# Patient Record
Sex: MALE | Race: WHITE | NOT HISPANIC OR LATINO | Employment: OTHER | URBAN - METROPOLITAN AREA
[De-identification: names, ages, dates, MRNs, and addresses within clinical notes are randomized per-mention and may not be internally consistent; named-entity substitution may affect disease eponyms.]

---

## 2019-01-17 ENCOUNTER — HOSPITAL ENCOUNTER (OUTPATIENT)
Dept: LAB | Facility: HOSPITAL | Age: 69
Discharge: HOME | End: 2019-01-17
Attending: INTERNAL MEDICINE

## 2019-01-17 LAB
ALBUMIN SERPL-MCNC: 4.2 G/DL (ref 3.5–5)
ALBUMIN/GLOB SERPL: 1.4 {RATIO} (ref 1.4–2.6)
ALP SERPL-CCNC: 64 U/L (ref 56–155)
ALT SERPL-CCNC: 24 U/L (ref 10–40)
ANION GAP SERPL CALC-SCNC: 20 MMOL/L (ref 8–19)
AST SERPL-CCNC: 20 U/L (ref 15–50)
BASOPHILS # BLD AUTO: 0.17 10*3/UL (ref 0–0.2)
BASOPHILS NFR BLD AUTO: 1.13 % (ref 0–3)
BILIRUB SERPL-MCNC: 0.36 MG/DL (ref 0.2–1.3)
BUN SERPL-MCNC: 18 MG/DL (ref 5–25)
BUN/CREAT SERPL: 16 {RATIO} (ref 6–20)
CALCIUM SERPL-MCNC: 10.1 MG/DL (ref 8.7–10.4)
CHLORIDE SERPL-SCNC: 101 MMOL/L (ref 99–111)
CHOLEST SERPL-MCNC: 124 MG/DL (ref 107–200)
CHOLEST/HDLC SERPL: 4 {RATIO} (ref 3–6)
CONV CO2: 25 MMOL/L (ref 22–32)
CONV TOTAL PROTEIN: 7.2 G/DL (ref 6.3–8.2)
CREAT UR-MCNC: 1.13 MG/DL (ref 0.7–1.2)
EOSINOPHIL # BLD AUTO: 0.47 10*3/UL (ref 0–0.7)
EOSINOPHIL # BLD AUTO: 3.08 % (ref 0–7)
ERYTHROCYTE [DISTWIDTH] IN BLOOD BY AUTOMATED COUNT: 12.3 % (ref 11.5–14.5)
EST. AVERAGE GLUCOSE BLD GHB EST-MCNC: 194 MG/DL
GFR SERPLBLD BASED ON 1.73 SQ M-ARVRAT: >60 ML/MIN/{1.73_M2}
GLOBULIN UR ELPH-MCNC: 3 G/DL (ref 2–3.5)
GLUCOSE SERPL-MCNC: 176 MG/DL (ref 70–99)
HBA1C MFR BLD: 15.3 G/DL (ref 14–18)
HBA1C MFR BLD: 8.4 % (ref 3.5–5.7)
HCT VFR BLD AUTO: 44.4 % (ref 42–52)
HDLC SERPL-MCNC: 31 MG/DL (ref 40–60)
LDLC SERPL CALC-MCNC: 14 MG/DL (ref 70–100)
LYMPHOCYTES # BLD AUTO: 3.48 10*3/UL (ref 1–5)
MCH RBC QN AUTO: 31.9 PG (ref 27–31)
MCHC RBC AUTO-ENTMCNC: 34.4 G/DL (ref 33–37)
MCV RBC AUTO: 92.8 FL (ref 80–96)
MONOCYTES # BLD AUTO: 1.34 10*3/UL (ref 0.2–1.2)
MONOCYTES NFR BLD AUTO: 8.87 % (ref 3–10)
NEUTROPHILS # BLD AUTO: 9.69 10*3/UL (ref 2–8)
NEUTROPHILS NFR BLD AUTO: 64 % (ref 30–85)
NRBC BLD AUTO-RTO: 0 % (ref 0–0.01)
OSMOLALITY SERPL CALC.SUM OF ELEC: 298 MOSM/KG (ref 273–304)
PLATELET # BLD AUTO: 242 10*3/UL (ref 130–400)
PMV BLD AUTO: 9.7 FL (ref 7.4–10.4)
POTASSIUM SERPL-SCNC: 5.3 MMOL/L (ref 3.5–5.3)
RBC # BLD AUTO: 4.79 10*6/UL (ref 4.7–6.1)
SODIUM SERPL-SCNC: 141 MMOL/L (ref 135–147)
TRIGL SERPL-MCNC: 393 MG/DL (ref 40–150)
VARIANT LYMPHS NFR BLD MANUAL: 23 % (ref 20–45)
VLDLC SERPL-MCNC: 79 MG/DL (ref 5–37)
WBC # BLD AUTO: 15.2 10*3/UL (ref 4.8–10.8)

## 2019-05-16 ENCOUNTER — HOSPITAL ENCOUNTER (OUTPATIENT)
Dept: LAB | Facility: HOSPITAL | Age: 69
Discharge: HOME | End: 2019-05-16
Attending: INTERNAL MEDICINE

## 2019-05-16 LAB
ALBUMIN SERPL-MCNC: 4.2 G/DL (ref 3.5–5)
ALBUMIN/GLOB SERPL: 1.6 {RATIO} (ref 1.4–2.6)
ALP SERPL-CCNC: 64 U/L (ref 56–155)
ALT SERPL-CCNC: 25 U/L (ref 10–40)
ANION GAP SERPL CALC-SCNC: 17 MMOL/L (ref 8–19)
AST SERPL-CCNC: 20 U/L (ref 15–50)
BASOPHILS # BLD AUTO: 0.02 10*3/UL (ref 0–0.2)
BASOPHILS NFR BLD AUTO: 0.2 % (ref 0–3)
BILIRUB SERPL-MCNC: 0.56 MG/DL (ref 0.2–1.3)
BUN SERPL-MCNC: 14 MG/DL (ref 5–25)
BUN/CREAT SERPL: 13 {RATIO} (ref 6–20)
CALCIUM SERPL-MCNC: 9.1 MG/DL (ref 8.7–10.4)
CHLORIDE SERPL-SCNC: 103 MMOL/L (ref 99–111)
CHOLEST SERPL-MCNC: 107 MG/DL (ref 107–200)
CHOLEST/HDLC SERPL: 3.6 {RATIO} (ref 3–6)
CONV ABS IMM GRAN: 0.03 10*3/UL (ref 0–0.2)
CONV CO2: 26 MMOL/L (ref 22–32)
CONV IMMATURE GRAN: 0.4 % (ref 0–1.8)
CONV TOTAL PROTEIN: 6.9 G/DL (ref 6.3–8.2)
CREAT UR-MCNC: 1.05 MG/DL (ref 0.7–1.2)
DEPRECATED RDW RBC AUTO: 45.2 FL (ref 35.1–43.9)
EOSINOPHIL # BLD AUTO: 0.28 10*3/UL (ref 0–0.7)
EOSINOPHIL # BLD AUTO: 3.5 % (ref 0–7)
ERYTHROCYTE [DISTWIDTH] IN BLOOD BY AUTOMATED COUNT: 13.1 % (ref 11.6–14.4)
EST. AVERAGE GLUCOSE BLD GHB EST-MCNC: 192 MG/DL
GFR SERPLBLD BASED ON 1.73 SQ M-ARVRAT: >60 ML/MIN/{1.73_M2}
GLOBULIN UR ELPH-MCNC: 2.7 G/DL (ref 2–3.5)
GLUCOSE SERPL-MCNC: 179 MG/DL (ref 70–99)
HBA1C MFR BLD: 15.1 G/DL (ref 14–18)
HBA1C MFR BLD: 8.3 % (ref 3.5–5.7)
HCT VFR BLD AUTO: 46.6 % (ref 42–52)
HDLC SERPL-MCNC: 30 MG/DL (ref 40–60)
LDLC SERPL CALC-MCNC: 43 MG/DL (ref 70–100)
LYMPHOCYTES # BLD AUTO: 2.63 10*3/UL (ref 1–5)
MCH RBC QN AUTO: 30.5 PG (ref 27–31)
MCHC RBC AUTO-ENTMCNC: 32.4 G/DL (ref 33–37)
MCV RBC AUTO: 94.1 FL (ref 80–96)
MONOCYTES # BLD AUTO: 0.68 10*3/UL (ref 0.2–1.2)
MONOCYTES NFR BLD AUTO: 8.5 % (ref 3–10)
NEUTROPHILS # BLD AUTO: 4.4 10*3/UL (ref 2–8)
NEUTROPHILS NFR BLD AUTO: 54.7 % (ref 30–85)
NRBC CBCN: 0 % (ref 0–0.7)
OSMOLALITY SERPL CALC.SUM OF ELEC: 297 MOSM/KG (ref 273–304)
PLATELET # BLD AUTO: 240 10*3/UL (ref 130–400)
PMV BLD AUTO: 12.1 FL (ref 9.4–12.4)
POTASSIUM SERPL-SCNC: 4.7 MMOL/L (ref 3.5–5.3)
RBC # BLD AUTO: 4.95 10*6/UL (ref 4.7–6.1)
SODIUM SERPL-SCNC: 141 MMOL/L (ref 135–147)
TRIGL SERPL-MCNC: 169 MG/DL (ref 40–150)
VARIANT LYMPHS NFR BLD MANUAL: 32.7 % (ref 20–45)
VLDLC SERPL-MCNC: 34 MG/DL (ref 5–37)
WBC # BLD AUTO: 8.04 10*3/UL (ref 4.8–10.8)

## 2019-08-22 ENCOUNTER — HOSPITAL ENCOUNTER (OUTPATIENT)
Dept: SURGERY | Facility: HOSPITAL | Age: 69
Setting detail: HOSPITAL OUTPATIENT SURGERY
Discharge: HOME | End: 2019-08-22
Attending: OPHTHALMOLOGY

## 2019-08-22 LAB — GLUCOSE BLD-MCNC: 150 MG/DL (ref 70–99)

## 2019-08-29 ENCOUNTER — HOSPITAL ENCOUNTER (OUTPATIENT)
Dept: SURGERY | Facility: HOSPITAL | Age: 69
Setting detail: HOSPITAL OUTPATIENT SURGERY
Discharge: HOME | End: 2019-08-29
Attending: OPHTHALMOLOGY

## 2019-08-29 LAB — GLUCOSE BLD-MCNC: 167 MG/DL (ref 70–99)

## 2019-09-16 ENCOUNTER — HOSPITAL ENCOUNTER (OUTPATIENT)
Dept: LAB | Facility: HOSPITAL | Age: 69
Discharge: HOME | End: 2019-09-16
Attending: INTERNAL MEDICINE

## 2019-09-16 LAB
ANION GAP SERPL CALC-SCNC: 22 MMOL/L (ref 8–19)
BUN SERPL-MCNC: 28 MG/DL (ref 5–25)
BUN/CREAT SERPL: 16 {RATIO} (ref 6–20)
CALCIUM SERPL-MCNC: 10 MG/DL (ref 8.7–10.4)
CHLORIDE SERPL-SCNC: 100 MMOL/L (ref 99–111)
CONV CO2: 27 MMOL/L (ref 22–32)
CREAT UR-MCNC: 1.72 MG/DL (ref 0.7–1.2)
EST. AVERAGE GLUCOSE BLD GHB EST-MCNC: 180 MG/DL
GFR SERPLBLD BASED ON 1.73 SQ M-ARVRAT: 40 ML/MIN/{1.73_M2}
GLUCOSE SERPL-MCNC: 173 MG/DL (ref 70–99)
HBA1C MFR BLD: 7.9 % (ref 3.5–5.7)
OSMOLALITY SERPL CALC.SUM OF ELEC: 308 MOSM/KG (ref 273–304)
POTASSIUM SERPL-SCNC: 4.9 MMOL/L (ref 3.5–5.3)
SODIUM SERPL-SCNC: 144 MMOL/L (ref 135–147)

## 2019-10-15 ENCOUNTER — HOSPITAL ENCOUNTER (OUTPATIENT)
Dept: LAB | Facility: HOSPITAL | Age: 69
Discharge: HOME | End: 2019-10-15
Attending: INTERNAL MEDICINE

## 2019-10-15 LAB
ANION GAP SERPL CALC-SCNC: 22 MMOL/L (ref 8–19)
APPEARANCE UR: CLEAR
BILIRUB UR QL: NEGATIVE
BUN SERPL-MCNC: 22 MG/DL (ref 5–25)
BUN/CREAT SERPL: 18 {RATIO} (ref 6–20)
CALCIUM SERPL-MCNC: 10 MG/DL (ref 8.7–10.4)
CHLORIDE SERPL-SCNC: 95 MMOL/L (ref 99–111)
COLOR UR: YELLOW
CONV CO2: 24 MMOL/L (ref 22–32)
CONV COLLECTION SOURCE (UA): ABNORMAL
CONV UROBILINOGEN IN URINE BY AUTOMATED TEST STRIP: 0.2 {EHRLICHU}/DL (ref 0.1–1)
CREAT UR-MCNC: 1.21 MG/DL (ref 0.7–1.2)
GFR SERPLBLD BASED ON 1.73 SQ M-ARVRAT: >60 ML/MIN/{1.73_M2}
GLUCOSE SERPL-MCNC: 187 MG/DL (ref 70–99)
GLUCOSE UR QL: >=1000 MG/DL
HGB UR QL STRIP: NEGATIVE
KETONES UR QL STRIP: ABNORMAL MG/DL
LEUKOCYTE ESTERASE UR QL STRIP: NEGATIVE
NITRITE UR QL STRIP: NEGATIVE
OSMOLALITY SERPL CALC.SUM OF ELEC: 290 MOSM/KG (ref 273–304)
PH UR STRIP.AUTO: 5 [PH] (ref 5–8)
POTASSIUM SERPL-SCNC: 4.7 MMOL/L (ref 3.5–5.3)
PROT UR QL: NEGATIVE MG/DL
SODIUM SERPL-SCNC: 136 MMOL/L (ref 135–147)
SP GR UR: 1.03 (ref 1–1.03)

## 2019-10-18 LAB
AMOXICILLIN+CLAV SUSC ISLT: <=2
AMPICILLIN SUSC ISLT: <=2
AMPICILLIN+SULBAC SUSC ISLT: <=2
BACTERIA UR CULT: ABNORMAL
CEFAZOLIN SUSC ISLT: <=4
CEFEPIME SUSC ISLT: <=1
CEFTAZIDIME SUSC ISLT: <=1
CEFTRIAXONE SUSC ISLT: <=1
CEFUROXIME ORAL SUSC ISLT: 4
CEFUROXIME PARENTER SUSC ISLT: 4
CIPROFLOXACIN SUSC ISLT: <=0.25
ERTAPENEM SUSC ISLT: <=0.5
GENTAMICIN SUSC ISLT: <=1
LEVOFLOXACIN SUSC ISLT: <=0.12
NITROFURANTOIN SUSC ISLT: <=16
TETRACYCLINE SUSC ISLT: <=1
TMP SMX SUSC ISLT: <=20
TOBRAMYCIN SUSC ISLT: <=1

## 2019-12-04 ENCOUNTER — HOSPITAL ENCOUNTER (OUTPATIENT)
Dept: LAB | Facility: HOSPITAL | Age: 69
Discharge: HOME | End: 2019-12-04
Attending: INTERNAL MEDICINE

## 2019-12-04 LAB
ANION GAP SERPL CALC-SCNC: 21 MMOL/L (ref 8–19)
BUN SERPL-MCNC: 14 MG/DL (ref 5–25)
BUN/CREAT SERPL: 14 {RATIO} (ref 6–20)
CALCIUM SERPL-MCNC: 9.7 MG/DL (ref 8.7–10.4)
CHLORIDE SERPL-SCNC: 97 MMOL/L (ref 99–111)
CHOLEST SERPL-MCNC: 130 MG/DL (ref 107–200)
CHOLEST/HDLC SERPL: 4.1 {RATIO} (ref 3–6)
CONV CO2: 24 MMOL/L (ref 22–32)
CREAT UR-MCNC: 0.99 MG/DL (ref 0.7–1.2)
EST. AVERAGE GLUCOSE BLD GHB EST-MCNC: 186 MG/DL
GFR SERPLBLD BASED ON 1.73 SQ M-ARVRAT: >60 ML/MIN/{1.73_M2}
GLUCOSE SERPL-MCNC: 201 MG/DL (ref 70–99)
HBA1C MFR BLD: 8.1 % (ref 3.5–5.7)
HDLC SERPL-MCNC: 32 MG/DL (ref 40–60)
LDLC SERPL CALC-MCNC: 37 MG/DL (ref 70–100)
OSMOLALITY SERPL CALC.SUM OF ELEC: 292 MOSM/KG (ref 273–304)
POTASSIUM SERPL-SCNC: 4.4 MMOL/L (ref 3.5–5.3)
SODIUM SERPL-SCNC: 138 MMOL/L (ref 135–147)
TRIGL SERPL-MCNC: 306 MG/DL (ref 40–150)
VLDLC SERPL-MCNC: 61 MG/DL (ref 5–37)

## 2020-05-07 ENCOUNTER — HOSPITAL ENCOUNTER (OUTPATIENT)
Dept: LAB | Facility: HOSPITAL | Age: 70
Discharge: HOME | End: 2020-05-07
Attending: INTERNAL MEDICINE

## 2020-05-07 LAB
ANION GAP SERPL CALC-SCNC: 25 MMOL/L (ref 8–19)
BUN SERPL-MCNC: 21 MG/DL (ref 5–25)
BUN/CREAT SERPL: 18 {RATIO} (ref 6–20)
CALCIUM SERPL-MCNC: 10.1 MG/DL (ref 8.7–10.4)
CHLORIDE SERPL-SCNC: 102 MMOL/L (ref 99–111)
CONV CO2: 20 MMOL/L (ref 22–32)
CONV CREATININE URINE, RANDOM: 70.4 MG/DL (ref 10–300)
CONV MICROALBUM.,U,RANDOM: <12 MG/L (ref 0–20)
CREAT UR-MCNC: 1.2 MG/DL (ref 0.7–1.2)
EST. AVERAGE GLUCOSE BLD GHB EST-MCNC: 192 MG/DL
GFR SERPLBLD BASED ON 1.73 SQ M-ARVRAT: >60 ML/MIN/{1.73_M2}
GLUCOSE SERPL-MCNC: 159 MG/DL (ref 70–99)
HBA1C MFR BLD: 8.3 % (ref 3.5–5.7)
MICROALBUMIN/CREAT UR: 17 MG/G{CRE} (ref 0–25)
OSMOLALITY SERPL CALC.SUM OF ELEC: 300 MOSM/KG (ref 273–304)
POTASSIUM SERPL-SCNC: 4.7 MMOL/L (ref 3.5–5.3)
SODIUM SERPL-SCNC: 142 MMOL/L (ref 135–147)

## 2020-08-24 ENCOUNTER — HOSPITAL ENCOUNTER (OUTPATIENT)
Dept: LAB | Facility: HOSPITAL | Age: 70
Discharge: HOME | End: 2020-08-24
Attending: INTERNAL MEDICINE

## 2020-08-24 LAB
ALBUMIN SERPL-MCNC: 4.3 G/DL (ref 3.5–5)
ALBUMIN/GLOB SERPL: 1.7 {RATIO} (ref 1.4–2.6)
ALP SERPL-CCNC: 64 U/L (ref 56–155)
ALT SERPL-CCNC: 23 U/L (ref 10–40)
ANION GAP SERPL CALC-SCNC: 18 MMOL/L (ref 8–19)
AST SERPL-CCNC: 18 U/L (ref 15–50)
BASOPHILS # BLD AUTO: 0.02 10*3/UL (ref 0–0.2)
BASOPHILS NFR BLD AUTO: 0.2 % (ref 0–3)
BILIRUB SERPL-MCNC: 0.36 MG/DL (ref 0.2–1.3)
BUN SERPL-MCNC: 21 MG/DL (ref 5–25)
BUN/CREAT SERPL: 17 {RATIO} (ref 6–20)
CALCIUM SERPL-MCNC: 10.4 MG/DL (ref 8.7–10.4)
CHLORIDE SERPL-SCNC: 100 MMOL/L (ref 99–111)
CHOLEST SERPL-MCNC: 125 MG/DL (ref 107–200)
CHOLEST/HDLC SERPL: 4 {RATIO} (ref 3–6)
CONV ABS IMM GRAN: 0.03 10*3/UL (ref 0–0.2)
CONV CO2: 25 MMOL/L (ref 22–32)
CONV IMMATURE GRAN: 0.3 % (ref 0–1.8)
CONV TOTAL PROTEIN: 6.8 G/DL (ref 6.3–8.2)
CREAT UR-MCNC: 1.25 MG/DL (ref 0.7–1.2)
DEPRECATED RDW RBC AUTO: 49 FL (ref 35.1–43.9)
EOSINOPHIL # BLD AUTO: 0.64 10*3/UL (ref 0–0.7)
EOSINOPHIL # BLD AUTO: 6.5 % (ref 0–7)
ERYTHROCYTE [DISTWIDTH] IN BLOOD BY AUTOMATED COUNT: 13.8 % (ref 11.6–14.4)
EST. AVERAGE GLUCOSE BLD GHB EST-MCNC: 166 MG/DL
GFR SERPLBLD BASED ON 1.73 SQ M-ARVRAT: 59 ML/MIN/{1.73_M2}
GLOBULIN UR ELPH-MCNC: 2.5 G/DL (ref 2–3.5)
GLUCOSE SERPL-MCNC: 176 MG/DL (ref 70–99)
HBA1C MFR BLD: 7.4 % (ref 3.5–5.7)
HCT VFR BLD AUTO: 47.8 % (ref 42–52)
HDLC SERPL-MCNC: 31 MG/DL (ref 40–60)
HGB BLD-MCNC: 15.5 G/DL (ref 14–18)
LDLC SERPL CALC-MCNC: 27 MG/DL (ref 70–100)
LYMPHOCYTES # BLD AUTO: 3.05 10*3/UL (ref 1–5)
LYMPHOCYTES NFR BLD AUTO: 31.2 % (ref 20–45)
MCH RBC QN AUTO: 31.3 PG (ref 27–31)
MCHC RBC AUTO-ENTMCNC: 32.4 G/DL (ref 33–37)
MCV RBC AUTO: 96.4 FL (ref 80–96)
MONOCYTES # BLD AUTO: 1.1 10*3/UL (ref 0.2–1.2)
MONOCYTES NFR BLD AUTO: 11.2 % (ref 3–10)
NEUTROPHILS # BLD AUTO: 4.95 10*3/UL (ref 2–8)
NEUTROPHILS NFR BLD AUTO: 50.6 % (ref 30–85)
NRBC CBCN: 0 % (ref 0–0.7)
OSMOLALITY SERPL CALC.SUM OF ELEC: 293 MOSM/KG (ref 273–304)
PLATELET # BLD AUTO: 222 10*3/UL (ref 130–400)
PMV BLD AUTO: 12.5 FL (ref 9.4–12.4)
POTASSIUM SERPL-SCNC: 5.3 MMOL/L (ref 3.5–5.3)
RBC # BLD AUTO: 4.96 10*6/UL (ref 4.7–6.1)
SODIUM SERPL-SCNC: 138 MMOL/L (ref 135–147)
T4 FREE SERPL-MCNC: 1.1 NG/DL (ref 0.9–1.8)
TRIGL SERPL-MCNC: 336 MG/DL (ref 40–150)
TSH SERPL-ACNC: 3.94 M[IU]/L (ref 0.27–4.2)
VLDLC SERPL-MCNC: 67 MG/DL (ref 5–37)
WBC # BLD AUTO: 9.79 10*3/UL (ref 4.8–10.8)

## 2021-01-14 ENCOUNTER — HOSPITAL ENCOUNTER (OUTPATIENT)
Dept: LAB | Facility: HOSPITAL | Age: 71
Discharge: HOME | End: 2021-01-14
Attending: INTERNAL MEDICINE

## 2021-01-14 LAB
ALBUMIN SERPL-MCNC: 4.6 G/DL (ref 3.5–5)
ALBUMIN/GLOB SERPL: 1.4 {RATIO} (ref 1.4–2.6)
ALP SERPL-CCNC: 71 U/L (ref 56–155)
ALT SERPL-CCNC: 35 U/L (ref 10–40)
ANION GAP SERPL CALC-SCNC: 20 MMOL/L (ref 8–19)
AST SERPL-CCNC: 38 U/L (ref 15–50)
BILIRUB SERPL-MCNC: 0.59 MG/DL (ref 0.2–1.3)
BUN SERPL-MCNC: 18 MG/DL (ref 5–25)
BUN/CREAT SERPL: 14 {RATIO} (ref 6–20)
CALCIUM SERPL-MCNC: 9.9 MG/DL (ref 8.7–10.4)
CHLORIDE SERPL-SCNC: 97 MMOL/L (ref 99–111)
CHOLEST SERPL-MCNC: 156 MG/DL (ref 107–200)
CHOLEST/HDLC SERPL: 4.1 {RATIO} (ref 3–6)
CONV CO2: 25 MMOL/L (ref 22–32)
CONV TOTAL PROTEIN: 7.8 G/DL (ref 6.3–8.2)
CREAT UR-MCNC: 1.25 MG/DL (ref 0.7–1.2)
EST. AVERAGE GLUCOSE BLD GHB EST-MCNC: 163 MG/DL
GFR SERPLBLD BASED ON 1.73 SQ M-ARVRAT: 59 ML/MIN/{1.73_M2}
GLOBULIN UR ELPH-MCNC: 3.2 G/DL (ref 2–3.5)
GLUCOSE SERPL-MCNC: 161 MG/DL (ref 70–99)
HBA1C MFR BLD: 7.3 % (ref 3.5–5.7)
HDLC SERPL-MCNC: 38 MG/DL (ref 40–60)
LDLC SERPL CALC-MCNC: 60 MG/DL (ref 70–100)
OSMOLALITY SERPL CALC.SUM OF ELEC: 289 MOSM/KG (ref 273–304)
POTASSIUM SERPL-SCNC: 4.6 MMOL/L (ref 3.5–5.3)
SODIUM SERPL-SCNC: 137 MMOL/L (ref 135–147)
TRIGL SERPL-MCNC: 289 MG/DL (ref 40–150)
VLDLC SERPL-MCNC: 58 MG/DL (ref 5–37)

## 2021-03-10 ENCOUNTER — HOSPITAL ENCOUNTER (OUTPATIENT)
Dept: VACCINE CLINIC | Facility: HOSPITAL | Age: 71
Discharge: HOME | End: 2021-03-10
Attending: INTERNAL MEDICINE

## 2021-03-31 ENCOUNTER — HOSPITAL ENCOUNTER (OUTPATIENT)
Dept: VACCINE CLINIC | Facility: HOSPITAL | Age: 71
Discharge: HOME | End: 2021-03-31
Attending: INTERNAL MEDICINE

## 2021-05-28 ENCOUNTER — HOSPITAL ENCOUNTER (OUTPATIENT)
Dept: LAB | Facility: HOSPITAL | Age: 71
Discharge: HOME | End: 2021-05-28
Attending: INTERNAL MEDICINE

## 2021-05-28 LAB
ANION GAP SERPL CALC-SCNC: 19 MMOL/L (ref 8–19)
BUN SERPL-MCNC: 28 MG/DL (ref 5–25)
BUN/CREAT SERPL: 21 {RATIO} (ref 6–20)
CALCIUM SERPL-MCNC: 9.3 MG/DL (ref 8.7–10.4)
CHLORIDE SERPL-SCNC: 98 MMOL/L (ref 99–111)
CONV CO2: 24 MMOL/L (ref 22–32)
CONV CREATININE URINE, RANDOM: 113.7 MG/DL (ref 10–300)
CONV MICROALBUM.,U,RANDOM: <12 MG/L (ref 0–20)
CREAT UR-MCNC: 1.32 MG/DL (ref 0.7–1.2)
EST. AVERAGE GLUCOSE BLD GHB EST-MCNC: 157 MG/DL
GFR SERPLBLD BASED ON 1.73 SQ M-ARVRAT: 55 ML/MIN/{1.73_M2}
GLUCOSE SERPL-MCNC: 133 MG/DL (ref 70–99)
HBA1C MFR BLD: 7.1 % (ref 3.5–5.7)
MICROALBUMIN/CREAT UR: 10.6 MG/G{CRE} (ref 0–25)
OSMOLALITY SERPL CALC.SUM OF ELEC: 289 MOSM/KG (ref 273–304)
POTASSIUM SERPL-SCNC: 5 MMOL/L (ref 3.5–5.3)
SODIUM SERPL-SCNC: 136 MMOL/L (ref 135–147)

## 2021-07-09 RX ORDER — LANCETS 30 GAUGE
1 EACH MISCELLANEOUS 2 TIMES DAILY
Qty: 180 EACH | Refills: 0 | Status: SHIPPED | OUTPATIENT
Start: 2021-07-09 | End: 2022-07-25 | Stop reason: SDUPTHER

## 2021-07-09 RX ORDER — PEN NEEDLE, DIABETIC 30 GX5/16"
1 NEEDLE, DISPOSABLE MISCELLANEOUS 2 TIMES DAILY
Qty: 180 EACH | Refills: 0 | Status: SHIPPED | OUTPATIENT
Start: 2021-07-09 | End: 2022-07-25 | Stop reason: SDUPTHER

## 2021-07-15 ENCOUNTER — TELEPHONE (OUTPATIENT)
Dept: INTERNAL MEDICINE | Facility: CLINIC | Age: 71
End: 2021-07-15

## 2021-07-15 NOTE — TELEPHONE ENCOUNTER
[facility] called and stated the  for Bydureon doesn't make it anymore. They do have a Bydureon BCise. Can the switch it? PharmacistLiya stated the directions would be the same. [phone], Option #3

## 2021-07-16 ENCOUNTER — PREP FOR SURGERY (OUTPATIENT)
Dept: OTHER | Facility: HOSPITAL | Age: 71
End: 2021-07-16

## 2021-07-16 ENCOUNTER — OFFICE VISIT (OUTPATIENT)
Dept: SURGERY | Facility: CLINIC | Age: 71
End: 2021-07-16

## 2021-07-16 VITALS — BODY MASS INDEX: 42.04 KG/M2 | HEIGHT: 66 IN | WEIGHT: 261.6 LBS

## 2021-07-16 DIAGNOSIS — K59.00 CONSTIPATION, UNSPECIFIED CONSTIPATION TYPE: Primary | ICD-10-CM

## 2021-07-16 DIAGNOSIS — Z86.010 HISTORY OF COLONIC POLYPS: ICD-10-CM

## 2021-07-16 DIAGNOSIS — K59.00 CONSTIPATION: Primary | ICD-10-CM

## 2021-07-16 PROBLEM — Z86.0100 HISTORY OF COLONIC POLYPS: Status: ACTIVE | Noted: 2021-07-16

## 2021-07-16 PROCEDURE — 99203 OFFICE O/P NEW LOW 30 MIN: CPT | Performed by: NURSE PRACTITIONER

## 2021-07-16 RX ORDER — EXENATIDE 2 MG/.65ML
INJECTION, SUSPENSION, EXTENDED RELEASE SUBCUTANEOUS
COMMUNITY
End: 2022-03-11 | Stop reason: SDUPTHER

## 2021-07-16 RX ORDER — PANTOPRAZOLE SODIUM 40 MG/10ML
INJECTION, POWDER, LYOPHILIZED, FOR SOLUTION INTRAVENOUS EVERY 24 HOURS
COMMUNITY
Start: 2021-06-02 | End: 2021-08-12 | Stop reason: ALTCHOICE

## 2021-07-16 RX ORDER — LORATADINE 10 MG/1
10 CAPSULE, LIQUID FILLED ORAL
COMMUNITY
End: 2021-10-18 | Stop reason: SDUPTHER

## 2021-07-16 RX ORDER — SODIUM CHLORIDE 0.9 % (FLUSH) 0.9 %
3 SYRINGE (ML) INJECTION EVERY 12 HOURS SCHEDULED
Status: CANCELLED | OUTPATIENT
Start: 2021-07-16

## 2021-07-16 RX ORDER — MULTIPLE VITAMINS W/ MINERALS TAB 9MG-400MCG
1 TAB ORAL DAILY
COMMUNITY
End: 2022-07-25 | Stop reason: SDUPTHER

## 2021-07-16 RX ORDER — SODIUM CHLORIDE 0.9 % (FLUSH) 0.9 %
10 SYRINGE (ML) INJECTION AS NEEDED
Status: CANCELLED | OUTPATIENT
Start: 2021-07-16

## 2021-07-16 RX ORDER — ASPIRIN 81 MG/1
81 TABLET ORAL DAILY
COMMUNITY
End: 2022-02-28 | Stop reason: SDUPTHER

## 2021-07-16 RX ORDER — DOCUSATE SODIUM 100 MG/1
100 CAPSULE, LIQUID FILLED ORAL 3 TIMES DAILY
COMMUNITY
End: 2022-02-28 | Stop reason: SDUPTHER

## 2021-07-16 RX ORDER — ATENOLOL 50 MG/1
50 TABLET ORAL DAILY
COMMUNITY
End: 2022-01-20 | Stop reason: SDUPTHER

## 2021-07-16 RX ORDER — ATORVASTATIN CALCIUM 40 MG/1
40 TABLET, FILM COATED ORAL DAILY
COMMUNITY
End: 2022-02-28 | Stop reason: SDUPTHER

## 2021-07-16 RX ORDER — POLYETHYLENE GLYCOL 3350 17 G/17G
POWDER, FOR SOLUTION ORAL EVERY 24 HOURS
Status: ON HOLD | COMMUNITY
Start: 2021-06-02 | End: 2022-01-12

## 2021-07-16 RX ORDER — INSULIN GLARGINE 100 [IU]/ML
10 INJECTION, SOLUTION SUBCUTANEOUS
COMMUNITY
End: 2022-04-25 | Stop reason: SDUPTHER

## 2021-07-16 RX ORDER — GABAPENTIN 600 MG/1
600 TABLET ORAL 3 TIMES DAILY
COMMUNITY
End: 2022-02-28 | Stop reason: SDUPTHER

## 2021-07-16 RX ORDER — LISINOPRIL AND HYDROCHLOROTHIAZIDE 20; 12.5 MG/1; MG/1
1 TABLET ORAL DAILY
COMMUNITY
End: 2022-01-20 | Stop reason: SDUPTHER

## 2021-07-16 NOTE — PROGRESS NOTES
"Chief Complaint  Colonoscopy    Subjective          Norberto Whiteside presents to [facility] GENERAL SURGERY  Patient is a 71-year-old white/ male that presents to general surgery office for colonoscopy consultation.    Patient admits to some constipation issues.  Reports that he only has a bowel movement every 3 days.    Admits to recently starting stool softeners 3 times a day and MiraLAX daily.  Reports it has improved his symptoms some.    Denies any change in bowel habit or rectal bleeding.    Admits to history of colorectal cancer with his paternal uncle.    Reports last colonoscopy was 10 years ago at [place] and had a few small polyps removed.    Patient has no known drug allergies.    Objective   Vital Signs:   Ht 167.6 cm (66\")   Wt 119 kg (261 lb 9.6 oz)   BMI 42.22 kg/m²     Physical Exam  Constitutional:       Appearance: Normal appearance.   Cardiovascular:      Rate and Rhythm: Normal rate.   Pulmonary:      Effort: Pulmonary effort is normal.   Abdominal:      General: Abdomen is flat.      Palpations: Abdomen is soft.   Skin:     General: Skin is warm and dry.   Neurological:      General: No focal deficit present.      Mental Status: He is alert and oriented to person, place, and time.   Psychiatric:         Mood and Affect: Mood normal.         Behavior: Behavior normal.        Result Review :                 Assessment and Plan    Diagnoses and all orders for this visit:    1. Constipation, unspecified constipation type (Primary)    2. History of colonic polyps        Follow Up   Return for Scheduled colonoscopy with Dr. Meade on 8/18/21 at [facility].     Hospital call with arrival time.      Patient was given instructions and counseling regarding his condition or for health maintenance advice. Please see specific information pulled into the AVS if appropriate.       "

## 2021-08-12 ENCOUNTER — OFFICE VISIT (OUTPATIENT)
Dept: PODIATRY | Facility: CLINIC | Age: 71
End: 2021-08-12

## 2021-08-12 VITALS
WEIGHT: 208 LBS | OXYGEN SATURATION: 98 % | SYSTOLIC BLOOD PRESSURE: 109 MMHG | BODY MASS INDEX: 33.43 KG/M2 | HEIGHT: 66 IN | DIASTOLIC BLOOD PRESSURE: 59 MMHG | HEART RATE: 82 BPM | TEMPERATURE: 97.1 F

## 2021-08-12 DIAGNOSIS — E11.8 DIABETIC FOOT (HCC): Primary | ICD-10-CM

## 2021-08-12 DIAGNOSIS — B35.1 ONYCHOMYCOSIS: ICD-10-CM

## 2021-08-12 DIAGNOSIS — M79.671 FOOT PAIN, BILATERAL: ICD-10-CM

## 2021-08-12 DIAGNOSIS — M79.672 FOOT PAIN, BILATERAL: ICD-10-CM

## 2021-08-12 DIAGNOSIS — E11.42 TYPE 2 DIABETES MELLITUS WITH DIABETIC POLYNEUROPATHY, WITH LONG-TERM CURRENT USE OF INSULIN (HCC): ICD-10-CM

## 2021-08-12 DIAGNOSIS — L60.0 ONYCHOCRYPTOSIS: ICD-10-CM

## 2021-08-12 DIAGNOSIS — Z79.4 TYPE 2 DIABETES MELLITUS WITH DIABETIC POLYNEUROPATHY, WITH LONG-TERM CURRENT USE OF INSULIN (HCC): ICD-10-CM

## 2021-08-12 DIAGNOSIS — G62.9 NEUROPATHY: ICD-10-CM

## 2021-08-12 PROCEDURE — 99203 OFFICE O/P NEW LOW 30 MIN: CPT | Performed by: PODIATRIST

## 2021-08-12 PROCEDURE — 11721 DEBRIDE NAIL 6 OR MORE: CPT | Performed by: PODIATRIST

## 2021-08-12 RX ORDER — PANTOPRAZOLE SODIUM 40 MG/1
40 TABLET, DELAYED RELEASE ORAL
COMMUNITY
Start: 2021-06-08 | End: 2022-03-11 | Stop reason: SDUPTHER

## 2021-08-12 NOTE — PROGRESS NOTES
[facility]    Today's Date: 21    Patient Name: Norberto Whiteside  MRN: [id number]  CSN: [id number]  PCP: Moreno Pereira MD  Referring Provider: No ref. provider found    SUBJECTIVE     Chief Complaint   Patient presents with   • Left Foot - Diabetes, Annual Exam   • Right Foot - Diabetes, Annual Exam     HPI: Norberto Whiteside, a 71 y.o.male, comes to clinic.    New, Established, New Problem:  New    Location:  Toenails    Duration:   Greater than five years    Onset:  Gradual    Nature:  sore with palpation.    Stable, worsening, improving:   Worsening    Aggravating factors:  Pain with shoe gear and ambulation.    Previous Treatment:  Unable to trim his own toenails  __________________________________    New, Established, New Problem:  New problem    Location:  bilateral feet    Duration:    Onset:  gradual    Nature:   IDDM     Stable, worsening, improving:  stable    Patient reported last blood glucose: 143  __________________________________    No other pedal complaints at this time.    Patient denies any fevers, chills, nausea, vomiting, shortness of breathe, nor any other constitutional signs nor symptoms.       Last PCP Visit:  Moreno Pereira MD, 15 July 2021    Past Medical History:   Diagnosis Date   • Allergies    • Callus    • Cancer (CMS/HCC)     skin cancer   • Diabetes mellitus (CMS/HCC)    • Hard of hearing    • Hypertension    • Sciatica      Past Surgical History:   Procedure Laterality Date   • ANGIOPLASTY       Family History   Problem Relation Age of Onset   • Stroke Mother    • Cancer Mother    • Cancer Father    • Cancer Sister      Social History     Socioeconomic History   • Marital status:      Spouse name: Not on file   • Number of children: Not on file   • Years of education: Not on file   • Highest education level: Not on file   Tobacco Use   • Smoking status: Former Smoker     Types: Cigarettes     Quit date: 3/1/1996     Years since quittin.4  "  • Smokeless tobacco: Never Used   Vaping Use   • Vaping Use: Never used   Substance and Sexual Activity   • Alcohol use: Yes     Comment: rare   • Drug use: Never   • Sexual activity: Defer     No Known Allergies  Current Outpatient Medications   Medication Sig Dispense Refill   • aspirin 81 MG EC tablet Take 81 mg by mouth Daily.     • atenolol (TENORMIN) 50 MG tablet Take 50 mg by mouth Daily.     • atorvastatin (LIPITOR) 40 MG tablet Take 40 mg by mouth Daily.     • docusate sodium (COLACE) 100 MG capsule Take 100 mg by mouth 3 (Three) Times a Day.     • empagliflozin (Jardiance) 25 MG tablet tablet Take 25 mg by mouth Daily.     • exenatide er (Bydureon) 2 MG pen-injector injection 1 injection     • gabapentin (NEURONTIN) 600 MG tablet Take 600 mg by mouth 3 (Three) Times a Day.     • insulin glargine (LANTUS, SEMGLEE) 100 UNIT/ML injection Inject 10 Units under the skin into the appropriate area as directed Daily.     • Insulin Pen Needle (Pen Needles 3/16\") 31G X 5 MM misc 1 kit 2 (two) times a day. 180 each 0   • Lancets misc 1 kit 2 (two) times a day. 180 each 0   • lisinopril-hydrochlorothiazide (PRINZIDE,ZESTORETIC) 20-12.5 MG per tablet qd     • Loratadine 10 MG capsule Take 10 mg by mouth.     • metFORMIN (GLUCOPHAGE) 1000 MG tablet Take 1,000 mg by mouth 2 (Two) Times a Day With Meals.     • multivitamin with minerals (MULTIVITAMIN ADULTS PO) Take 1 tablet by mouth Daily.     • pantoprazole (PROTONIX) 40 MG EC tablet Daily.     • polyethylene glycol (MiraLax) 17 GM/SCOOP powder Daily.       No current facility-administered medications for this visit.     Review of Systems   Constitutional: Negative.    Skin:        Painful toenails   Neurological: Positive for numbness.   All other systems reviewed and are negative.      OBJECTIVE     Vitals:    08/12/21 1327   BP: 109/59   Pulse: 82   Temp: 97.1 °F (36.2 °C)   SpO2: 98%       Lab Results   Component Value Date    HGBA1C 7.1 (H) 05/28/2021       Lab " Results   Component Value Date    CALCIUM 9.3 05/28/2021     05/28/2021    K 5.0 05/28/2021    CO2 24 05/28/2021    CL 98 (L) 05/28/2021    BUN 28 (H) 05/28/2021    CREATININE 1.32 (H) 05/28/2021    BCR 21 (H) 05/28/2021    ANIONGAP 19 05/28/2021       Patient seen in no apparent distress.      PHYSICAL EXAM:     Foot/Ankle Exam:       General:   Diabetic Foot Exam Performed    Appearance: appears stated age and healthy    Orientation: AAOx3    Affect: appropriate    Gait: unimpaired    Shoe Gear:  Casual shoes    VASCULAR      Right Foot Vascularity   Normal vascular exam    Dorsalis pedis:  1+  Posterior tibial:  1+  Skin Temperature: cool    Edema Grading:  None  CFT:  < 3 seconds  Pedal Hair Growth:  Present  Varicosities: none       Left Foot Vascularity   Normal vascular exam    Dorsalis pedis:  1+  Posterior tibial:  1+  Skin Temperature: cool    Edema Grading:  None  CFT:  < 3 seconds  Pedal Hair Growth:  Present  Varicosities: none        NEUROLOGIC     Right Foot Neurologic   Light touch sensation:  Diminished  Vibratory sensation:  Diminished  Hot/Cold sensation: diminished    Protective Sensation using [place]-Keiko Monofilament:  2     Left Foot Neurologic   Light touch sensation:  Diminished  Vibratory sensation:  Diminished  Hot/cold sensation: diminished    Protective Sensation using Wood[place]-Keiko Monofilament:  2     MUSCLE STRENGTH     Right Foot Muscle Strength   Normal strength    Foot dorsiflexion:  5  Foot plantar flexion:  5  Foot inversion:  5  Foot eversion:  5     Left Foot Muscle Strength   Normal strength    Foot dorsiflexion:  5  Foot plantar flexion:  5  Foot inversion:  5  Foot eversion:  5     RANGE OF MOTION      Right Foot Range of Motion   Foot and ankle ROM within normal limits       Left Foot Range of Motion   Foot and ankle ROM within normal limits       DERMATOLOGIC     Right Foot Dermatologic   Skin: skin intact    Nails: onychomycosis, abnormally thick, subungual  debris and dystrophic nails    Nails comment:  Toenails 1, 2, 3, 4, and 5     Left Foot Dermatologic   Skin: skin intact    Nails: onychomycosis, abnormally thick, subungual debris, dystrophic nails and ingrown toenail    Nails comment:  Toenails 1, 2, 3, 4, and 5      Diabetic Foot Exam Performed    ASSESSMENT/PLAN     Diagnoses and all orders for this visit:    1. Diabetic foot (CMS/[facility]) (Primary)    2. Type 2 diabetes mellitus with diabetic polyneuropathy, with long-term current use of insulin (CMS/[facility])    3. Foot pain, bilateral    4. Onychomycosis    5. Onychocryptosis    6. Neuropathy        Comprehensive lower extremity examination and evaluation was performed.    Discussed findings and treatment plan including risks, benefits, and treatment options with patient in detail. Patient agreed with treatment plan.    Toenails 1 through 5 bilaterally were debrided in thickness and length and then smoothed with a Dremel Tool.  Tolerated the procedure well without complications.    Diabetic foot exam performed and documented this date, compliant with CQM required standards. Detail of findings as noted in physical exam.  Lower extremity Neurologic exam for diabetic patient performed and documented this date, compliant with PQRS required standards. Detail of findings as noted in physical exam.  Advised patient importance of good routine lower extremity hygiene. Advised patient importance of evaluating for intact skin and pain free nail borders.  Advised patient to use mirror to evaluate plantar/ soles of feet for better visualization. Advised patient monitor and phone office to be seen if any cracking to skin, open lesions, painful nail borders or if nails become elongated prior to next visit. Advised patient importance of daily cleansing of lower extremities, followed by good skin cream to maintain normal hydration of skin. Also advised patient importance of close daily monitoring of blood sugar. Advised to regulate  diet and medications to maintain control of blood sugar in optimal range. Contact primary care provider if difficulties maintaining blood sugar levels.  Advised Patient of presence of Diabetes Mellitus condition.  Advised Patient risk of progression and worsening or improvement, then return of condition.  Will monitor condition for any change in future. Treat with most appropriate treatment pending status of condition.  Counseled and advised patient extensively on nature and ramifications of diabetes. Standard instructions given to patient for good diabetic foot care and maintenance. Advised importance of careful monitoring to avoid break down and complications secondary to diabetes. Advised patient importance of strict maintenance of blood sugar control. Advised patient of possible ominous results from neglect of condition, i.e.: amputation/ loss of digits, feet and legs, or even death.  Patient states understands counseling, will monitor closely, continue good hygiene and routine diabetic foot care. Patient will contact office is questions or problems.      An After Visit Summary was printed and given to the patient at discharge, including (if requested) any available informative/educational handouts regarding diagnosis, treatment, or medications. All questions were answered to patient/family satisfaction. Should symptoms fail to improve or worsen they agree to call or return to clinic or to go to the Emergency Department. Discussed the importance of following up with any needed screening tests/labs/specialist appointments and any requested follow-up recommended by me today. Importance of maintaining follow-up discussed and patient accepts that missed appointments can delay diagnosis and potentially lead to worsening of conditions.    Return in about 9 weeks (around 10/14/2021) for Toenail Care., or sooner if acute issues arise.    This document has been electronically signed by Wayne Foster DPM on August  12, 2021 13:54 EDT

## 2021-08-16 ENCOUNTER — TELEPHONE (OUTPATIENT)
Dept: SURGERY | Facility: CLINIC | Age: 71
End: 2021-08-16

## 2021-09-16 RX ORDER — TRAMADOL HYDROCHLORIDE 50 MG/1
TABLET ORAL
Qty: 120 TABLET | Refills: 2 | Status: SHIPPED | OUTPATIENT
Start: 2021-09-16 | End: 2022-07-25 | Stop reason: SDUPTHER

## 2021-10-03 PROBLEM — M15.0 PRIMARY OSTEOARTHRITIS INVOLVING MULTIPLE JOINTS: Status: ACTIVE | Noted: 2021-10-03

## 2021-10-03 PROBLEM — M19.90 IDIOPATHIC OSTEOARTHRITIS: Status: ACTIVE | Noted: 2021-10-03

## 2021-10-03 PROBLEM — E66.812 CLASS 2 SEVERE OBESITY DUE TO EXCESS CALORIES WITH SERIOUS COMORBIDITY AND BODY MASS INDEX (BMI) OF 38.0 TO 38.9 IN ADULT: Status: ACTIVE | Noted: 2021-10-03

## 2021-10-03 PROBLEM — E11.65 HYPERGLYCEMIA DUE TO TYPE 2 DIABETES MELLITUS: Status: ACTIVE | Noted: 2021-10-03

## 2021-10-03 PROBLEM — G93.32 CHRONIC FATIGUE SYNDROME: Status: ACTIVE | Noted: 2021-10-03

## 2021-10-03 PROBLEM — M15.9 PRIMARY OSTEOARTHRITIS INVOLVING MULTIPLE JOINTS: Status: ACTIVE | Noted: 2021-10-03

## 2021-10-03 PROBLEM — Z79.4 TYPE 2 DIABETES MELLITUS WITH HYPERGLYCEMIA, WITH LONG-TERM CURRENT USE OF INSULIN: Status: ACTIVE | Noted: 2021-10-03

## 2021-10-03 PROBLEM — E66.01 CLASS 2 SEVERE OBESITY DUE TO EXCESS CALORIES WITH SERIOUS COMORBIDITY AND BODY MASS INDEX (BMI) OF 38.0 TO 38.9 IN ADULT (HCC): Status: ACTIVE | Noted: 2021-10-03

## 2021-10-03 PROBLEM — E78.2 MIXED HYPERLIPIDEMIA: Status: ACTIVE | Noted: 2021-10-03

## 2021-10-03 PROBLEM — I25.110 UNSTABLE ANGINA PECTORIS DUE TO CORONARY ARTERIOSCLEROSIS: Status: ACTIVE | Noted: 2021-10-03

## 2021-10-03 PROBLEM — E11.65 TYPE 2 DIABETES MELLITUS WITH HYPERGLYCEMIA, WITH LONG-TERM CURRENT USE OF INSULIN: Status: ACTIVE | Noted: 2021-10-03

## 2021-10-03 PROBLEM — I10 PRIMARY HYPERTENSION: Status: ACTIVE | Noted: 2021-10-03

## 2021-10-13 ENCOUNTER — LAB (OUTPATIENT)
Dept: LAB | Facility: HOSPITAL | Age: 71
End: 2021-10-13

## 2021-10-13 ENCOUNTER — TRANSCRIBE ORDERS (OUTPATIENT)
Dept: LAB | Facility: HOSPITAL | Age: 71
End: 2021-10-13

## 2021-10-13 DIAGNOSIS — E78.5 HYPERLIPIDEMIA, UNSPECIFIED HYPERLIPIDEMIA TYPE: ICD-10-CM

## 2021-10-13 DIAGNOSIS — E11.00 TYPE II DIABETES MELLITUS WITH HYPEROSMOLARITY, UNCONTROLLED (HCC): ICD-10-CM

## 2021-10-13 DIAGNOSIS — E11.65 TYPE II DIABETES MELLITUS WITH HYPEROSMOLARITY, UNCONTROLLED (HCC): ICD-10-CM

## 2021-10-13 DIAGNOSIS — E11.65 TYPE II DIABETES MELLITUS WITH HYPEROSMOLARITY, UNCONTROLLED (HCC): Primary | ICD-10-CM

## 2021-10-13 DIAGNOSIS — E11.00 TYPE II DIABETES MELLITUS WITH HYPEROSMOLARITY, UNCONTROLLED (HCC): Primary | ICD-10-CM

## 2021-10-13 LAB
ALBUMIN SERPL-MCNC: 4.7 G/DL (ref 3.5–5.2)
ALBUMIN/GLOB SERPL: 1.9 G/DL
ALP SERPL-CCNC: 62 U/L (ref 39–117)
ALT SERPL W P-5'-P-CCNC: 34 U/L (ref 1–41)
ANION GAP SERPL CALCULATED.3IONS-SCNC: 10.6 MMOL/L (ref 5–15)
AST SERPL-CCNC: 28 U/L (ref 1–40)
BILIRUB SERPL-MCNC: 0.5 MG/DL (ref 0–1.2)
BUN SERPL-MCNC: 20 MG/DL (ref 8–23)
BUN/CREAT SERPL: 20.2 (ref 7–25)
CALCIUM SPEC-SCNC: 9.5 MG/DL (ref 8.6–10.5)
CHLORIDE SERPL-SCNC: 100 MMOL/L (ref 98–107)
CHOLEST SERPL-MCNC: 165 MG/DL (ref 0–200)
CO2 SERPL-SCNC: 26.4 MMOL/L (ref 22–29)
CREAT SERPL-MCNC: 0.99 MG/DL (ref 0.76–1.27)
GFR SERPL CREATININE-BSD FRML MDRD: 75 ML/MIN/1.73
GLOBULIN UR ELPH-MCNC: 2.5 GM/DL
GLUCOSE SERPL-MCNC: 111 MG/DL (ref 65–99)
HBA1C MFR BLD: 7.12 % (ref 4.8–5.6)
HDLC SERPL-MCNC: 32 MG/DL (ref 40–60)
LDLC SERPL CALC-MCNC: 82 MG/DL (ref 0–100)
LDLC/HDLC SERPL: 2.21 {RATIO}
POTASSIUM SERPL-SCNC: 4.7 MMOL/L (ref 3.5–5.2)
PROT SERPL-MCNC: 7.2 G/DL (ref 6–8.5)
SODIUM SERPL-SCNC: 137 MMOL/L (ref 136–145)
TRIGL SERPL-MCNC: 312 MG/DL (ref 0–150)
VLDLC SERPL-MCNC: 51 MG/DL (ref 5–40)

## 2021-10-13 PROCEDURE — 80053 COMPREHEN METABOLIC PANEL: CPT

## 2021-10-13 PROCEDURE — 83036 HEMOGLOBIN GLYCOSYLATED A1C: CPT

## 2021-10-13 PROCEDURE — 80061 LIPID PANEL: CPT

## 2021-10-13 PROCEDURE — 36415 COLL VENOUS BLD VENIPUNCTURE: CPT

## 2021-10-16 NOTE — PROGRESS NOTES
"Chief Complaint  Follow-up    Subjective          Norberto Whiteside presents to [facility] INTERNAL MEDICINE     History of Present Illness    Patient 71-year-old male with underlying diabetes mellitus, hypertension, hyperlipidemia, resultant coronary artery disease with prior stent, who is coming in now for routine 4-month follow-up.  We will review his labs and make further recommendations at that time.    Review of Systems   Constitutional: Negative for appetite change, fatigue and fever.   HENT: Negative for congestion and ear pain.    Eyes: Negative for blurred vision.   Respiratory: Negative for cough, chest tightness, shortness of breath and wheezing.    Cardiovascular: Negative for chest pain, palpitations and leg swelling.   Gastrointestinal: Negative for abdominal pain.   Genitourinary: Negative for difficulty urinating, dysuria and hematuria.   Musculoskeletal: Negative for arthralgias and gait problem.   Skin: Negative for skin lesions.   Neurological: Negative for syncope, memory problem and confusion.   Psychiatric/Behavioral: Negative for self-injury and depressed mood.       Objective   Vital Signs:   /76 (BP Location: Left arm, Patient Position: Sitting, Cuff Size: Adult)   Pulse 82   Temp 97.2 °F (36.2 °C) (Tympanic)   Resp 18   Ht 167.6 cm (66\")   Wt 97.3 kg (214 lb 9.6 oz)   SpO2 97% Comment: room air  BMI 34.64 kg/m²           Physical Exam  Vitals and nursing note reviewed.   Constitutional:       General: He is not in acute distress.     Appearance: Normal appearance. He is not toxic-appearing.   HENT:      Head: Atraumatic.      Right Ear: External ear normal.      Left Ear: External ear normal.      Nose: Nose normal.      Mouth/Throat:      Mouth: Mucous membranes are moist.   Eyes:      General:         Right eye: No discharge.         Left eye: No discharge.      Extraocular Movements: Extraocular movements intact.      Pupils: Pupils are equal, round, and " reactive to light.   Cardiovascular:      Rate and Rhythm: Normal rate and regular rhythm.      Pulses: Normal pulses.      Heart sounds: Normal heart sounds. No murmur heard.  No gallop.    Pulmonary:      Effort: Pulmonary effort is normal. No respiratory distress.      Breath sounds: No wheezing, rhonchi or rales.   Abdominal:      General: There is no distension.      Palpations: Abdomen is soft. There is no mass.      Tenderness: There is no abdominal tenderness. There is no guarding.   Musculoskeletal:         General: No swelling or tenderness.      Cervical back: No tenderness.      Right lower leg: No edema.      Left lower leg: No edema.   Skin:     General: Skin is warm and dry.      Findings: No rash.   Neurological:      General: No focal deficit present.      Mental Status: He is alert and oriented to person, place, and time. Mental status is at baseline.      Motor: No weakness.      Gait: Gait normal.   Psychiatric:         Mood and Affect: Mood normal.         Thought Content: Thought content normal.          Result Review :   The following data was reviewed by: Moreno Pereira MD on 10/05/2021:                  Assessment and Plan    Diagnoses and all orders for this visit:    1. Mixed hyperlipidemia (Primary)  Overview:  LDL is up from 60 to 82 as of 10/21 office visit.  He previously was very low, LDL less than 30 when he was on 80 mg of Lipitor.  He was stable on 40 mg, will continue that for now, and he will address it with lifestyle changes for now.    Orders:  -     Lipid Panel; Future    2. Unstable angina pectoris due to coronary arteriosclerosis (HCC)  Overview:  CAD s/p PTCA '96 with need for SPECT soonish=been too long it sound like; no sxs at least---> needs eval as of 6/21 for dizzy/weak/feels off; this got delayed due to his wife's illness, but is pending as of his 10/21 office visit.  No new symptoms this regards at least.      3. Type 2 diabetes mellitus with hyperglycemia, with  long-term current use of insulin (HCC)  Overview:  A1c remains stable at 7.1 as of 10/21 office visit.  The patient has been maxed out on Jardiance, Bydureon, and Metformin for quite some time.  Despite this his A1c was stuck at about 8.5.  We added low-dose Lantus in 5/20, and he has had excellent control since then.  Continue all same meds as written.    Orders:  -     Hemoglobin A1c; Future    4. Primary osteoarthritis involving multiple joints  Overview:  Had increase symptoms and is back and shoulders from happened care for his wife the last several months.  He treated this with Tylenol and some older saw relaxers he had.      5. Primary hypertension  Overview:  Blood pressure with good control as of 10/21 office visit.  Patient is stable to continue treatment with moderate dose Prinzide as well as moderate dose atenolol.  Of note, he did have low blood pressure when he was in the office last time, and we mentioned perhaps he only needs a half a dose of the lisinopril/HCTZ.  He will monitor his blood pressures again at home due to some dizziness, and perhaps will need to do this.    Orders:  -     Comprehensive Metabolic Panel; Future    6. History of colonic polyps  Overview:  Colonoscopy is pending as of his 10/21 office visit.  This is to be per Dr. Meade, but it has been delayed due to the coronavirus.      7. Other fatigue  -     TSH; Future  -     T4, free; Future    Other orders  -     Fluzone High-Dose 65+yrs ([id number])       --  --  OLDER NOTES:  VISIT 6/21:  ANNUAL MEDICARE WELLNESS PHYSICAL 9/17 = reviewed all forms with pt in office; no new concerns raised.  DM = A1C as below and OPTHO=20/20 and saw them in spring '18.  --  DM 2 ('15) and was started on Tanzem 3 mo ago and low dose amaryl was stopped; needs repeat labs, but FBS still in 280 ballpark; last A1C=9.9; will increase Tanzem before add another agent...down 10.8 to 8.5 past 4 months, so no invokana yet...7.6 is great trend, so no changes  1/18...8.5 again so needs jardiance/etc now since this is despite wt down some at 5/18 OV; also, may lose tanzem he tells me...8.2 is w/o any new agents, wt is still trending down, so will wait until after new year to add another if still in the 8's...8.4 and he will find out which one is covered...8.3 and got on Jardiance, so need to max it out as of 5/19 OV...7.9 is ok for now at least...8.1 and he blames it on his diet around holiday time; maxed out on 3 meds; will use lantus if same on RTO...8.3 and I d/w he needs Lantus now=10U qd and titrate...7.4 is nice drop already 9/20---> 7.1 is better 6/21.  (Micro-alb neg 5/20)  --  CAD s/p PTCA '96 with need for SPECT soonish=been too long it sound like; no sxs at least---> needs eval as of 6/21 for dizzy/weak/feels off;   LIPIDS with LDL 14 and TG's 400, ? lab error; will repeat prior to changes...LDL 43 with TG's < 200 is fine...LDL 37/TG's 300 baseline 12/19...LDL 27, so will lower dose now to 40 mg---> 60 is better 1/21  --  HTN remains well controlled and ok to lower to 1/2 tab ACEI/HCT if stays low at home.  ?CKD3 = 52% and will get on RTO in '19... >60%... 40% out of the blue 9/19---> 55% holding 6/21.  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  NEUROPATHY is worse 5/20 and I d/w anodyne is an option; worse when active; will use PRN ultram.  OBESITY with BMI 37 ballpark (TSH neg 5/18).  --  --  [facility].  [facility]per [place] prior he says, but no longer with them, so I will get copy of last as of 1/21.  Pneumovax x2 ; Prevnar rec 9/17.  Covid vaccine x2 with Pfizer, last was 3/31/21, so I discussed with patient he is fine to get the booster anytime he is aware.  Flu shot for 2021 season given 10/21 office visit.  ( 10/21 after 32 years of marriage, Sun had severe dementia at the end; retired  and then  and  retired '14, 1 girl in town)    Follow Up   No follow-ups on file.  Patient was given instructions and counseling regarding his condition or for health maintenance advice. Please see specific information pulled into the AVS if appropriate.

## 2021-10-18 ENCOUNTER — OFFICE VISIT (OUTPATIENT)
Dept: INTERNAL MEDICINE | Facility: CLINIC | Age: 71
End: 2021-10-18

## 2021-10-18 VITALS
OXYGEN SATURATION: 97 % | HEIGHT: 66 IN | BODY MASS INDEX: 34.49 KG/M2 | DIASTOLIC BLOOD PRESSURE: 76 MMHG | WEIGHT: 214.6 LBS | SYSTOLIC BLOOD PRESSURE: 126 MMHG | HEART RATE: 82 BPM | RESPIRATION RATE: 18 BRPM | TEMPERATURE: 97.2 F

## 2021-10-18 DIAGNOSIS — Z79.4 TYPE 2 DIABETES MELLITUS WITH HYPERGLYCEMIA, WITH LONG-TERM CURRENT USE OF INSULIN (HCC): ICD-10-CM

## 2021-10-18 DIAGNOSIS — I25.110 UNSTABLE ANGINA PECTORIS DUE TO CORONARY ARTERIOSCLEROSIS (HCC): ICD-10-CM

## 2021-10-18 DIAGNOSIS — Z86.010 HISTORY OF COLONIC POLYPS: ICD-10-CM

## 2021-10-18 DIAGNOSIS — M15.9 PRIMARY OSTEOARTHRITIS INVOLVING MULTIPLE JOINTS: ICD-10-CM

## 2021-10-18 DIAGNOSIS — R53.83 OTHER FATIGUE: ICD-10-CM

## 2021-10-18 DIAGNOSIS — E11.65 TYPE 2 DIABETES MELLITUS WITH HYPERGLYCEMIA, WITH LONG-TERM CURRENT USE OF INSULIN (HCC): ICD-10-CM

## 2021-10-18 DIAGNOSIS — E78.2 MIXED HYPERLIPIDEMIA: Primary | ICD-10-CM

## 2021-10-18 DIAGNOSIS — I10 PRIMARY HYPERTENSION: ICD-10-CM

## 2021-10-18 PROCEDURE — 99214 OFFICE O/P EST MOD 30 MIN: CPT | Performed by: INTERNAL MEDICINE

## 2021-10-18 PROCEDURE — 90662 IIV NO PRSV INCREASED AG IM: CPT | Performed by: INTERNAL MEDICINE

## 2021-10-18 PROCEDURE — G0008 ADMIN INFLUENZA VIRUS VAC: HCPCS | Performed by: INTERNAL MEDICINE

## 2021-10-18 RX ORDER — LORATADINE 10 MG/1
10 TABLET ORAL DAILY
COMMUNITY
Start: 2021-08-05 | End: 2022-03-11 | Stop reason: SDUPTHER

## 2021-11-02 ENCOUNTER — OFFICE VISIT (OUTPATIENT)
Dept: PODIATRY | Facility: CLINIC | Age: 71
End: 2021-11-02

## 2021-11-02 VITALS
HEART RATE: 83 BPM | SYSTOLIC BLOOD PRESSURE: 146 MMHG | DIASTOLIC BLOOD PRESSURE: 66 MMHG | WEIGHT: 214 LBS | TEMPERATURE: 97.3 F | OXYGEN SATURATION: 97 % | BODY MASS INDEX: 34.39 KG/M2 | HEIGHT: 66 IN

## 2021-11-02 DIAGNOSIS — G62.9 NEUROPATHY: ICD-10-CM

## 2021-11-02 DIAGNOSIS — E11.8 DIABETIC FOOT (HCC): ICD-10-CM

## 2021-11-02 DIAGNOSIS — E11.42 TYPE 2 DIABETES MELLITUS WITH DIABETIC POLYNEUROPATHY, WITH LONG-TERM CURRENT USE OF INSULIN (HCC): ICD-10-CM

## 2021-11-02 DIAGNOSIS — M79.672 FOOT PAIN, BILATERAL: Primary | ICD-10-CM

## 2021-11-02 DIAGNOSIS — B35.1 ONYCHOMYCOSIS: ICD-10-CM

## 2021-11-02 DIAGNOSIS — M79.671 FOOT PAIN, BILATERAL: Primary | ICD-10-CM

## 2021-11-02 DIAGNOSIS — Z79.4 TYPE 2 DIABETES MELLITUS WITH DIABETIC POLYNEUROPATHY, WITH LONG-TERM CURRENT USE OF INSULIN (HCC): ICD-10-CM

## 2021-11-02 DIAGNOSIS — L60.0 ONYCHOCRYPTOSIS: ICD-10-CM

## 2021-11-02 PROCEDURE — 11721 DEBRIDE NAIL 6 OR MORE: CPT | Performed by: PODIATRIST

## 2021-11-02 PROCEDURE — G8404 LOW EXTEMITY NEUR EXAM DOCUM: HCPCS | Performed by: PODIATRIST

## 2021-11-02 NOTE — PROGRESS NOTES
[facility] - PODIATRY    Today's Date: 21    Patient Name: Norberto Whiteside  MRN: [id number]  CSN: [id number]  PCP: Moreno Pereira MD  Referring Provider: No ref. provider found    SUBJECTIVE     Chief Complaint   Patient presents with   • Left Foot - Nail Problem   • Right Foot - Nail Problem     HPI: Norberto Whiteside, a 71 y.o.male, comes to clinic.    New, Established, New Problem:  est    Location:  Toenails    Duration:   Greater than five years    Onset:  Gradual    Nature:  sore with palpation.    Stable, worsening, improving:   improving    Aggravating factors:  Pain with shoe gear and ambulation.    Previous Treatment:  debridement    Patient reported last blood glucose: 140    Pt reports his wife past away since his last visit.    Patient denies any fevers, chills, nausea, vomiting, shortness of breathe, nor any other constitutional signs nor symptoms.       Last PCP Visit:  Moreno Pereira MD, 18 Oct 2021    Past Medical History:   Diagnosis Date   • Allergies    • Callus    • Cancer (HCC)     skin cancer   • Diabetes mellitus (HCC)    • Hard of hearing    • Hypertension    • Sciatica      Past Surgical History:   Procedure Laterality Date   • ANGIOPLASTY       Family History   Problem Relation Age of Onset   • Stroke Mother    • Cancer Mother    • Cancer Father    • Cancer Sister      Social History     Socioeconomic History   • Marital status:    Tobacco Use   • Smoking status: Former Smoker     Types: Cigarettes     Quit date: 3/1/1996     Years since quittin.6   • Smokeless tobacco: Never Used   Vaping Use   • Vaping Use: Never used   Substance and Sexual Activity   • Alcohol use: Yes     Comment: rare   • Drug use: Never   • Sexual activity: Defer     No Known Allergies  Current Outpatient Medications   Medication Sig Dispense Refill   • aspirin 81 MG EC tablet Take 81 mg by mouth Daily.     • atenolol (TENORMIN) 50 MG tablet Take 50 mg by mouth Daily.     •  "atorvastatin (LIPITOR) 40 MG tablet Take 40 mg by mouth Daily.     • docusate sodium (COLACE) 100 MG capsule Take 100 mg by mouth 3 (Three) Times a Day.     • empagliflozin (Jardiance) 25 MG tablet tablet Take 25 mg by mouth Daily.     • exenatide er (Bydureon) 2 MG pen-injector injection 1 injection     • gabapentin (NEURONTIN) 600 MG tablet Take 600 mg by mouth 3 (Three) Times a Day.     • insulin glargine (LANTUS, SEMGLEE) 100 UNIT/ML injection Inject 10 Units under the skin into the appropriate area as directed Daily.     • Insulin Pen Needle (Pen Needles 3/16\") 31G X 5 MM misc 1 kit 2 (two) times a day. 180 each 0   • Lancets misc 1 kit 2 (two) times a day. 180 each 0   • lisinopril-hydrochlorothiazide (PRINZIDE,ZESTORETIC) 20-12.5 MG per tablet qd     • loratadine (CLARITIN) 10 MG tablet      • metFORMIN (GLUCOPHAGE) 1000 MG tablet Take 1,000 mg by mouth 2 (Two) Times a Day With Meals.     • multivitamin with minerals (MULTIVITAMIN ADULTS PO) Take 1 tablet by mouth Daily.     • pantoprazole (PROTONIX) 40 MG EC tablet Daily.     • polyethylene glycol (MiraLax) 17 GM/SCOOP powder Daily.     • traMADol (ULTRAM) 50 MG tablet 1-2 tablets bid prn 120 tablet 2     No current facility-administered medications for this visit.     Review of Systems   Constitutional: Negative.    Skin:        Painful toenails   Neurological: Positive for numbness.   All other systems reviewed and are negative.      OBJECTIVE     Vitals:    11/02/21 1336   BP: 146/66   Pulse: 83   Temp: 97.3 °F (36.3 °C)   SpO2: 97%       Lab Results   Component Value Date    HGBA1C 7.12 (H) 10/13/2021       Lab Results   Component Value Date    GLUCOSE 111 (H) 10/13/2021    CALCIUM 9.5 10/13/2021     10/13/2021    K 4.7 10/13/2021    CO2 26.4 10/13/2021     10/13/2021    BUN 20 10/13/2021    CREATININE 0.99 10/13/2021    EGFRIFNONA 75 10/13/2021    BCR 20.2 10/13/2021    ANIONGAP 10.6 10/13/2021       Patient seen in no apparent distress.  "     PHYSICAL EXAM:     Foot/Ankle Exam:       General:   Appearance: elderly    Orientation: AAOx3    Affect: appropriate    Gait: unimpaired    Shoe Gear:  Casual shoes    VASCULAR      Right Foot Vascularity   Normal vascular exam    Dorsalis pedis:  1+  Posterior tibial:  1+  Skin Temperature: cool    Edema Grading:  None  CFT:  < 3 seconds  Pedal Hair Growth:  Present  Varicosities: none       Left Foot Vascularity   Normal vascular exam    Dorsalis pedis:  1+  Posterior tibial:  1+  Skin Temperature: cool    Edema Grading:  None  CFT:  < 3 seconds  Pedal Hair Growth:  Present  Varicosities: none        NEUROLOGIC     Right Foot Neurologic   Light touch sensation:  Diminished  Vibratory sensation:  Diminished  Hot/Cold sensation: diminished    Protective Sensation using Fritch-Keiko Monofilament:  2     Left Foot Neurologic   Light touch sensation:  Diminished  Vibratory sensation:  Diminished  Hot/cold sensation: diminished    Protective Sensation using Fritch-Keiko Monofilament:  2     MUSCLE STRENGTH     Right Foot Muscle Strength   Normal strength    Foot dorsiflexion:  5  Foot plantar flexion:  5  Foot inversion:  5  Foot eversion:  5     Left Foot Muscle Strength   Normal strength    Foot dorsiflexion:  5  Foot plantar flexion:  5  Foot inversion:  5  Foot eversion:  5     RANGE OF MOTION      Right Foot Range of Motion   Foot and ankle ROM within normal limits       Left Foot Range of Motion   Foot and ankle ROM within normal limits       DERMATOLOGIC     Right Foot Dermatologic   Skin: skin intact    Nails: onychomycosis, abnormally thick, subungual debris and dystrophic nails    Nails comment:  Toenails 1, 2, 3, 4, and 5     Left Foot Dermatologic   Skin: skin intact    Nails: onychomycosis, abnormally thick, subungual debris, dystrophic nails and ingrown toenail    Nails comment:  Toenails 1, 2, 3, 4, and 5      Diabetic Foot Exam Performed    ASSESSMENT/PLAN     Diagnoses and all orders for  this visit:    1. Foot pain, bilateral (Primary)    2. Onychomycosis    3. Onychocryptosis    4. Diabetic foot (HCC)    5. Type 2 diabetes mellitus with diabetic polyneuropathy, with long-term current use of insulin (HCC)    6. Neuropathy        Comprehensive lower extremity examination and evaluation was performed.    Discussed findings and treatment plan including risks, benefits, and treatment options with patient in detail. Patient agreed with treatment plan.    Toenails 1 through 5 bilaterally were debrided in thickness and length and then smoothed with a Dremel Tool.  Tolerated the procedure well without complications.    Diabetic foot exam performed and documented this date, compliant with CQM required standards. Detail of findings as noted in physical exam.  Lower extremity Neurologic exam for diabetic patient performed and documented this date, compliant with PQRS required standards. Detail of findings as noted in physical exam.  Advised patient importance of good routine lower extremity hygiene. Advised patient importance of evaluating for intact skin and pain free nail borders.  Advised patient to use mirror to evaluate plantar/ soles of feet for better visualization. Advised patient monitor and phone office to be seen if any cracking to skin, open lesions, painful nail borders or if nails become elongated prior to next visit. Advised patient importance of daily cleansing of lower extremities, followed by good skin cream to maintain normal hydration of skin. Also advised patient importance of close daily monitoring of blood sugar. Advised to regulate diet and medications to maintain control of blood sugar in optimal range. Contact primary care provider if difficulties maintaining blood sugar levels.  Advised Patient of presence of Diabetes Mellitus condition.  Advised Patient risk of progression and worsening or improvement, then return of condition.  Will monitor condition for any change in future. Treat  with most appropriate treatment pending status of condition.  Counseled and advised patient extensively on nature and ramifications of diabetes. Standard instructions given to patient for good diabetic foot care and maintenance. Advised importance of careful monitoring to avoid break down and complications secondary to diabetes. Advised patient importance of strict maintenance of blood sugar control. Advised patient of possible ominous results from neglect of condition, i.e.: amputation/ loss of digits, feet and legs, or even death.  Patient states understands counseling, will monitor closely, continue good hygiene and routine diabetic foot care. Patient will contact office is questions or problems.      An After Visit Summary was printed and given to the patient at discharge, including (if requested) any available informative/educational handouts regarding diagnosis, treatment, or medications. All questions were answered to patient/family satisfaction. Should symptoms fail to improve or worsen they agree to call or return to clinic or to go to the Emergency Department. Discussed the importance of following up with any needed screening tests/labs/specialist appointments and any requested follow-up recommended by me today. Importance of maintaining follow-up discussed and patient accepts that missed appointments can delay diagnosis and potentially lead to worsening of conditions.    Return in about 9 weeks (around 1/4/2022) for Toenail Care., or sooner if acute issues arise.    This document has been electronically signed by Wayne Foster DPM on November 2, 2021 13:51 EDT

## 2021-11-08 ENCOUNTER — OFFICE VISIT (OUTPATIENT)
Dept: CARDIOLOGY | Facility: CLINIC | Age: 71
End: 2021-11-08

## 2021-11-08 VITALS
SYSTOLIC BLOOD PRESSURE: 130 MMHG | HEART RATE: 86 BPM | HEIGHT: 66 IN | WEIGHT: 212 LBS | BODY MASS INDEX: 34.07 KG/M2 | DIASTOLIC BLOOD PRESSURE: 79 MMHG

## 2021-11-08 DIAGNOSIS — I10 ESSENTIAL HYPERTENSION: ICD-10-CM

## 2021-11-08 DIAGNOSIS — R42 POSTURAL DIZZINESS WITH PRESYNCOPE: ICD-10-CM

## 2021-11-08 DIAGNOSIS — R55 POSTURAL DIZZINESS WITH PRESYNCOPE: ICD-10-CM

## 2021-11-08 DIAGNOSIS — I25.10 CORONARY ARTERY DISEASE INVOLVING NATIVE CORONARY ARTERY OF NATIVE HEART WITHOUT ANGINA PECTORIS: Primary | ICD-10-CM

## 2021-11-08 DIAGNOSIS — E78.2 HYPERLIPEMIA, MIXED: ICD-10-CM

## 2021-11-08 PROCEDURE — 99203 OFFICE O/P NEW LOW 30 MIN: CPT | Performed by: INTERNAL MEDICINE

## 2021-11-08 NOTE — PROGRESS NOTES
Chief Complaint  Balance Issues    Subjective            Norberto Whiteside presents to [facility] CARDIOLOGY  History of present illness:    Patient is a 71-year-old male with past medical history significant for balloon angioplasty of what sounds like a diagonal branch back in .  He followed with a cardiologist for some time but then felt it would be fine just to stay with his primary care doctor.  He states overall he feels he is doing well.  He does note that when he goes from a sitting to standing position he feels like his balance is off.  He feels a little wobbly for a few seconds and then as he gets going he does perfectly fine.  He denies any presyncope or syncope.  He notes no vertigo.  When he is walking around he denies any chest pain.  He notes no palpitations.  He notes occasional ankle edema.  He denies any claudication.  His symptoms before he got the angioplasty was jaw pain and heartburn type sensation.  He notes none of that since that time.      Past Medical History:   Diagnosis Date   • Allergies    • Callus    • Cancer (HCC)     skin cancer   • Diabetes mellitus (HCC)    • Hard of hearing    • Hypertension    • Sciatica    • Sleep apnea            Past Surgical History:   Procedure Laterality Date   • ANGIOPLASTY            Social History     Socioeconomic History   • Marital status:    Tobacco Use   • Smoking status: Former Smoker     Types: Cigarettes     Quit date: 3/1/1996     Years since quittin.7   • Smokeless tobacco: Never Used   Vaping Use   • Vaping Use: Never used   Substance and Sexual Activity   • Alcohol use: Yes     Comment: rare   • Drug use: Never   • Sexual activity: Defer           Family History   Problem Relation Age of Onset   • Stroke Mother    • Cancer Mother    • Cancer Father    • Cancer Sister             No Known Allergies         Current Outpatient Medications:   •  aspirin 81 MG EC tablet, Take 81 mg by mouth Daily., Disp: , Rfl:  "  •  atenolol (TENORMIN) 50 MG tablet, Take 50 mg by mouth Daily., Disp: , Rfl:   •  atorvastatin (LIPITOR) 40 MG tablet, Take 40 mg by mouth Daily., Disp: , Rfl:   •  docusate sodium (COLACE) 100 MG capsule, Take 100 mg by mouth 3 (Three) Times a Day., Disp: , Rfl:   •  empagliflozin (Jardiance) 25 MG tablet tablet, Take 25 mg by mouth Daily., Disp: , Rfl:   •  exenatide er (Bydureon) 2 MG pen-injector injection, Once a week, Disp: , Rfl:   •  gabapentin (NEURONTIN) 600 MG tablet, Take 600 mg by mouth 3 (Three) Times a Day., Disp: , Rfl:   •  insulin glargine (LANTUS, SEMGLEE) 100 UNIT/ML injection, Inject 10 Units under the skin into the appropriate area as directed Daily., Disp: , Rfl:   •  Insulin Pen Needle (Pen Needles 3/16\") 31G X 5 MM misc, 1 kit 2 (two) times a day., Disp: 180 each, Rfl: 0  •  Lancets misc, 1 kit 2 (two) times a day., Disp: 180 each, Rfl: 0  •  lisinopril-hydrochlorothiazide (PRINZIDE,ZESTORETIC) 20-12.5 MG per tablet, qd, Disp: , Rfl:   •  loratadine (CLARITIN) 10 MG tablet, , Disp: , Rfl:   •  metFORMIN (GLUCOPHAGE) 1000 MG tablet, Take 1,000 mg by mouth 2 (Two) Times a Day With Meals., Disp: , Rfl:   •  multivitamin with minerals (MULTIVITAMIN ADULTS PO), Take 1 tablet by mouth Daily., Disp: , Rfl:   •  pantoprazole (PROTONIX) 40 MG EC tablet, Daily., Disp: , Rfl:   •  polyethylene glycol (MiraLax) 17 GM/SCOOP powder, Daily., Disp: , Rfl:   •  traMADol (ULTRAM) 50 MG tablet, 1-2 tablets bid prn, Disp: 120 tablet, Rfl: 2      ROS:  Cardiac review of systems is negative.    Objective     /79   Pulse 86   Ht 167.6 cm (66\")   Wt 96.2 kg (212 lb)   BMI 34.22 kg/m²       General Appearance:   · well developed  · well nourished  HENT:   · oropharynx moist  · lips not cyanotic  Respiratory:  · no respiratory distress  · normal breath sounds  · no rales  Cardiovascular:  · no jugular venous distention  · regular rhythm  · S1 normal, S2 normal  · no S3, no S4   · no murmur  · no rub, no " thrill  · No carotid bruit  · pedal pulses normal  · lower extremity edema: none    Musculoskeletal:  · no clubbing of fingers.   · normocephalic, head atraumatic  Skin:   · warm, dry  Psychiatric:  · judgement and insight appropriate  · normal mood and affect          Procedures                      ASSESSMENT:  Encounter Diagnoses   Name Primary?   • Coronary artery disease involving native coronary artery of native heart without angina pectoris Yes   • Essential hypertension    • Hyperlipemia, mixed    • Postural dizziness with presyncope          PLAN:    1.  The patient basically came in due to this going from sitting to standing and feeling like his balance is off.  I did check orthostatic blood pressure measurements and his blood pressure and heart rate remained completely stable.  He states this has been going on for a while and once he gets up and the symptoms resolve in 2 to 3 seconds he does fine.  He does drink caffeinated tea and not a lot of water.  Would increase his water intake.  2.  Patient is well medicated for his history of coronary artery disease.  He is on a good cholesterol pill and his blood pressure is under good control.  He is on a baby aspirin each day.  3.  Patient really notes no cardiac symptoms.  I told him we could follow him once a year with this history of coronary artery disease but he would rather just follow with the primary care who he sees every 3 months and then call us if any heart problems.          Patient was given instructions and counseling regarding his condition or for health maintenance advice. Please see specific information pulled into the AVS if appropriate.         Irvin Russell MD   11/8/2021  12:50 EST

## 2022-01-10 NOTE — PRE-PROCEDURE INSTRUCTIONS
Pt. Instructed on laxative and skin prep, pre-op meds, clear liquid diet. Ok to take Atenolol, Lipitor, Gabapentin, Protonix, Tramadol a.m. of procedure.

## 2022-01-11 NOTE — H&P
Chief Complaint  No chief complaint on file.    Subjective          Norberto Whiteside presents to [facility]  Patient is a 71-year-old white/ male that presents to general surgery office for colonoscopy consultation.    Patient admits to some constipation issues.  Reports that he only has a bowel movement every 3 days.    Admits to recently starting stool softeners 3 times a day and MiraLAX daily.  Reports it has improved his symptoms some.    Denies any change in bowel habit or rectal bleeding.    Admits to history of colorectal cancer with his paternal uncle.    Reports last colonoscopy was 10 years ago at [place] and had a few small polyps removed.    Patient has no known drug allergies.  Outpatient Medications Marked as Taking for the 1/12/22 encounter (Hospital Encounter)   Medication Sig Dispense Refill   • aspirin 81 MG EC tablet Take 81 mg by mouth Daily.     • atenolol (TENORMIN) 50 MG tablet Take 50 mg by mouth Daily.     • atorvastatin (LIPITOR) 40 MG tablet Take 40 mg by mouth Daily.     • docusate sodium (COLACE) 100 MG capsule Take 100 mg by mouth 3 (Three) Times a Day.     • empagliflozin (Jardiance) 25 MG tablet tablet Take 25 mg by mouth Daily.     • exenatide er (Bydureon) 2 MG pen-injector injection Once a week     • gabapentin (NEURONTIN) 600 MG tablet Take 600 mg by mouth 3 (Three) Times a Day.     • insulin glargine (LANTUS, SEMGLEE) 100 UNIT/ML injection Inject 10 Units under the skin into the appropriate area as directed.     • lisinopril-hydrochlorothiazide (PRINZIDE,ZESTORETIC) 20-12.5 MG per tablet Take 1 tablet by mouth Daily.     • loratadine (CLARITIN) 10 MG tablet 10 mg Daily.     • metFORMIN (GLUCOPHAGE) 1000 MG tablet Take 1,000 mg by mouth 2 (Two) Times a Day With Meals.     • multivitamin with minerals (MULTIVITAMIN ADULTS PO) Take 1 tablet by mouth Daily.     • pantoprazole (PROTONIX) 40 MG EC tablet Take 40 mg by mouth.     • traMADol (ULTRAM) 50 MG  "tablet 1-2 tablets bid prn 120 tablet 2     Patient Active Problem List   Diagnosis   • Constipation   • History of colonic polyps   • Chronic fatigue syndrome   • Mixed hyperlipidemia   • Unstable angina pectoris due to coronary arteriosclerosis ([facility])   • Type 2 diabetes mellitus with hyperglycemia, with long-term current use of insulin ([facility])   • Primary osteoarthritis involving multiple joints   • Primary hypertension   • Class 2 severe obesity due to excess calories with serious comorbidity and body mass index (BMI) of 38.0 to 38.9 in adult ([facility])         Objective   Vital Signs:   Ht 167.7 cm (66.02\")   Wt 96.7 kg (213 lb 3 oz)   BMI 34.38 kg/m²     Physical Exam  Constitutional:       Appearance: Normal appearance.   Cardiovascular:      Rate and Rhythm: Normal rate.   Pulmonary:      Effort: Pulmonary effort is normal.   Abdominal:      General: Abdomen is flat.      Palpations: Abdomen is soft.   Skin:     General: Skin is warm and dry.   Neurological:      General: No focal deficit present.      Mental Status: He is alert and oriented to person, place, and time.   Psychiatric:         Mood and Affect: Mood normal.         Behavior: Behavior normal.        Result Review :                 Assessment  Screening colonoscopy  Constipation    Plan  Colonoscopy    Risks and benefits discussed    Electronically signed by Kanu Meade MD, 01/11/22, 1:55 PM EST.      "

## 2022-01-12 ENCOUNTER — ANESTHESIA (OUTPATIENT)
Dept: GASTROENTEROLOGY | Facility: HOSPITAL | Age: 72
End: 2022-01-12

## 2022-01-12 ENCOUNTER — HOSPITAL ENCOUNTER (OUTPATIENT)
Facility: HOSPITAL | Age: 72
Setting detail: HOSPITAL OUTPATIENT SURGERY
Discharge: HOME | End: 2022-01-12
Attending: SURGERY | Admitting: SURGERY

## 2022-01-12 ENCOUNTER — ANESTHESIA EVENT (OUTPATIENT)
Dept: GASTROENTEROLOGY | Facility: HOSPITAL | Age: 72
End: 2022-01-12

## 2022-01-12 VITALS
BODY MASS INDEX: 34.23 KG/M2 | SYSTOLIC BLOOD PRESSURE: 139 MMHG | TEMPERATURE: 97.5 F | DIASTOLIC BLOOD PRESSURE: 87 MMHG | RESPIRATION RATE: 16 BRPM | HEIGHT: 66 IN | WEIGHT: 212.96 LBS | OXYGEN SATURATION: 95 % | HEART RATE: 87 BPM

## 2022-01-12 DIAGNOSIS — Z86.010 HISTORY OF COLONIC POLYPS: ICD-10-CM

## 2022-01-12 DIAGNOSIS — K59.00 CONSTIPATION: ICD-10-CM

## 2022-01-12 LAB — GLUCOSE BLDC GLUCOMTR-MCNC: 174 MG/DL (ref 70–99)

## 2022-01-12 PROCEDURE — 82962 GLUCOSE BLOOD TEST: CPT

## 2022-01-12 PROCEDURE — 88305 TISSUE EXAM BY PATHOLOGIST: CPT | Performed by: SURGERY

## 2022-01-12 PROCEDURE — 25010000002 PROPOFOL 10 MG/ML EMULSION: Performed by: NURSE ANESTHETIST, CERTIFIED REGISTERED

## 2022-01-12 RX ORDER — SODIUM CHLORIDE 0.9 % (FLUSH) 0.9 %
10 SYRINGE (ML) INJECTION AS NEEDED
Status: DISCONTINUED | OUTPATIENT
Start: 2022-01-12 | End: 2022-01-12 | Stop reason: HOSPADM

## 2022-01-12 RX ORDER — LIDOCAINE HYDROCHLORIDE 20 MG/ML
INJECTION, SOLUTION INFILTRATION; PERINEURAL AS NEEDED
Status: DISCONTINUED | OUTPATIENT
Start: 2022-01-12 | End: 2022-01-12 | Stop reason: SURG

## 2022-01-12 RX ORDER — SODIUM CHLORIDE, SODIUM LACTATE, POTASSIUM CHLORIDE, CALCIUM CHLORIDE 600; 310; 30; 20 MG/100ML; MG/100ML; MG/100ML; MG/100ML
30 INJECTION, SOLUTION INTRAVENOUS CONTINUOUS
Status: DISCONTINUED | OUTPATIENT
Start: 2022-01-12 | End: 2022-01-12 | Stop reason: HOSPADM

## 2022-01-12 RX ORDER — PROPOFOL 10 MG/ML
VIAL (ML) INTRAVENOUS AS NEEDED
Status: DISCONTINUED | OUTPATIENT
Start: 2022-01-12 | End: 2022-01-12 | Stop reason: SURG

## 2022-01-12 RX ORDER — SODIUM CHLORIDE 0.9 % (FLUSH) 0.9 %
3 SYRINGE (ML) INJECTION EVERY 12 HOURS SCHEDULED
Status: DISCONTINUED | OUTPATIENT
Start: 2022-01-12 | End: 2022-01-12 | Stop reason: HOSPADM

## 2022-01-12 RX ADMIN — PROPOFOL 50 MG: 10 INJECTION, EMULSION INTRAVENOUS at 08:01

## 2022-01-12 RX ADMIN — PROPOFOL 200 MCG/KG/MIN: 10 INJECTION, EMULSION INTRAVENOUS at 08:01

## 2022-01-12 RX ADMIN — SODIUM CHLORIDE, POTASSIUM CHLORIDE, SODIUM LACTATE AND CALCIUM CHLORIDE 30 ML/HR: 600; 310; 30; 20 INJECTION, SOLUTION INTRAVENOUS at 07:40

## 2022-01-12 RX ADMIN — LIDOCAINE HYDROCHLORIDE 60 MG: 20 INJECTION, SOLUTION INFILTRATION; PERINEURAL at 08:01

## 2022-01-12 NOTE — DISCHARGE INSTRUCTIONS
Dr. Meade's Instructions       • Next colonoscopy in 5 years.    • Follow-up with your primary care clinician at your next scheduled appointment time.    • Drink at least 80 ounces of water daily.    • Maintain a high fiber diet aiming for at least 25 grams of fiber daily.

## 2022-01-12 NOTE — ANESTHESIA PREPROCEDURE EVALUATION
Anesthesia Evaluation     Patient summary reviewed and Nursing notes reviewed   no history of anesthetic complications:  NPO Solid Status: > 8 hours  NPO Liquid Status: > 2 hours           Airway   Mallampati: II  TM distance: >3 FB  Neck ROM: full  No difficulty expected  Dental      Pulmonary - normal exam    breath sounds clear to auscultation  (+) sleep apnea,   Cardiovascular - normal exam  Exercise tolerance: good (4-7 METS)    Rhythm: regular    (+) hypertension, CAD,       Neuro/Psych  (+) numbness,     GI/Hepatic/Renal/Endo    (+) obesity,   diabetes mellitus,     Musculoskeletal     Abdominal    Substance History - negative use     OB/GYN negative ob/gyn ROS         Other   arthritis,                      Anesthesia Plan    ASA 3     general   total IV anesthesia    Anesthetic plan, all risks, benefits, and alternatives have been provided, discussed and informed consent has been obtained with: patient.

## 2022-01-12 NOTE — ANESTHESIA POSTPROCEDURE EVALUATION
Patient: Norberto Whiteside    Procedure Summary     Date: 01/12/22 Room / Location: [facility] ENDOSCOPY 1 / [facility] ENDOSCOPY    Anesthesia Start: 0759 Anesthesia Stop: 0829    Procedure: COLONOSCOPY with polypectomies (N/A ) Diagnosis:       Constipation      History of colonic polyps      (Constipation [K59.00])      (History of colonic polyps [Z86.010])    Surgeons: Kanu Meade MD Provider: Desean Marti MD    Anesthesia Type: general ASA Status: 3          Anesthesia Type: general    Vitals  Vitals Value Taken Time   /71 01/12/22 0832   Temp 36.3 °C (97.3 °F) 01/12/22 0827   Pulse 94 01/12/22 0834   Resp 12 01/12/22 0830   SpO2 98 % 01/12/22 0834   Vitals shown include unvalidated device data.        Post Anesthesia Care and Evaluation    Patient location during evaluation: bedside  Patient participation: complete - patient participated  Level of consciousness: awake  Pain management: adequate  Airway patency: patent  Anesthetic complications: No anesthetic complications  PONV Status: none  Cardiovascular status: acceptable and stable  Respiratory status: acceptable  Hydration status: acceptable    Comments: An Anesthesiologist personally participated in the most demanding procedures (including induction and emergence if applicable) in the anesthesia plan, monitored the course of anesthesia administration at frequent intervals and remained physically present and available for immediate diagnosis and treatment of emergencies.

## 2022-01-13 LAB
CYTO UR: NORMAL
LAB AP CASE REPORT: NORMAL
LAB AP CLINICAL INFORMATION: NORMAL
PATH REPORT.FINAL DX SPEC: NORMAL
PATH REPORT.GROSS SPEC: NORMAL

## 2022-01-14 ENCOUNTER — LAB (OUTPATIENT)
Dept: LAB | Facility: HOSPITAL | Age: 72
End: 2022-01-14

## 2022-01-14 DIAGNOSIS — R53.83 OTHER FATIGUE: ICD-10-CM

## 2022-01-14 DIAGNOSIS — Z79.4 TYPE 2 DIABETES MELLITUS WITH HYPERGLYCEMIA, WITH LONG-TERM CURRENT USE OF INSULIN: ICD-10-CM

## 2022-01-14 DIAGNOSIS — I10 PRIMARY HYPERTENSION: ICD-10-CM

## 2022-01-14 DIAGNOSIS — E11.65 TYPE 2 DIABETES MELLITUS WITH HYPERGLYCEMIA, WITH LONG-TERM CURRENT USE OF INSULIN: ICD-10-CM

## 2022-01-14 DIAGNOSIS — E78.2 MIXED HYPERLIPIDEMIA: ICD-10-CM

## 2022-01-14 LAB
ALBUMIN SERPL-MCNC: 4.3 G/DL (ref 3.5–5.2)
ALBUMIN/GLOB SERPL: 1.7 G/DL
ALP SERPL-CCNC: 73 U/L (ref 39–117)
ALT SERPL W P-5'-P-CCNC: 25 U/L (ref 1–41)
ANION GAP SERPL CALCULATED.3IONS-SCNC: 10.5 MMOL/L (ref 5–15)
AST SERPL-CCNC: 29 U/L (ref 1–40)
BILIRUB SERPL-MCNC: 0.3 MG/DL (ref 0–1.2)
BUN SERPL-MCNC: 15 MG/DL (ref 8–23)
BUN/CREAT SERPL: 14 (ref 7–25)
CALCIUM SPEC-SCNC: 10 MG/DL (ref 8.6–10.5)
CHLORIDE SERPL-SCNC: 103 MMOL/L (ref 98–107)
CHOLEST SERPL-MCNC: 143 MG/DL (ref 0–200)
CO2 SERPL-SCNC: 26.5 MMOL/L (ref 22–29)
CREAT SERPL-MCNC: 1.07 MG/DL (ref 0.76–1.27)
GFR SERPL CREATININE-BSD FRML MDRD: 69 ML/MIN/1.73
GLOBULIN UR ELPH-MCNC: 2.6 GM/DL
GLUCOSE SERPL-MCNC: 205 MG/DL (ref 65–99)
HBA1C MFR BLD: 8.76 % (ref 4.8–5.6)
HDLC SERPL-MCNC: 30 MG/DL (ref 40–60)
LDLC SERPL CALC-MCNC: 56 MG/DL (ref 0–100)
LDLC/HDLC SERPL: 1.27 {RATIO}
POTASSIUM SERPL-SCNC: 5 MMOL/L (ref 3.5–5.2)
PROT SERPL-MCNC: 6.9 G/DL (ref 6–8.5)
SODIUM SERPL-SCNC: 140 MMOL/L (ref 136–145)
T4 FREE SERPL-MCNC: 1.17 NG/DL (ref 0.93–1.7)
TRIGL SERPL-MCNC: 374 MG/DL (ref 0–150)
TSH SERPL DL<=0.05 MIU/L-ACNC: 2.14 UIU/ML (ref 0.27–4.2)
VLDLC SERPL-MCNC: 57 MG/DL (ref 5–40)

## 2022-01-14 PROCEDURE — 80061 LIPID PANEL: CPT

## 2022-01-14 PROCEDURE — 83036 HEMOGLOBIN GLYCOSYLATED A1C: CPT

## 2022-01-14 PROCEDURE — 36415 COLL VENOUS BLD VENIPUNCTURE: CPT

## 2022-01-14 PROCEDURE — 80053 COMPREHEN METABOLIC PANEL: CPT

## 2022-01-14 PROCEDURE — 84439 ASSAY OF FREE THYROXINE: CPT

## 2022-01-14 PROCEDURE — 84443 ASSAY THYROID STIM HORMONE: CPT

## 2022-01-19 NOTE — ASSESSMENT & PLAN NOTE
Had increase symptoms and is back and shoulders from happened care for his wife the last several months.  He treated this with Tylenol and some older muscle relaxers he had.---> Having some issues about of his neck and between his shoulder blades as of 1/22.  He is using Tylenol and tramadol for this.  Continue same.

## 2022-01-19 NOTE — ASSESSMENT & PLAN NOTE
LDL is up from 60 to 82 as of 10/21 office visit.  He previously was very low, LDL less than 30 when he was on 80 mg of Lipitor.  He was stable on 40 mg, will continue that for now, and he will address it with lifestyle changes for now.---> LDL back down to 56 as of 1/22, that is easily at goal, patient stable to continue with moderate dose atorvastatin.

## 2022-01-19 NOTE — PROGRESS NOTES
"Chief Complaint  Diabetes and Follow-up (No new issues)    Subjective          Norberto Whiteside presents to [facility] INTER[facility]IC[facility]     History of Present Illness  Patient 71-year-old male with underlying diabetes mellitus, hypertension, hyperlipidemia, resultant coronary artery disease with prior stent, who is coming in 1/22 for routine 3-month follow-up.  We will review his labs and make further recommendations at that time.    Review of Systems   Constitutional: Negative for appetite change, fatigue and fever.   HENT: Negative for congestion and ear pain.    Eyes: Negative for blurred vision.   Respiratory: Negative for cough, chest tightness, shortness of breath and wheezing.    Cardiovascular: Negative for chest pain, palpitations and leg swelling.   Gastrointestinal: Negative for abdominal pain.   Genitourinary: Negative for difficulty urinating, dysuria and hematuria.   Musculoskeletal: Negative for arthralgias and gait problem.   Skin: Negative for skin lesions.   Neurological: Negative for syncope, memory problem and confusion.   Psychiatric/Behavioral: Negative for self-injury and depressed mood.       Objective   Vital Signs:   /80   Pulse 86   Temp 97.2 °F (36.2 °C)   Ht 167.7 cm (66.02\")   Wt 100 kg (220 lb 9.6 oz)   SpO2 98%   BMI 35.58 kg/m²           Physical Exam  Vitals and nursing note reviewed.   Constitutional:       General: He is not in acute distress.     Appearance: Normal appearance. He is not toxic-appearing.   HENT:      Head: Atraumatic.      Right Ear: External ear normal.      Left Ear: External ear normal.      Nose: Nose normal.      Mouth/Throat:      Mouth: Mucous membranes are moist.   Eyes:      General:         Right eye: No discharge.         Left eye: No discharge.      Extraocular Movements: Extraocular movements intact.      Pupils: Pupils are equal, round, and reactive to light.   Cardiovascular:      Rate and Rhythm: Normal rate and regular " rhythm.      Pulses: Normal pulses.      Heart sounds: Normal heart sounds. No murmur heard.  No gallop.    Pulmonary:      Effort: Pulmonary effort is normal. No respiratory distress.      Breath sounds: No wheezing, rhonchi or rales.   Abdominal:      General: There is no distension.      Palpations: Abdomen is soft. There is no mass.      Tenderness: There is no abdominal tenderness. There is no guarding.   Musculoskeletal:         General: No swelling or tenderness.      Cervical back: No tenderness.      Right lower leg: No edema.      Left lower leg: No edema.   Skin:     General: Skin is warm and dry.      Findings: No rash.   Neurological:      General: No focal deficit present.      Mental Status: He is alert and oriented to person, place, and time. Mental status is at baseline.      Motor: No weakness.      Gait: Gait normal.   Psychiatric:         Mood and Affect: Mood normal.         Thought Content: Thought content normal.          Result Review :   The following data was reviewed by: Moreno Pereira MD on 10/05/2021:                  Assessment and Plan    Diagnoses and all orders for this visit:    1. Mixed hyperlipidemia (Primary)  Assessment & Plan:  LDL is up from 60 to 82 as of 10/21 office visit.  He previously was very low, LDL less than 30 when he was on 80 mg of Lipitor.  He was stable on 40 mg, will continue that for now, and he will address it with lifestyle changes for now.---> LDL back down to 56 as of 1/22, that is easily at goal, patient stable to continue with moderate dose atorvastatin.        2. Unstable angina pectoris due to coronary arteriosclerosis (HCC)  Overview:  CAD s/p PTCA '96 with need for SPECT soonish=been too long it sound like; no sxs at least---> he saw Dr. Russell in 11/21, and he did not feel there was any indication for stress imaging study/etc.    Assessment & Plan:  Patient no ischemia as of 1/22 office visit.  He is stable on baby aspirin and atenolol for his  antianginal regimen, continue same, patient knows to call if he has any chest pain or pressure.      3. Type 2 diabetes mellitus with hyperglycemia, with long-term current use of insulin (HCC)  Overview:  A1c remains stable at 7.1 as of 10/21 office visit.  The patient has been maxed out on Jardiance, Bydureon, and Metformin for quite some time.  Despite this his A1c was stuck at about 8.5.  We added low-dose Lantus in 5/20, and he has had excellent control since then.      Assessment & Plan:  A1c is from 7.1 to 8.6 as of 1/22.  This is the 1st time in about 2 years that has been like this, patient is trying to adjust his diet and going to increase his activity with his treadmill.  Recommended that he try to titrate his Lantus by 2 units every 4 days or so based on what his morning sugars are doing.  Discussed he could certainly back down on the Lantus to the 10 units gradually when his sugars start improving.    (TSH is normal as of 1/22).    Orders:  -     Hemoglobin A1c; Future  -     Basic Metabolic Panel; Future    4. Primary hypertension  Assessment & Plan:  Blood pressure with reasonable control here in the office, and good control at home as of 1/22 office visit.  Patient is stable to continue treatment with moderate dose Prinzide as well as moderate dose atenolol.         5. History of colonic polyps  Assessment & Plan:  Colonoscopy in 1/22 by Dr. Meade revealed to 5 mm adenomatous polyps.  Recommendation is for repeat colonoscopy in 5 years.      6. Primary osteoarthritis involving multiple joints  Assessment & Plan:  Had increase symptoms and is back and shoulders from happened care for his wife the last several months.  He treated this with Tylenol and some older muscle relaxers he had.---> Having some issues about of his neck and between his shoulder blades as of 1/22.  He is using Tylenol and tramadol for this.  Continue same.        7. Gastroesophageal reflux disease without esophagitis  Assessment &  Plan:  Patient no dysphagia no significant dyspepsia on PPI for the past 6 months or so.  Patient stable to continue current dose of Protonix as of 1/22.      Other orders  -     atenolol (TENORMIN) 50 MG tablet; Take 1 tablet by mouth Daily.  Dispense: 90 tablet; Refill: 1  -     lisinopril-hydrochlorothiazide (PRINZIDE,ZESTORETIC) 20-12.5 MG per tablet; Take 1 tablet by mouth Daily.  Dispense: 90 tablet; Refill: 1     --  --  OLDER NOTES:  VISIT 6/21:  ANNUAL MEDICARE WELLNESS PHYSICAL 9/17 = reviewed all forms with pt in office; no new concerns raised.  DM = A1C as below and OPTHO=20/20 and saw them in spring '18.  --  DM 2 ('15) and was started on Tanzem 3 mo ago and low dose amaryl was stopped; needs repeat labs, but FBS still in 280 ballpark; last A1C=9.9; will increase Tanzem before add another agent...down 10.8 to 8.5 past 4 months, so no invokana yet...7.6 is great trend, so no changes 1/18...8.5 again so needs jardiance/etc now since this is despite wt down some at 5/18 OV; also, may lose tanzem he tells me...8.2 is w/o any new agents, wt is still trending down, so will wait until after new year to add another if still in the 8's...8.4 and he will find out which one is covered...8.3 and got on Jardiance, so need to max it out as of 5/19 OV...7.9 is ok for now at least...8.1 and he blames it on his diet around holiday time; maxed out on 3 meds; will use lantus if same on RTO...8.3 and I d/w he needs Lantus now=10U qd and titrate...7.4 is nice drop already 9/20---> 7.1 is better 6/21.  (Micro-alb neg 5/20)  --  CAD s/p PTCA '96 with need for SPECT soonish=been too long it sound like; no sxs at least---> needs eval as of 6/21 for dizzy/weak/feels off;   LIPIDS with LDL 14 and TG's 400, ? lab error; will repeat prior to changes...LDL 43 with TG's < 200 is fine...LDL 37/TG's 300 baseline 12/19...LDL 27, so will lower dose now to 40 mg---> 60 is better 1/21  --  HTN remains well controlled and ok to lower to 1/2  tab ACEI/HCT if stays low at home.  ?CKD3 = 52% and will get on RTO in '19... >60%... 40% out of the blue 9/19---> 55% holding 6/21.  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  NEUROPATHY is worse 5/20 and I d/w anodyne is an option; worse when active; will use PRN ultram.  OBESITY with BMI 37 ballpark (TSH neg 5/18).  --  --  [facility].  COLON 1/12 = 2 polyps = 5 yrs per Dr Meade.  Pneumovax x2 ; Prevnar rec 9/17.  Covid vaccine x3 with Pfizer as of 1/22.  Flu shot for 2021 season given 10/21 office visit.  ( 10/21 after 32 years of marriage and she was 10 yrs older = 81 when she passsanya Sun had severe dementia at the end; retired  and then  and retired '14, 1 girl in town)    Follow Up   No follow-ups on file.  Patient was given instructions and counseling regarding his condition or for health maintenance advice. Please see specific information pulled into the AVS if appropriate.

## 2022-01-19 NOTE — ASSESSMENT & PLAN NOTE
Blood pressure with reasonable control here in the office, and good control at home as of 1/22 office visit.  Patient is stable to continue treatment with moderate dose Prinzide as well as moderate dose atenolol.

## 2022-01-20 ENCOUNTER — OFFICE VISIT (OUTPATIENT)
Dept: INTERNAL MEDICINE | Facility: CLINIC | Age: 72
End: 2022-01-20

## 2022-01-20 VITALS
HEART RATE: 86 BPM | TEMPERATURE: 97.2 F | OXYGEN SATURATION: 98 % | WEIGHT: 220.6 LBS | DIASTOLIC BLOOD PRESSURE: 80 MMHG | SYSTOLIC BLOOD PRESSURE: 155 MMHG | BODY MASS INDEX: 35.45 KG/M2 | HEIGHT: 66 IN

## 2022-01-20 DIAGNOSIS — I10 PRIMARY HYPERTENSION: ICD-10-CM

## 2022-01-20 DIAGNOSIS — M15.9 PRIMARY OSTEOARTHRITIS INVOLVING MULTIPLE JOINTS: ICD-10-CM

## 2022-01-20 DIAGNOSIS — E11.65 TYPE 2 DIABETES MELLITUS WITH HYPERGLYCEMIA, WITH LONG-TERM CURRENT USE OF INSULIN: ICD-10-CM

## 2022-01-20 DIAGNOSIS — Z86.010 HISTORY OF COLONIC POLYPS: ICD-10-CM

## 2022-01-20 DIAGNOSIS — K21.9 GASTROESOPHAGEAL REFLUX DISEASE WITHOUT ESOPHAGITIS: ICD-10-CM

## 2022-01-20 DIAGNOSIS — I25.110 UNSTABLE ANGINA PECTORIS DUE TO CORONARY ARTERIOSCLEROSIS: ICD-10-CM

## 2022-01-20 DIAGNOSIS — Z79.4 TYPE 2 DIABETES MELLITUS WITH HYPERGLYCEMIA, WITH LONG-TERM CURRENT USE OF INSULIN: ICD-10-CM

## 2022-01-20 DIAGNOSIS — E78.2 MIXED HYPERLIPIDEMIA: Primary | ICD-10-CM

## 2022-01-20 PROCEDURE — 99214 OFFICE O/P EST MOD 30 MIN: CPT | Performed by: INTERNAL MEDICINE

## 2022-01-20 RX ORDER — LISINOPRIL AND HYDROCHLOROTHIAZIDE 20; 12.5 MG/1; MG/1
1 TABLET ORAL DAILY
Qty: 90 TABLET | Refills: 1 | Status: SHIPPED | OUTPATIENT
Start: 2022-01-20 | End: 2022-07-25 | Stop reason: SDUPTHER

## 2022-01-20 RX ORDER — ATENOLOL 50 MG/1
50 TABLET ORAL DAILY
Qty: 90 TABLET | Refills: 1 | Status: SHIPPED | OUTPATIENT
Start: 2022-01-20 | End: 2022-07-25 | Stop reason: SDUPTHER

## 2022-01-20 NOTE — ASSESSMENT & PLAN NOTE
Patient no ischemia as of 1/22 office visit.  He is stable on baby aspirin and atenolol for his antianginal regimen, continue same, patient knows to call if he has any chest pain or pressure.

## 2022-01-20 NOTE — ASSESSMENT & PLAN NOTE
A1c is from 7.1 to 8.6 as of 1/22.  This is the 1st time in about 2 years that has been like this, patient is trying to adjust his diet and going to increase his activity with his treadmill.  Recommended that he try to titrate his Lantus by 2 units every 4 days or so based on what his morning sugars are doing.  Discussed he could certainly back down on the Lantus to the 10 units gradually when his sugars start improving.    (TSH is normal as of 1/22).

## 2022-01-20 NOTE — ASSESSMENT & PLAN NOTE
Colonoscopy in 1/22 by Dr. Meade revealed to 5 mm adenomatous polyps.  Recommendation is for repeat colonoscopy in 5 years.

## 2022-01-20 NOTE — ASSESSMENT & PLAN NOTE
Patient no dysphagia no significant dyspepsia on PPI for the past 6 months or so.  Patient stable to continue current dose of Protonix as of 1/22.

## 2022-01-25 ENCOUNTER — OFFICE VISIT (OUTPATIENT)
Dept: PODIATRY | Facility: CLINIC | Age: 72
End: 2022-01-25

## 2022-01-25 VITALS
HEIGHT: 66 IN | HEART RATE: 84 BPM | SYSTOLIC BLOOD PRESSURE: 147 MMHG | OXYGEN SATURATION: 98 % | WEIGHT: 217 LBS | TEMPERATURE: 97.3 F | BODY MASS INDEX: 34.87 KG/M2 | DIASTOLIC BLOOD PRESSURE: 61 MMHG

## 2022-01-25 DIAGNOSIS — Z79.4 TYPE 2 DIABETES MELLITUS WITH DIABETIC POLYNEUROPATHY, WITH LONG-TERM CURRENT USE OF INSULIN: ICD-10-CM

## 2022-01-25 DIAGNOSIS — M79.672 FOOT PAIN, BILATERAL: Primary | ICD-10-CM

## 2022-01-25 DIAGNOSIS — E11.8 DIABETIC FOOT: ICD-10-CM

## 2022-01-25 DIAGNOSIS — M79.671 FOOT PAIN, BILATERAL: Primary | ICD-10-CM

## 2022-01-25 DIAGNOSIS — E11.42 TYPE 2 DIABETES MELLITUS WITH DIABETIC POLYNEUROPATHY, WITH LONG-TERM CURRENT USE OF INSULIN: ICD-10-CM

## 2022-01-25 DIAGNOSIS — L60.0 ONYCHOCRYPTOSIS: ICD-10-CM

## 2022-01-25 DIAGNOSIS — G62.9 NEUROPATHY: ICD-10-CM

## 2022-01-25 DIAGNOSIS — B35.1 ONYCHOMYCOSIS: ICD-10-CM

## 2022-01-25 PROCEDURE — 11721 DEBRIDE NAIL 6 OR MORE: CPT | Performed by: PODIATRIST

## 2022-01-25 PROCEDURE — G8404 LOW EXTEMITY NEUR EXAM DOCUM: HCPCS | Performed by: PODIATRIST

## 2022-01-25 NOTE — PROGRESS NOTES
[facility] - PODIATRY    Today's Date: 22    Patient Name: Norberto Whiteside  MRN: [id number]  CSN: [id number]  PCP: Moreno Pereira MD, Last PCP Visit: 2022  Referring Provider: No ref. provider found    SUBJECTIVE     Chief Complaint   Patient presents with   • Left Foot - Nail Problem   • Right Foot - Nail Problem     HPI: Norberto Whiteside, a 71 y.o.male, comes to clinic.    New, Established, New Problem:  est    Location:  Toenails    Duration:   Greater than five years    Onset:  Gradual    Nature:  sore with palpation.    Stable, worsening, improving:   improving    Aggravating factors:  Pain with shoe gear and ambulation.    Previous Treatment:  debridement    Patient reported last blood glucose: 140    Pt reports his wife past away since his last visit.    Patient denies any fevers, chills, nausea, vomiting, shortness of breathe, nor any other constitutional signs nor symptoms.       Past Medical History:   Diagnosis Date   • Allergic     Seasonal   • Allergies    • Callus    • Cancer (HCC)     skin cancer   • Diabetes mellitus (HCC)    • Hard of hearing    • Hypertension    • Sciatica    • Sleep apnea      Past Surgical History:   Procedure Laterality Date   • ANGIOPLASTY     • COLONOSCOPY N/A 2022    Procedure: COLONOSCOPY with polypectomies;  Surgeon: Kanu Meade MD;  Location: [facility] ENDOSCOPY;  Service: General;  Laterality: N/A;  colon polyps     Family History   Problem Relation Age of Onset   • Stroke Mother    • Cancer Mother    • Cancer Father    • Cancer Sister    • Malig Hyperthermia Neg Hx      Social History     Socioeconomic History   • Marital status:    Tobacco Use   • Smoking status: Former Smoker     Types: Cigarettes     Quit date: 3/1/1996     Years since quittin.9   • Smokeless tobacco: Never Used   Vaping Use   • Vaping Use: Never used   Substance and Sexual Activity   • Alcohol use: Yes     Comment: rare   • Drug use: Never   • Sexual  "activity: Defer     No Known Allergies  Current Outpatient Medications   Medication Sig Dispense Refill   • aspirin 81 MG EC tablet Take 81 mg by mouth Daily.     • atenolol (TENORMIN) 50 MG tablet Take 1 tablet by mouth Daily. 90 tablet 1   • atorvastatin (LIPITOR) 40 MG tablet Take 40 mg by mouth Daily.     • docusate sodium (COLACE) 100 MG capsule Take 100 mg by mouth 3 (Three) Times a Day.     • empagliflozin (Jardiance) 25 MG tablet tablet Take 25 mg by mouth Daily.     • exenatide er (Bydureon) 2 MG pen-injector injection Once a week     • gabapentin (NEURONTIN) 600 MG tablet Take 600 mg by mouth 3 (Three) Times a Day.     • insulin glargine (LANTUS, SEMGLEE) 100 UNIT/ML injection Inject 10 Units under the skin into the appropriate area as directed.     • Insulin Pen Needle (Pen Needles 3/16\") 31G X 5 MM misc 1 kit 2 (two) times a day. 180 each 0   • Lancets misc 1 kit 2 (two) times a day. 180 each 0   • lisinopril-hydrochlorothiazide (PRINZIDE,ZESTORETIC) 20-12.5 MG per tablet Take 1 tablet by mouth Daily. 90 tablet 1   • loratadine (CLARITIN) 10 MG tablet 10 mg Daily.     • metFORMIN (GLUCOPHAGE) 1000 MG tablet Take 1,000 mg by mouth 2 (Two) Times a Day With Meals.     • multivitamin with minerals (MULTIVITAMIN ADULTS PO) Take 1 tablet by mouth Daily.     • pantoprazole (PROTONIX) 40 MG EC tablet Take 40 mg by mouth.     • traMADol (ULTRAM) 50 MG tablet 1-2 tablets bid prn 120 tablet 2     No current facility-administered medications for this visit.     Review of Systems   Constitutional: Negative.    Skin:        Painful toenails   Neurological: Positive for numbness.   All other systems reviewed and are negative.      OBJECTIVE     Vitals:    01/25/22 1354   BP: 147/61   Pulse: 84   Temp: 97.3 °F (36.3 °C)   SpO2: 98%       Lab Results   Component Value Date    HGBA1C 8.76 (H) 01/14/2022       Lab Results   Component Value Date    GLUCOSE 205 (H) 01/14/2022    CALCIUM 10.0 01/14/2022     01/14/2022 "    K 5.0 01/14/2022    CO2 26.5 01/14/2022     01/14/2022    BUN 15 01/14/2022    CREATININE 1.07 01/14/2022    EGFRIFNONA 69 01/14/2022    BCR 14.0 01/14/2022    ANIONGAP 10.5 01/14/2022       Patient seen in no apparent distress.      PHYSICAL EXAM:     Foot/Ankle Exam:       General:   Appearance: elderly    Orientation: AAOx3    Affect: appropriate    Gait: unimpaired    Shoe Gear:  Casual shoes    VASCULAR      Right Foot Vascularity   Normal vascular exam    Dorsalis pedis:  1+  Posterior tibial:  1+  Skin Temperature: cool    Edema Grading:  None  CFT:  < 3 seconds  Pedal Hair Growth:  Present  Varicosities: none       Left Foot Vascularity   Normal vascular exam    Dorsalis pedis:  1+  Posterior tibial:  1+  Skin Temperature: cool    Edema Grading:  None  CFT:  < 3 seconds  Pedal Hair Growth:  Present  Varicosities: none        NEUROLOGIC     Right Foot Neurologic   Light touch sensation:  Diminished  Vibratory sensation:  Diminished  Hot/Cold sensation: diminished    Protective Sensation using [place]-Keiko Monofilament:  2     Left Foot Neurologic   Light touch sensation:  Diminished  Vibratory sensation:  Diminished  Hot/cold sensation: diminished    Protective Sensation using [place]view-Keiko Monofilament:  2     MUSCLE STRENGTH     Right Foot Muscle Strength   Normal strength    Foot dorsiflexion:  5  Foot plantar flexion:  5  Foot inversion:  5  Foot eversion:  5     Left Foot Muscle Strength   Normal strength    Foot dorsiflexion:  5  Foot plantar flexion:  5  Foot inversion:  5  Foot eversion:  5     RANGE OF MOTION      Right Foot Range of Motion   Foot and ankle ROM within normal limits       Left Foot Range of Motion   Foot and ankle ROM within normal limits       DERMATOLOGIC     Right Foot Dermatologic   Skin: skin intact    Nails: onychomycosis, abnormally thick, subungual debris and dystrophic nails    Nails comment:  Toenails 1, 2, 3, 4, and 5     Left Foot Dermatologic   Skin: skin  intact    Nails: onychomycosis, abnormally thick, subungual debris, dystrophic nails and ingrown toenail    Nails comment:  Toenails 1, 2, 3, 4, and 5      Diabetic Foot Exam Performed    ASSESSMENT/PLAN     Diagnoses and all orders for this visit:    1. Foot pain, bilateral (Primary)    2. Onychomycosis    3. Onychocryptosis    4. Diabetic foot (HCC)    5. Type 2 diabetes mellitus with diabetic polyneuropathy, with long-term current use of insulin (HCC)    6. Neuropathy        Comprehensive lower extremity examination and evaluation was performed.    Discussed findings and treatment plan including risks, benefits, and treatment options with patient in detail. Patient agreed with treatment plan.    Toenails 1 through 5 bilaterally were debrided in thickness and length and then smoothed with a Dremel Tool.  Tolerated the procedure well without complications.    Diabetic foot exam performed and documented this date, compliant with CQM required standards. Detail of findings as noted in physical exam.  Lower extremity Neurologic exam for diabetic patient performed and documented this date, compliant with PQRS required standards. Detail of findings as noted in physical exam.  Advised patient importance of good routine lower extremity hygiene. Advised patient importance of evaluating for intact skin and pain free nail borders.  Advised patient to use mirror to evaluate plantar/ soles of feet for better visualization. Advised patient monitor and phone office to be seen if any cracking to skin, open lesions, painful nail borders or if nails become elongated prior to next visit. Advised patient importance of daily cleansing of lower extremities, followed by good skin cream to maintain normal hydration of skin. Also advised patient importance of close daily monitoring of blood sugar. Advised to regulate diet and medications to maintain control of blood sugar in optimal range. Contact primary care provider if difficulties  maintaining blood sugar levels.  Advised Patient of presence of Diabetes Mellitus condition.  Advised Patient risk of progression and worsening or improvement, then return of condition.  Will monitor condition for any change in future. Treat with most appropriate treatment pending status of condition.  Counseled and advised patient extensively on nature and ramifications of diabetes. Standard instructions given to patient for good diabetic foot care and maintenance. Advised importance of careful monitoring to avoid break down and complications secondary to diabetes. Advised patient importance of strict maintenance of blood sugar control. Advised patient of possible ominous results from neglect of condition, i.e.: amputation/ loss of digits, feet and legs, or even death.  Patient states understands counseling, will monitor closely, continue good hygiene and routine diabetic foot care. Patient will contact office is questions or problems.      An After Visit Summary was printed and given to the patient at discharge, including (if requested) any available informative/educational handouts regarding diagnosis, treatment, or medications. All questions were answered to patient/family satisfaction. Should symptoms fail to improve or worsen they agree to call or return to clinic or to go to the Emergency Department. Discussed the importance of following up with any needed screening tests/labs/specialist appointments and any requested follow-up recommended by me today. Importance of maintaining follow-up discussed and patient accepts that missed appointments can delay diagnosis and potentially lead to worsening of conditions.    Return in about 9 weeks (around 3/29/2022) for Toenail Care., or sooner if acute issues arise.    This document has been electronically signed by Wayne Foster DPM on January 25, 2022 14:09 EST

## 2022-02-28 ENCOUNTER — TELEPHONE (OUTPATIENT)
Dept: INTERNAL MEDICINE | Facility: CLINIC | Age: 72
End: 2022-02-28

## 2022-02-28 DIAGNOSIS — F19.20 CHRONIC PAIN WITH DRUG DEPENDENCE: Primary | ICD-10-CM

## 2022-02-28 DIAGNOSIS — G89.29 CHRONIC PAIN WITH DRUG DEPENDENCE: Primary | ICD-10-CM

## 2022-02-28 RX ORDER — DOCUSATE SODIUM 100 MG/1
100 CAPSULE, LIQUID FILLED ORAL 3 TIMES DAILY
Qty: 270 CAPSULE | Refills: 1 | Status: SHIPPED | OUTPATIENT
Start: 2022-02-28 | End: 2022-07-25 | Stop reason: SDUPTHER

## 2022-02-28 RX ORDER — ATORVASTATIN CALCIUM 40 MG/1
40 TABLET, FILM COATED ORAL DAILY
Qty: 90 TABLET | Refills: 1 | Status: SHIPPED | OUTPATIENT
Start: 2022-02-28 | End: 2022-07-25 | Stop reason: SDUPTHER

## 2022-02-28 RX ORDER — GABAPENTIN 600 MG/1
600 TABLET ORAL 3 TIMES DAILY
Qty: 270 TABLET | Refills: 0 | Status: SHIPPED | OUTPATIENT
Start: 2022-02-28 | End: 2022-03-02 | Stop reason: SDUPTHER

## 2022-02-28 RX ORDER — ASPIRIN 81 MG/1
81 TABLET ORAL DAILY
Qty: 90 TABLET | Refills: 1 | Status: SHIPPED | OUTPATIENT
Start: 2022-02-28 | End: 2022-07-25 | Stop reason: SDUPTHER

## 2022-02-28 NOTE — TELEPHONE ENCOUNTER
DELETE AFTER REVIEWING: Send the encounter HIGH priority, If patient has less than a 3 day supply. If the patient will run out of medication over the weekend add that information to the additional details line. Send this encounter to the clinical pool.    Caller: Norberto Whiteside    Relationship: Self    Best call back number: [phone]    Requested Prescriptions:   Requested Prescriptions     Pending Prescriptions Disp Refills   • atorvastatin (LIPITOR) 40 MG tablet       Sig: Take 1 tablet by mouth Daily.   • gabapentin (NEURONTIN) 600 MG tablet       Sig: Take 1 tablet by mouth 3 (Three) Times a Day.   • docusate sodium (COLACE) 100 MG capsule       Sig: Take 1 capsule by mouth 3 (Three) Times a Day.   • aspirin 81 MG EC tablet       Sig: Take 1 tablet by mouth Daily.   PRECISION XTRA GLUC TEST STRIPS 100 PER BOX TESTS 3 TIMES A DAY    Pharmacy where request should be sent: [facility] - [phone]  - [phone] FX     Additional details provided by patient: PATIENT WILL COME IN AND  A WRITTEN SCRIPT FOR THE GABAPENTIN. PLEASE REFILL FOR 90 DAYS HIS PRESCRIPTIONS WITH 3 REFILLS AVAILABLE.   PATIENT HAS 3 DAY SUPPLY. HE WILL  THE WRITTEN SCRIPT Wednesday MORNING.  Does the patient have less than a 3 day supply:  [] Yes  [x] No    Roni Montesinos Rep   02/28/22 10:11 EST

## 2022-03-02 DIAGNOSIS — G89.29 CHRONIC PAIN WITH DRUG DEPENDENCE: ICD-10-CM

## 2022-03-02 DIAGNOSIS — F19.20 CHRONIC PAIN WITH DRUG DEPENDENCE: ICD-10-CM

## 2022-03-02 RX ORDER — GABAPENTIN 600 MG/1
600 TABLET ORAL 3 TIMES DAILY
Qty: 270 TABLET | Refills: 1 | Status: SHIPPED | OUTPATIENT
Start: 2022-03-02 | End: 2022-07-25 | Stop reason: SDUPTHER

## 2022-03-11 ENCOUNTER — TELEPHONE (OUTPATIENT)
Dept: INTERNAL MEDICINE | Facility: CLINIC | Age: 72
End: 2022-03-11

## 2022-03-11 RX ORDER — EXENATIDE 2 MG/.65ML
INJECTION, SUSPENSION, EXTENDED RELEASE SUBCUTANEOUS
Qty: 3 PEN | Refills: 1 | Status: SHIPPED | OUTPATIENT
Start: 2022-03-11 | End: 2022-07-25 | Stop reason: SDUPTHER

## 2022-03-11 RX ORDER — PANTOPRAZOLE SODIUM 40 MG/1
40 TABLET, DELAYED RELEASE ORAL DAILY
Qty: 90 TABLET | Refills: 1 | Status: SHIPPED | OUTPATIENT
Start: 2022-03-11 | End: 2022-07-25 | Stop reason: SDUPTHER

## 2022-03-11 RX ORDER — LORATADINE 10 MG/1
10 TABLET ORAL DAILY
Qty: 90 TABLET | Refills: 1 | Status: SHIPPED | OUTPATIENT
Start: 2022-03-11 | End: 2022-07-25 | Stop reason: SDUPTHER

## 2022-04-19 ENCOUNTER — OFFICE VISIT (OUTPATIENT)
Dept: PODIATRY | Facility: CLINIC | Age: 72
End: 2022-04-19

## 2022-04-19 VITALS
DIASTOLIC BLOOD PRESSURE: 66 MMHG | BODY MASS INDEX: 34.81 KG/M2 | HEIGHT: 66 IN | WEIGHT: 216.6 LBS | OXYGEN SATURATION: 98 % | TEMPERATURE: 97.1 F | HEART RATE: 91 BPM | SYSTOLIC BLOOD PRESSURE: 116 MMHG

## 2022-04-19 DIAGNOSIS — G62.9 NEUROPATHY: ICD-10-CM

## 2022-04-19 DIAGNOSIS — B35.1 ONYCHOMYCOSIS: ICD-10-CM

## 2022-04-19 DIAGNOSIS — M79.671 FOOT PAIN, BILATERAL: ICD-10-CM

## 2022-04-19 DIAGNOSIS — M79.672 FOOT PAIN, BILATERAL: ICD-10-CM

## 2022-04-19 DIAGNOSIS — E11.42 TYPE 2 DIABETES MELLITUS WITH DIABETIC POLYNEUROPATHY, WITH LONG-TERM CURRENT USE OF INSULIN: Primary | ICD-10-CM

## 2022-04-19 DIAGNOSIS — E11.8 DIABETIC FOOT: ICD-10-CM

## 2022-04-19 DIAGNOSIS — L60.0 ONYCHOCRYPTOSIS: ICD-10-CM

## 2022-04-19 DIAGNOSIS — Z79.4 TYPE 2 DIABETES MELLITUS WITH DIABETIC POLYNEUROPATHY, WITH LONG-TERM CURRENT USE OF INSULIN: Primary | ICD-10-CM

## 2022-04-19 PROCEDURE — G8404 LOW EXTEMITY NEUR EXAM DOCUM: HCPCS | Performed by: PODIATRIST

## 2022-04-19 PROCEDURE — 11721 DEBRIDE NAIL 6 OR MORE: CPT | Performed by: PODIATRIST

## 2022-04-19 NOTE — PROGRESS NOTES
[facility]    Today's Date: 22    Patient Name: Norberto Whiteside  MRN: [id number]  CSN: [id number]  PCP: Moreno Pereira MD, Last PCP Visit: 3/11/2022  Referring Provider: No ref. provider found    SUBJECTIVE     Chief Complaint   Patient presents with   • Left Foot - Nail Problem   • Right Foot - Nail Problem     HPI: Norberto Whiteside, a 71 y.o.male, comes to clinic.    New, Established, New Problem:  est    Location:  Toenails    Duration:   Greater than five years    Onset:  Gradual    Nature:  sore with palpation.    Stable, worsening, improving:   Stable    Aggravating factors:  Pain with shoe gear and ambulation.    Previous Treatment:  debridement    Patient reported last blood glucose: 162    Patient relates no medical changes since their last visit.    Patient denies any fevers, chills, nausea, vomiting, shortness of breathe, nor any other constitutional signs nor symptoms.       Past Medical History:   Diagnosis Date   • Allergic     Seasonal   • Allergies    • Callus    • Cancer (HCC)     skin cancer   • Diabetes mellitus (HCC)    • Hard of hearing    • Hypertension    • Sciatica    • Sleep apnea      Past Surgical History:   Procedure Laterality Date   • ANGIOPLASTY     • COLONOSCOPY N/A 2022    Procedure: COLONOSCOPY with polypectomies;  Surgeon: Kanu Meade MD;  Location: [facility] ENDOSCOPY;  Service: General;  Laterality: N/A;  colon polyps     Family History   Problem Relation Age of Onset   • Stroke Mother    • Cancer Mother    • Cancer Father    • Cancer Sister    • Malig Hyperthermia Neg Hx      Social History     Socioeconomic History   • Marital status:    Tobacco Use   • Smoking status: Former Smoker     Types: Cigarettes     Quit date: 3/1/1996     Years since quittin.1   • Smokeless tobacco: Never Used   Vaping Use   • Vaping Use: Never used   Substance and Sexual Activity   • Alcohol use: Yes     Comment: rare   • Drug use: Never   • Sexual  "activity: Defer     No Known Allergies  Current Outpatient Medications   Medication Sig Dispense Refill   • aspirin 81 MG EC tablet Take 1 tablet by mouth Daily. 90 tablet 1   • atenolol (TENORMIN) 50 MG tablet Take 1 tablet by mouth Daily. 90 tablet 1   • atorvastatin (LIPITOR) 40 MG tablet Take 1 tablet by mouth Daily. 90 tablet 1   • docusate sodium (COLACE) 100 MG capsule Take 1 capsule by mouth 3 (Three) Times a Day. 270 capsule 1   • empagliflozin (Jardiance) 25 MG tablet tablet Take 1 tablet by mouth Daily. 90 tablet 1   • exenatide er (Bydureon) 2 MG pen-injector injection Once a week 3 pen 1   • gabapentin (NEURONTIN) 600 MG tablet Take 1 tablet by mouth 3 (Three) Times a Day. 270 tablet 1   • glucose blood test strip 1 each by Other route 2 (Two) Times a Day. Use as instructed 200 each 3   • insulin glargine (LANTUS, SEMGLEE) 100 UNIT/ML injection Inject 10 Units under the skin into the appropriate area as directed.     • Insulin Pen Needle (Pen Needles 3/16\") 31G X 5 MM misc 1 kit 2 (two) times a day. 180 each 0   • Lancets misc 1 kit 2 (two) times a day. 180 each 0   • lisinopril-hydrochlorothiazide (PRINZIDE,ZESTORETIC) 20-12.5 MG per tablet Take 1 tablet by mouth Daily. 90 tablet 1   • loratadine (CLARITIN) 10 MG tablet Take 1 tablet by mouth Daily. 90 tablet 1   • metFORMIN (GLUCOPHAGE) 1000 MG tablet Take 1 tablet by mouth 2 (Two) Times a Day With Meals. 180 tablet 1   • multivitamin with minerals tablet tablet Take 1 tablet by mouth Daily.     • pantoprazole (PROTONIX) 40 MG EC tablet Take 1 tablet by mouth Daily. 90 tablet 1   • traMADol (ULTRAM) 50 MG tablet 1-2 tablets bid prn 120 tablet 2     No current facility-administered medications for this visit.     Review of Systems   Constitutional: Negative.    Skin:        Painful toenails   Neurological: Positive for numbness.   All other systems reviewed and are negative.      OBJECTIVE     Vitals:    04/19/22 1247   BP: 116/66   Pulse: 91   Temp: " 97.1 °F (36.2 °C)   SpO2: 98%       Lab Results   Component Value Date    HGBA1C 8.76 (H) 01/14/2022       Lab Results   Component Value Date    GLUCOSE 205 (H) 01/14/2022    CALCIUM 10.0 01/14/2022     01/14/2022    K 5.0 01/14/2022    CO2 26.5 01/14/2022     01/14/2022    BUN 15 01/14/2022    CREATININE 1.07 01/14/2022    EGFRIFNONA 69 01/14/2022    BCR 14.0 01/14/2022    ANIONGAP 10.5 01/14/2022       Patient seen in no apparent distress.      PHYSICAL EXAM:     Foot/Ankle Exam:       General:   Appearance: elderly    Orientation: AAOx3    Affect: appropriate    Gait: unimpaired    Shoe Gear:  Casual shoes    VASCULAR      Right Foot Vascularity   Normal vascular exam    Dorsalis pedis:  1+  Posterior tibial:  1+  Skin Temperature: cool    Edema Grading:  None  CFT:  < 3 seconds  Pedal Hair Growth:  Present  Varicosities: none       Left Foot Vascularity   Normal vascular exam    Dorsalis pedis:  1+  Posterior tibial:  1+  Skin Temperature: cool    Edema Grading:  None  CFT:  < 3 seconds  Pedal Hair Growth:  Present  Varicosities: none        NEUROLOGIC     Right Foot Neurologic   Light touch sensation:  Diminished  Vibratory sensation:  Diminished  Hot/Cold sensation: diminished    Protective Sensation using Cutler-Keiko Monofilament:  2     Left Foot Neurologic   Light touch sensation:  Diminished  Vibratory sensation:  Diminished  Hot/cold sensation: diminished    Protective Sensation using Cutler-Keiko Monofilament:  2     MUSCLE STRENGTH     Right Foot Muscle Strength   Normal strength    Foot dorsiflexion:  5  Foot plantar flexion:  5  Foot inversion:  5  Foot eversion:  5     Left Foot Muscle Strength   Normal strength    Foot dorsiflexion:  5  Foot plantar flexion:  5  Foot inversion:  5  Foot eversion:  5     RANGE OF MOTION      Right Foot Range of Motion   Foot and ankle ROM within normal limits       Left Foot Range of Motion   Foot and ankle ROM within normal limits        DERMATOLOGIC     Right Foot Dermatologic   Skin: skin intact    Nails: onychomycosis, abnormally thick, subungual debris and dystrophic nails    Nails comment:  Toenails 1, 2, 3, 4, and 5     Left Foot Dermatologic   Skin: skin intact    Nails: onychomycosis, abnormally thick, subungual debris, dystrophic nails and ingrown toenail    Nails comment:  Toenails 1, 2, 3, 4, and 5      Diabetic Foot Exam Performed    ASSESSMENT/PLAN     Diagnoses and all orders for this visit:    1. Type 2 diabetes mellitus with diabetic polyneuropathy, with long-term current use of insulin (HCC) (Primary)    2. Onychocryptosis    3. Foot pain, bilateral    4. Neuropathy    5. Diabetic foot (HCC)    6. Onychomycosis        Comprehensive lower extremity examination and evaluation was performed.    Discussed findings and treatment plan including risks, benefits, and treatment options with patient in detail. Patient agreed with treatment plan.    Toenails 1 through 5 bilaterally were debrided in thickness and length and then smoothed with a Dremel Tool.  Tolerated the procedure well without complications.    Diabetic foot exam performed and documented this date, compliant with CQM required standards. Detail of findings as noted in physical exam.  Lower extremity Neurologic exam for diabetic patient performed and documented this date, compliant with PQRS required standards. Detail of findings as noted in physical exam.  Advised patient importance of good routine lower extremity hygiene. Advised patient importance of evaluating for intact skin and pain free nail borders.  Advised patient to use mirror to evaluate plantar/ soles of feet for better visualization. Advised patient monitor and phone office to be seen if any cracking to skin, open lesions, painful nail borders or if nails become elongated prior to next visit. Advised patient importance of daily cleansing of lower extremities, followed by good skin cream to maintain normal hydration  of skin. Also advised patient importance of close daily monitoring of blood sugar. Advised to regulate diet and medications to maintain control of blood sugar in optimal range. Contact primary care provider if difficulties maintaining blood sugar levels.  Advised Patient of presence of Diabetes Mellitus condition.  Advised Patient risk of progression and worsening or improvement, then return of condition.  Will monitor condition for any change in future. Treat with most appropriate treatment pending status of condition.  Counseled and advised patient extensively on nature and ramifications of diabetes. Standard instructions given to patient for good diabetic foot care and maintenance. Advised importance of careful monitoring to avoid break down and complications secondary to diabetes. Advised patient importance of strict maintenance of blood sugar control. Advised patient of possible ominous results from neglect of condition, i.e.: amputation/ loss of digits, feet and legs, or even death.  Patient states understands counseling, will monitor closely, continue good hygiene and routine diabetic foot care. Patient will contact office is questions or problems.      An After Visit Summary was printed and given to the patient at discharge, including (if requested) any available informative/educational handouts regarding diagnosis, treatment, or medications. All questions were answered to patient/family satisfaction. Should symptoms fail to improve or worsen they agree to call or return to clinic or to go to the Emergency Department. Discussed the importance of following up with any needed screening tests/labs/specialist appointments and any requested follow-up recommended by me today. Importance of maintaining follow-up discussed and patient accepts that missed appointments can delay diagnosis and potentially lead to worsening of conditions.    Return in about 9 weeks (around 6/21/2022) for Toenail Care., or sooner if  acute issues arise.    This document has been electronically signed by Wayne Foster DPM on April 19, 2022 13:18 EDT

## 2022-04-20 ENCOUNTER — LAB (OUTPATIENT)
Dept: LAB | Facility: HOSPITAL | Age: 72
End: 2022-04-20

## 2022-04-20 DIAGNOSIS — Z79.4 TYPE 2 DIABETES MELLITUS WITH HYPERGLYCEMIA, WITH LONG-TERM CURRENT USE OF INSULIN: ICD-10-CM

## 2022-04-20 DIAGNOSIS — E11.65 TYPE 2 DIABETES MELLITUS WITH HYPERGLYCEMIA, WITH LONG-TERM CURRENT USE OF INSULIN: ICD-10-CM

## 2022-04-20 LAB
ANION GAP SERPL CALCULATED.3IONS-SCNC: 12.4 MMOL/L (ref 5–15)
BUN SERPL-MCNC: 20 MG/DL (ref 8–23)
BUN/CREAT SERPL: 18.2 (ref 7–25)
CALCIUM SPEC-SCNC: 9.6 MG/DL (ref 8.6–10.5)
CHLORIDE SERPL-SCNC: 102 MMOL/L (ref 98–107)
CO2 SERPL-SCNC: 25.6 MMOL/L (ref 22–29)
CREAT SERPL-MCNC: 1.1 MG/DL (ref 0.76–1.27)
EGFRCR SERPLBLD CKD-EPI 2021: 72.7 ML/MIN/1.73
GLUCOSE SERPL-MCNC: 142 MG/DL (ref 65–99)
HBA1C MFR BLD: 8.1 % (ref 4.8–5.6)
POTASSIUM SERPL-SCNC: 4.7 MMOL/L (ref 3.5–5.2)
SODIUM SERPL-SCNC: 140 MMOL/L (ref 136–145)

## 2022-04-20 PROCEDURE — 36415 COLL VENOUS BLD VENIPUNCTURE: CPT

## 2022-04-20 PROCEDURE — 80048 BASIC METABOLIC PNL TOTAL CA: CPT

## 2022-04-20 PROCEDURE — 83036 HEMOGLOBIN GLYCOSYLATED A1C: CPT

## 2022-04-25 ENCOUNTER — OFFICE VISIT (OUTPATIENT)
Dept: INTERNAL MEDICINE | Facility: CLINIC | Age: 72
End: 2022-04-25

## 2022-04-25 VITALS
SYSTOLIC BLOOD PRESSURE: 111 MMHG | OXYGEN SATURATION: 94 % | HEIGHT: 66 IN | HEART RATE: 97 BPM | DIASTOLIC BLOOD PRESSURE: 77 MMHG | TEMPERATURE: 97.2 F | BODY MASS INDEX: 33.91 KG/M2 | WEIGHT: 211 LBS

## 2022-04-25 DIAGNOSIS — E78.2 MIXED HYPERLIPIDEMIA: Primary | ICD-10-CM

## 2022-04-25 DIAGNOSIS — I10 PRIMARY HYPERTENSION: ICD-10-CM

## 2022-04-25 DIAGNOSIS — G63 POLYNEUROPATHY ASSOCIATED WITH UNDERLYING DISEASE: ICD-10-CM

## 2022-04-25 DIAGNOSIS — Z79.4 TYPE 2 DIABETES MELLITUS WITH HYPERGLYCEMIA, WITH LONG-TERM CURRENT USE OF INSULIN: ICD-10-CM

## 2022-04-25 DIAGNOSIS — E11.65 TYPE 2 DIABETES MELLITUS WITH HYPERGLYCEMIA, WITH LONG-TERM CURRENT USE OF INSULIN: ICD-10-CM

## 2022-04-25 DIAGNOSIS — I25.110 UNSTABLE ANGINA PECTORIS DUE TO CORONARY ARTERIOSCLEROSIS: ICD-10-CM

## 2022-04-25 PROBLEM — K59.00 CONSTIPATION: Status: RESOLVED | Noted: 2021-07-16 | Resolved: 2022-04-25

## 2022-04-25 PROBLEM — G93.32 CHRONIC FATIGUE SYNDROME: Status: RESOLVED | Noted: 2021-10-03 | Resolved: 2022-04-25

## 2022-04-25 PROCEDURE — 99214 OFFICE O/P EST MOD 30 MIN: CPT | Performed by: INTERNAL MEDICINE

## 2022-04-25 NOTE — PATIENT INSTRUCTIONS
1.  Please resume the whole pill of atenolol equals 50 mg daily.    2.  Cut the lisinopril/HCT in half and take 1/2 tablet daily going forward.    3.  Call in about a month with how your blood pressure and pulse are running at home.    4.  Take an extra 300 mg of gabapentin once daily for about a month, then can increase to 600 mg 4 times daily if needed.

## 2022-04-25 NOTE — ASSESSMENT & PLAN NOTE
This is progressing as of his 4/22 office visit.  It is most likely obviously related to his underlying diabetes.  Thyroid function was checked earlier in the year and it was unremarkable.  We will get B12 level on return to office.  Patient is on 603 times a day gabapentin, he will add 300 mg once daily, and work his way up to 600 mg 4 times a day.  He also has tramadol that he is using periodically if it is real severe, but occasionally its not helpful either.

## 2022-04-25 NOTE — ASSESSMENT & PLAN NOTE
Patient with no ischemia as of 4/22 office visit.  He is maintained on aspirin and atenolol for antianginal regimen, continue same.

## 2022-04-25 NOTE — ASSESSMENT & PLAN NOTE
Patient's A1c is a little bit improved, it is down from 8.7 to 8.1 as of his 4/22 office visit.  This is nice to see over the late winter months, and patient is just now starting to be more active.  However we want optimal control given his progression of neuropathy.  Patient is maxed out on by durian, metformin, and Jardiance.  Recommend he gradually titrate his Lantus dose based on his morning sugars, adding a couple units every several days until the morning sugar averages around 110.

## 2022-04-25 NOTE — ASSESSMENT & PLAN NOTE
Blood pressure well controlled as of his 4/22 office visit.  He lowered his Tenormin in half since the last visit since he was having low spells at home.  Blood pressure has been more generous, but certainly not elevated since then, no dizzy spells etc.  Of note though his heart rate has trended up a little bit, so patient will go back to the whole tablet of atenolol and he will take a half a tablet of the lisinopril/HCT now.  Patient to call in about a month in regards to possibly getting a lower dose going forward of lisinopril.

## 2022-04-25 NOTE — PROGRESS NOTES
"Chief Complaint  Follow-up (Neuropathy issues), Hyperlipidemia, Hypertension, Diabetes, and Balance Issues    Subjective          Norberto Whiteside presents to [facility]     History of Present Illness  Patient 71-year-old male with underlying diabetes mellitus, hypertension, hyperlipidemia, resultant coronary artery disease with prior stent, who is coming in 4/22 for routine 3-month follow-up.  We will review his labs and make further recommendations at that time.    Review of Systems   Constitutional: Negative for appetite change, fatigue and fever.   HENT: Negative for congestion and ear pain.    Eyes: Negative for blurred vision.   Respiratory: Negative for cough, chest tightness, shortness of breath and wheezing.    Cardiovascular: Negative for chest pain, palpitations and leg swelling.   Gastrointestinal: Negative for abdominal pain.   Genitourinary: Negative for difficulty urinating, dysuria and hematuria.   Musculoskeletal: Negative for arthralgias and gait problem.   Skin: Negative for skin lesions.   Neurological: Negative for syncope, memory problem and confusion.   Psychiatric/Behavioral: Negative for self-injury and depressed mood.       Objective   Vital Signs:   /77   Pulse 97   Temp 97.2 °F (36.2 °C)   Ht 167.6 cm (66\")   Wt 95.7 kg (211 lb)   SpO2 94%   BMI 34.06 kg/m²           Physical Exam  Vitals and nursing note reviewed.   Constitutional:       General: He is not in acute distress.     Appearance: Normal appearance. He is not toxic-appearing.   HENT:      Head: Atraumatic.      Right Ear: External ear normal.      Left Ear: External ear normal.      Nose: Nose normal.      Mouth/Throat:      Mouth: Mucous membranes are moist.   Eyes:      General:         Right eye: No discharge.         Left eye: No discharge.      Extraocular Movements: Extraocular movements intact.      Pupils: Pupils are equal, round, and reactive to light.   Cardiovascular:      " Rate and Rhythm: Normal rate and regular rhythm.      Pulses: Normal pulses.      Heart sounds: Normal heart sounds. No murmur heard.    No gallop.   Pulmonary:      Effort: Pulmonary effort is normal. No respiratory distress.      Breath sounds: No wheezing, rhonchi or rales.   Abdominal:      General: There is no distension.      Palpations: Abdomen is soft. There is no mass.      Tenderness: There is no abdominal tenderness. There is no guarding.   Musculoskeletal:         General: No swelling or tenderness.      Cervical back: No tenderness.      Right lower leg: No edema.      Left lower leg: No edema.   Skin:     General: Skin is warm and dry.      Findings: No rash.   Neurological:      General: No focal deficit present.      Mental Status: He is alert and oriented to person, place, and time. Mental status is at baseline.      Motor: No weakness.      Gait: Gait normal.   Psychiatric:         Mood and Affect: Mood normal.         Thought Content: Thought content normal.          Result Review :   The following data was reviewed by: Moreno Pereira MD on 10/05/2021:                  Assessment and Plan    Diagnoses and all orders for this visit:    1. Mixed hyperlipidemia (Primary)  Assessment & Plan:  LDL was fine in 1/22, patient to continue moderate dose atorvastatin, labs on return to office in the summer.    Orders:  -     Lipid Panel; Future    2. Unstable angina pectoris due to coronary arteriosclerosis (HCC)  Overview:  CAD s/p PTCA '96  He saw Dr. Russell in 11/21 and he recommends stress imaging etc. only if patient having symptoms.     Assessment & Plan:  Patient with no ischemia as of 4/22 office visit.  He is maintained on aspirin and atenolol for antianginal regimen, continue same.      3. Type 2 diabetes mellitus with hyperglycemia, with long-term current use of insulin (HCC)  Assessment & Plan:  Patient's A1c is a little bit improved, it is down from 8.7 to 8.1 as of his 4/22 office visit.  This  is nice to see over the late winter months, and patient is just now starting to be more active.  However we want optimal control given his progression of neuropathy.  Patient is maxed out on by durian, metformin, and Jardiance.  Recommend he gradually titrate his Lantus dose based on his morning sugars, adding a couple units every several days until the morning sugar averages around 110.    Orders:  -     Hemoglobin A1c; Future    4. Primary hypertension  Assessment & Plan:  Blood pressure well controlled as of his 4/22 office visit.  He lowered his Tenormin in half since the last visit since he was having low spells at home.  Blood pressure has been more generous, but certainly not elevated since then, no dizzy spells etc.  Of note though his heart rate has trended up a little bit, so patient will go back to the whole tablet of atenolol and he will take a half a tablet of the lisinopril/HCT now.  Patient to call in about a month in regards to possibly getting a lower dose going forward of lisinopril.    Orders:  -     Comprehensive Metabolic Panel; Future    5. Polyneuropathy associated with underlying disease (HCC)  Assessment & Plan:  This is progressing as of his 4/22 office visit.  It is most likely obviously related to his underlying diabetes.  Thyroid function was checked earlier in the year and it was unremarkable.  We will get B12 level on return to office.  Patient is on 603 times a day gabapentin, he will add 300 mg once daily, and work his way up to 600 mg 4 times a day.  He also has tramadol that he is using periodically if it is real severe, but occasionally its not helpful either.    Orders:  -     Vitamin B12 anemia; Future  -     Folate anemia; Future  -     Sedimentation Rate; Future  -     Ambulatory Referral to Physical Therapy Evaluate and treat    Other orders  -     Insulin Glargine (LANTUS SOLOSTAR) 100 UNIT/ML injection pen; Inject 30 Units under the skin into the appropriate area as  directed Daily for 90 days.  Dispense: 9 pen; Refill: 1     --  --  OLDER NOTES:  VISIT 6/21:  ANNUAL MEDICARE WELLNESS PHYSICAL 9/17 = reviewed all forms with pt in office; no new concerns raised.  DM = A1C as below and OPTHO=20/20 and saw them in spring '18.  --  DM 2 ('15) and was started on Tanzem 3 mo ago and low dose amaryl was stopped; needs repeat labs, but FBS still in 280 ballpark; last A1C=9.9; will increase Tanzem before add another agent...down 10.8 to 8.5 past 4 months, so no invokana yet...7.6 is great trend, so no changes 1/18...8.5 again so needs jardiance/etc now since this is despite wt down some at 5/18 OV; also, may lose tanzem he tells me...8.2 is w/o any new agents, wt is still trending down, so will wait until after new year to add another if still in the 8's...8.4 and he will find out which one is covered...8.3 and got on Jardiance, so need to max it out as of 5/19 OV...7.9 is ok for now at least...8.1 and he blames it on his diet around holiday time; maxed out on 3 meds; will use lantus if same on RTO...8.3 and I d/w he needs Lantus now=10U qd and titrate...7.4 is nice drop already 9/20---> 7.1 is better 6/21.  (Micro-alb neg 5/20)  --  CAD s/p PTCA '96 with need for SPECT soonish=been too long it sound like; no sxs at least---> needs eval as of 6/21 for dizzy/weak/feels off;   LIPIDS with LDL 14 and TG's 400, ? lab error; will repeat prior to changes...LDL 43 with TG's < 200 is fine...LDL 37/TG's 300 baseline 12/19...LDL 27, so will lower dose now to 40 mg---> 60 is better 1/21  --  HTN remains well controlled and ok to lower to 1/2 tab ACEI/HCT if stays low at home.  ?CKD3 = 52% and will get on RTO in '19... >60%... 40% out of the blue 9/19---> 55% holding 6/21.  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  NEUROPATHY is worse 5/20 and I d/w anodyne is an option; worse when  active; will use PRN ultram.  OBESITY with BMI 37 ballpark (TSH neg 5/18).  --  --  [facility].  COLON 1/12 = 2 polyps = 5 yrs per Dr Meade.  Pneumovax x2 ; Prevnar rec 9/17.  Covid vaccine x3 with Pfizer as of 1/22.  Flu shot for 2021 season given 10/21 office visit.  ( 10/21 after 32 years of marriage and she was 10 yrs older = 81 when she passeed Sun had severe dementia at the end; retired  and then  and retired '14, 1 girl in town)    Follow Up   Return in about 3 months (around 7/25/2022).  Patient was given instructions and counseling regarding his condition or for health maintenance advice. Please see specific information pulled into the AVS if appropriate.

## 2022-04-25 NOTE — ASSESSMENT & PLAN NOTE
LDL was fine in 1/22, patient to continue moderate dose atorvastatin, labs on return to office in the summer.

## 2022-07-12 ENCOUNTER — OFFICE VISIT (OUTPATIENT)
Dept: PODIATRY | Facility: CLINIC | Age: 72
End: 2022-07-12

## 2022-07-12 VITALS
SYSTOLIC BLOOD PRESSURE: 131 MMHG | OXYGEN SATURATION: 96 % | HEART RATE: 88 BPM | WEIGHT: 210 LBS | DIASTOLIC BLOOD PRESSURE: 62 MMHG | BODY MASS INDEX: 33.75 KG/M2 | HEIGHT: 66 IN | TEMPERATURE: 97.1 F

## 2022-07-12 DIAGNOSIS — L60.0 ONYCHOCRYPTOSIS: ICD-10-CM

## 2022-07-12 DIAGNOSIS — E11.8 DIABETIC FOOT: ICD-10-CM

## 2022-07-12 DIAGNOSIS — B35.1 ONYCHOMYCOSIS: ICD-10-CM

## 2022-07-12 DIAGNOSIS — E11.42 TYPE 2 DIABETES MELLITUS WITH DIABETIC POLYNEUROPATHY, WITH LONG-TERM CURRENT USE OF INSULIN: ICD-10-CM

## 2022-07-12 DIAGNOSIS — Z79.4 TYPE 2 DIABETES MELLITUS WITH DIABETIC POLYNEUROPATHY, WITH LONG-TERM CURRENT USE OF INSULIN: ICD-10-CM

## 2022-07-12 DIAGNOSIS — G62.9 NEUROPATHY: Primary | ICD-10-CM

## 2022-07-12 DIAGNOSIS — M79.671 FOOT PAIN, BILATERAL: ICD-10-CM

## 2022-07-12 DIAGNOSIS — M79.672 FOOT PAIN, BILATERAL: ICD-10-CM

## 2022-07-12 PROCEDURE — G8404 LOW EXTEMITY NEUR EXAM DOCUM: HCPCS | Performed by: PODIATRIST

## 2022-07-12 PROCEDURE — 11721 DEBRIDE NAIL 6 OR MORE: CPT | Performed by: PODIATRIST

## 2022-07-12 NOTE — PROGRESS NOTES
[facility]    Today's Date: 22    Patient Name: Norberto Whiteside  MRN: [id number]  CSN: [id number]  PCP: Moreno Pereira MD, Last PCP Visit: 2022  Referring Provider: No ref. provider found    SUBJECTIVE     Chief Complaint   Patient presents with   • Left Foot - Nail Problem   • Right Foot - Nail Problem     HPI: Norberto Whiteside, a 72 y.o.male, comes to clinic.    New, Established, New Problem:  est    Location:  Toenails    Duration:   Greater than five years    Onset:  Gradual    Nature:  sore with palpation.    Stable, worsening, improving:   Stable    Aggravating factors:  Pain with shoe gear and ambulation.    Previous Treatment:  debridement    Patient reported last blood glucose: 110.    Medical changes:  Increase in lantus    Patient denies any fevers, chills, nausea, vomiting, shortness of breathe, nor any other constitutional signs nor symptoms.       Past Medical History:   Diagnosis Date   • Allergic     Seasonal   • Allergies    • Callus    • Cancer (HCC)     skin cancer   • Diabetes mellitus (HCC)    • Hard of hearing    • Hypertension    • Sciatica    • Sleep apnea      Past Surgical History:   Procedure Laterality Date   • ANGIOPLASTY     • COLONOSCOPY N/A 2022    Procedure: COLONOSCOPY with polypectomies;  Surgeon: Kanu Meade MD;  Location: [facility] ENDOSCOPY;  Service: General;  Laterality: N/A;  colon polyps     Family History   Problem Relation Age of Onset   • Stroke Mother    • Cancer Mother    • Cancer Father    • Cancer Sister    • Malig Hyperthermia Neg Hx      Social History     Socioeconomic History   • Marital status:    Tobacco Use   • Smoking status: Former Smoker     Packs/day: 1.50     Years: 15.00     Pack years: 22.50     Types: Cigarettes     Start date: 3/1/1979     Quit date: 3/1/1996     Years since quittin.3   • Smokeless tobacco: Never Used   Vaping Use   • Vaping Use: Never used   Substance and Sexual Activity   •  "Alcohol use: Yes     Comment: rare   • Drug use: Never   • Sexual activity: Defer     No Known Allergies  Current Outpatient Medications   Medication Sig Dispense Refill   • aspirin 81 MG EC tablet Take 1 tablet by mouth Daily. 90 tablet 1   • atenolol (TENORMIN) 50 MG tablet Take 1 tablet by mouth Daily. 90 tablet 1   • atorvastatin (LIPITOR) 40 MG tablet Take 1 tablet by mouth Daily. 90 tablet 1   • docusate sodium (COLACE) 100 MG capsule Take 1 capsule by mouth 3 (Three) Times a Day. 270 capsule 1   • empagliflozin (Jardiance) 25 MG tablet tablet Take 1 tablet by mouth Daily. 90 tablet 1   • exenatide er (Bydureon) 2 MG pen-injector injection Once a week 3 pen 1   • gabapentin (NEURONTIN) 600 MG tablet Take 1 tablet by mouth 3 (Three) Times a Day. 270 tablet 1   • glucose blood test strip 1 each by Other route 2 (Two) Times a Day. Use as instructed 200 each 3   • Insulin Glargine (LANTUS SOLOSTAR) 100 UNIT/ML injection pen Inject 30 Units under the skin into the appropriate area as directed Daily for 90 days. (Patient taking differently: Inject 20 Units under the skin into the appropriate area as directed Daily.) 9 pen 1   • Insulin Pen Needle (Pen Needles 3/16\") 31G X 5 MM misc 1 kit 2 (two) times a day. 180 each 0   • Lancets misc 1 kit 2 (two) times a day. 180 each 0   • lisinopril-hydrochlorothiazide (PRINZIDE,ZESTORETIC) 20-12.5 MG per tablet Take 1 tablet by mouth Daily. 90 tablet 1   • loratadine (CLARITIN) 10 MG tablet Take 1 tablet by mouth Daily. 90 tablet 1   • metFORMIN (GLUCOPHAGE) 1000 MG tablet Take 1 tablet by mouth 2 (Two) Times a Day With Meals. 180 tablet 1   • multivitamin with minerals tablet tablet Take 1 tablet by mouth Daily.     • pantoprazole (PROTONIX) 40 MG EC tablet Take 1 tablet by mouth Daily. 90 tablet 1   • traMADol (ULTRAM) 50 MG tablet 1-2 tablets bid prn 120 tablet 2     No current facility-administered medications for this visit.     Review of Systems   Constitutional: " Negative.    Skin:        Painful toenails   Neurological: Positive for numbness.   All other systems reviewed and are negative.      OBJECTIVE     Vitals:    07/12/22 1256   BP: 131/62   Pulse: 88   Temp: 97.1 °F (36.2 °C)   SpO2: 96%       Lab Results   Component Value Date    HGBA1C 8.10 (H) 04/20/2022       Lab Results   Component Value Date    GLUCOSE 142 (H) 04/20/2022    CALCIUM 9.6 04/20/2022     04/20/2022    K 4.7 04/20/2022    CO2 25.6 04/20/2022     04/20/2022    BUN 20 04/20/2022    CREATININE 1.10 04/20/2022    EGFRIFNONA 69 01/14/2022    BCR 18.2 04/20/2022    ANIONGAP 12.4 04/20/2022       Patient seen in no apparent distress.      PHYSICAL EXAM:     Foot/Ankle Exam:       General:   Appearance: elderly    Orientation: AAOx3    Affect: appropriate    Gait: unimpaired    Shoe Gear:  Casual shoes    VASCULAR      Right Foot Vascularity   Normal vascular exam    Dorsalis pedis:  1+  Posterior tibial:  1+  Skin Temperature: cool    Edema Grading:  None  CFT:  < 3 seconds  Pedal Hair Growth:  Present  Varicosities: none       Left Foot Vascularity   Normal vascular exam    Dorsalis pedis:  1+  Posterior tibial:  1+  Skin Temperature: cool    Edema Grading:  None  CFT:  < 3 seconds  Pedal Hair Growth:  Present  Varicosities: none        NEUROLOGIC     Right Foot Neurologic   Light touch sensation:  Diminished  Vibratory sensation:  Diminished  Hot/Cold sensation: diminished    Protective Sensation using Hosmer-Keiko Monofilament:  2     Left Foot Neurologic   Light touch sensation:  Diminished  Vibratory sensation:  Diminished  Hot/cold sensation: diminished    Protective Sensation using Hosmer-Keiko Monofilament:  2     MUSCLE STRENGTH     Right Foot Muscle Strength   Foot dorsiflexion:  4+  Foot plantar flexion:  4+  Foot inversion:  4+  Foot eversion:  4+     Left Foot Muscle Strength   Foot dorsiflexion:  4+  Foot plantar flexion:  4+  Foot inversion:  4+  Foot eversion:  4+      RANGE OF MOTION      Right Foot Range of Motion   Foot and ankle ROM within normal limits       Left Foot Range of Motion   Foot and ankle ROM within normal limits       DERMATOLOGIC     Right Foot Dermatologic   Skin: skin intact    Nails: onychomycosis, abnormally thick, subungual debris and dystrophic nails    Nails comment:  Toenails 1, 2, 3, 4, and 5     Left Foot Dermatologic   Skin: skin intact    Nails: onychomycosis, abnormally thick, subungual debris, dystrophic nails and ingrown toenail    Nails comment:  Toenails 1, 2, 3, 4, and 5      Diabetic Foot Exam Performed    ASSESSMENT/PLAN     Diagnoses and all orders for this visit:    1. Neuropathy (Primary)    2. Diabetic foot (HCC)    3. Type 2 diabetes mellitus with diabetic polyneuropathy, with long-term current use of insulin (HCC)    4. Onychocryptosis    5. Foot pain, bilateral    6. Onychomycosis        Comprehensive lower extremity examination and evaluation was performed.    Discussed findings and treatment plan including risks, benefits, and treatment options with patient in detail. Patient agreed with treatment plan.    Toenails 1 through 5 bilaterally were debrided in thickness and length and then smoothed with a Dremel Tool.  Tolerated the procedure well without complications.    Diabetic foot exam performed and documented this date, compliant with CQM required standards. Detail of findings as noted in physical exam.  Lower extremity Neurologic exam for diabetic patient performed and documented this date, compliant with PQRS required standards. Detail of findings as noted in physical exam.  Advised patient importance of good routine lower extremity hygiene. Advised patient importance of evaluating for intact skin and pain free nail borders.  Advised patient to use mirror to evaluate plantar/ soles of feet for better visualization. Advised patient monitor and phone office to be seen if any cracking to skin, open lesions, painful nail borders or  if nails become elongated prior to next visit. Advised patient importance of daily cleansing of lower extremities, followed by good skin cream to maintain normal hydration of skin. Also advised patient importance of close daily monitoring of blood sugar. Advised to regulate diet and medications to maintain control of blood sugar in optimal range. Contact primary care provider if difficulties maintaining blood sugar levels.  Advised Patient of presence of Diabetes Mellitus condition.  Advised Patient risk of progression and worsening or improvement, then return of condition.  Will monitor condition for any change in future. Treat with most appropriate treatment pending status of condition.  Counseled and advised patient extensively on nature and ramifications of diabetes. Standard instructions given to patient for good diabetic foot care and maintenance. Advised importance of careful monitoring to avoid break down and complications secondary to diabetes. Advised patient importance of strict maintenance of blood sugar control. Advised patient of possible ominous results from neglect of condition, i.e.: amputation/ loss of digits, feet and legs, or even death.  Patient states understands counseling, will monitor closely, continue good hygiene and routine diabetic foot care. Patient will contact office is questions or problems.      An After Visit Summary was printed and given to the patient at discharge, including (if requested) any available informative/educational handouts regarding diagnosis, treatment, or medications. All questions were answered to patient/family satisfaction. Should symptoms fail to improve or worsen they agree to call or return to clinic or to go to the Emergency Department. Discussed the importance of following up with any needed screening tests/labs/specialist appointments and any requested follow-up recommended by me today. Importance of maintaining follow-up discussed and patient accepts that  missed appointments can delay diagnosis and potentially lead to worsening of conditions.    Return in about 9 weeks (around 9/13/2022) for Toenail Care., or sooner if acute issues arise.    This document has been electronically signed by Wayne Foster DPM on July 12, 2022 13:27 EDT

## 2022-07-20 ENCOUNTER — LAB (OUTPATIENT)
Dept: LAB | Facility: HOSPITAL | Age: 72
End: 2022-07-20

## 2022-07-20 DIAGNOSIS — E78.2 MIXED HYPERLIPIDEMIA: ICD-10-CM

## 2022-07-20 DIAGNOSIS — Z79.4 TYPE 2 DIABETES MELLITUS WITH HYPERGLYCEMIA, WITH LONG-TERM CURRENT USE OF INSULIN: ICD-10-CM

## 2022-07-20 DIAGNOSIS — I10 PRIMARY HYPERTENSION: ICD-10-CM

## 2022-07-20 DIAGNOSIS — G63 POLYNEUROPATHY ASSOCIATED WITH UNDERLYING DISEASE: ICD-10-CM

## 2022-07-20 DIAGNOSIS — E11.65 TYPE 2 DIABETES MELLITUS WITH HYPERGLYCEMIA, WITH LONG-TERM CURRENT USE OF INSULIN: ICD-10-CM

## 2022-07-20 LAB
ALBUMIN SERPL-MCNC: 4.3 G/DL (ref 3.5–5.2)
ALBUMIN/GLOB SERPL: 1.7 G/DL
ALP SERPL-CCNC: 69 U/L (ref 39–117)
ALT SERPL W P-5'-P-CCNC: 16 U/L (ref 1–41)
ANION GAP SERPL CALCULATED.3IONS-SCNC: 13.5 MMOL/L (ref 5–15)
AST SERPL-CCNC: 20 U/L (ref 1–40)
BILIRUB SERPL-MCNC: 0.3 MG/DL (ref 0–1.2)
BUN SERPL-MCNC: 17 MG/DL (ref 8–23)
BUN/CREAT SERPL: 17.3 (ref 7–25)
CALCIUM SPEC-SCNC: 9.6 MG/DL (ref 8.6–10.5)
CHLORIDE SERPL-SCNC: 104 MMOL/L (ref 98–107)
CHOLEST SERPL-MCNC: 116 MG/DL (ref 0–200)
CO2 SERPL-SCNC: 24.5 MMOL/L (ref 22–29)
CREAT SERPL-MCNC: 0.98 MG/DL (ref 0.76–1.27)
EGFRCR SERPLBLD CKD-EPI 2021: 83 ML/MIN/1.73
ERYTHROCYTE [SEDIMENTATION RATE] IN BLOOD: 15 MM/HR (ref 0–20)
FOLATE SERPL-MCNC: >20 NG/ML (ref 4.78–24.2)
GLOBULIN UR ELPH-MCNC: 2.6 GM/DL
GLUCOSE SERPL-MCNC: 97 MG/DL (ref 65–99)
HBA1C MFR BLD: 6.3 % (ref 4.8–5.6)
HDLC SERPL-MCNC: 31 MG/DL (ref 40–60)
LDLC SERPL CALC-MCNC: 56 MG/DL (ref 0–100)
LDLC/HDLC SERPL: 1.65 {RATIO}
POTASSIUM SERPL-SCNC: 4.6 MMOL/L (ref 3.5–5.2)
PROT SERPL-MCNC: 6.9 G/DL (ref 6–8.5)
SODIUM SERPL-SCNC: 142 MMOL/L (ref 136–145)
TRIGL SERPL-MCNC: 169 MG/DL (ref 0–150)
VIT B12 BLD-MCNC: 489 PG/ML (ref 211–946)
VLDLC SERPL-MCNC: 29 MG/DL (ref 5–40)

## 2022-07-20 PROCEDURE — 82607 VITAMIN B-12: CPT

## 2022-07-20 PROCEDURE — 80061 LIPID PANEL: CPT

## 2022-07-20 PROCEDURE — 85652 RBC SED RATE AUTOMATED: CPT

## 2022-07-20 PROCEDURE — 83036 HEMOGLOBIN GLYCOSYLATED A1C: CPT

## 2022-07-20 PROCEDURE — 80053 COMPREHEN METABOLIC PANEL: CPT

## 2022-07-20 PROCEDURE — 82746 ASSAY OF FOLIC ACID SERUM: CPT

## 2022-07-20 PROCEDURE — 36415 COLL VENOUS BLD VENIPUNCTURE: CPT

## 2022-07-21 NOTE — PROGRESS NOTES
"Chief Complaint  Hyperlipidemia (Pt is here for routine follow up. Pt states that he is still having pain on the right side that comes and goes. Pt's daughter states that he is still having episodes of dizziness, would like to discuss other tests.  Pt's daughter states that he has numbness and tingling in his fingers, says that he can't hardly  items. Pt daughter is concerned about the pain in his upper back/shoulder when he turns his head. )    Subjective          Norberto Whiteside presents to [facility]     History of Present Illness  Patient 72-year-old male with underlying diabetes mellitus, hypertension, hyperlipidemia, resultant coronary artery disease with prior stent, who is coming in 7/22 for routine 3-month follow-up.  We will review his labs and make further recommendations at that time.    Review of Systems   Constitutional: Negative for appetite change, fatigue and fever.   HENT: Negative for congestion and ear pain.    Eyes: Negative for blurred vision.   Respiratory: Negative for cough, chest tightness, shortness of breath and wheezing.    Cardiovascular: Negative for chest pain, palpitations and leg swelling.   Gastrointestinal: Negative for abdominal pain.   Genitourinary: Negative for difficulty urinating, dysuria and hematuria.   Musculoskeletal: Negative for arthralgias and gait problem.   Skin: Negative for skin lesions.   Neurological: Negative for syncope, memory problem and confusion.   Psychiatric/Behavioral: Negative for self-injury and depressed mood.       Objective   Vital Signs:   /77   Pulse 77   Temp 97.5 °F (36.4 °C)   Ht 167.6 cm (65.98\")   Wt 94.3 kg (208 lb)   SpO2 96%   BMI 33.59 kg/m²           Physical Exam  Vitals and nursing note reviewed.   Constitutional:       General: He is not in acute distress.     Appearance: Normal appearance. He is not toxic-appearing.   HENT:      Head: Atraumatic.      Right Ear: External ear " normal.      Left Ear: External ear normal.      Nose: Nose normal.      Mouth/Throat:      Mouth: Mucous membranes are moist.   Eyes:      General:         Right eye: No discharge.         Left eye: No discharge.      Extraocular Movements: Extraocular movements intact.      Pupils: Pupils are equal, round, and reactive to light.   Cardiovascular:      Rate and Rhythm: Normal rate and regular rhythm.      Pulses: Normal pulses.      Heart sounds: Normal heart sounds. No murmur heard.    No gallop.   Pulmonary:      Effort: Pulmonary effort is normal. No respiratory distress.      Breath sounds: No wheezing, rhonchi or rales.   Abdominal:      General: There is no distension.      Palpations: Abdomen is soft. There is no mass.      Tenderness: There is no abdominal tenderness. There is no guarding.   Musculoskeletal:         General: No swelling or tenderness.      Cervical back: No tenderness.      Right lower leg: No edema.      Left lower leg: No edema.   Skin:     General: Skin is warm and dry.      Findings: No rash.   Neurological:      General: No focal deficit present.      Mental Status: He is alert and oriented to person, place, and time. Mental status is at baseline.      Motor: No weakness.      Gait: Gait normal.   Psychiatric:         Mood and Affect: Mood normal.         Thought Content: Thought content normal.          Result Review :   The following data was reviewed by: Moreno Pereira MD on 10/05/2021:                  Assessment and Plan    Diagnoses and all orders for this visit:    1. Mixed hyperlipidemia (Primary)  Assessment & Plan:  LDL is very stable at 56 as of 7/22.  This is easily at goal.  Patient is stable to continue moderate dose atorvastatin.      2. Primary hypertension  Assessment & Plan:  Patient blood pressure stable as of 7/22.  We went back to the full dose of Tenormin last time due to some tachycardia, and heart rate is in the 70s.  Prescription says whole tablet, but for the  time being he is only on a half of the lisinopril/hct, and should continue as such.    Orders:  -     Cancel: Basic Metabolic Panel; Future  -     Basic Metabolic Panel; Future    3. Type 2 diabetes mellitus with hyperglycemia, with long-term current use of insulin ([facility])  Assessment & Plan:  Patient's A1c is down sharply from 8.1 to 6.3 as of his 7/22 office visit.  He has made some significant changes with his diet at home, and he titrated from 10 to 20 units of Lantus as well.  Patient is keeping a close eye on his sugars, he is not have any significant lows, and his mornings readings are in the  ballpark.  Patient stable to continue with current dose of Lantus, along with by Bydureon, full dose Jardiance, and full dose metformin.    Orders:  -     Cancel: Hemoglobin A1c; Future  -     Cancel: Microalbumin / Creatinine Urine Ratio - Urine, Clean Catch; Future  -     Microalbumin / Creatinine Urine Ratio - Urine, Clean Catch; Future  -     Hemoglobin A1c; Future    4. Unstable angina pectoris due to coronary arteriosclerosis ([facility])  Overview:  CAD s/p PTCA '96  He saw Dr. Russell in 11/21 and he recommends stress imaging etc. only if patient having symptoms.       5. Chronic pain with drug dependence ([facility])  -     gabapentin (NEURONTIN) 600 MG tablet; Take 1 tablet by mouth 3 (Three) Times a Day.  Dispense: 270 tablet; Refill: 1  -     traMADol (ULTRAM) 50 MG tablet; 1-2 tablets bid prn  Dispense: 90 tablet; Refill: 1    6. Prostate cancer screening  -     Cancel: PSA Screen; Future  -     PSA Screen; Future    7. Well adult exam  -     Cancel: Hepatitis C Antibody; Future  -     Hepatitis C Antibody; Future    8. Low back pain with sciatica, sciatica laterality unspecified, unspecified back pain laterality, unspecified chronicity  -     XR Spine Lumbar 2 or 3 View; Future    9. Neck pain  -     XR Spine Cervical 2 or 3 View; Future    Other orders  -     aspirin 81 MG EC tablet; Take 1 tablet by mouth Daily.   "Dispense: 90 tablet; Refill: 3  -     atenolol (TENORMIN) 50 MG tablet; Take 1 tablet by mouth Daily.  Dispense: 90 tablet; Refill: 1  -     atorvastatin (LIPITOR) 40 MG tablet; Take 1 tablet by mouth Daily.  Dispense: 90 tablet; Refill: 1  -     empagliflozin (Jardiance) 25 MG tablet tablet; Take 1 tablet by mouth Daily.  Dispense: 90 tablet; Refill: 1  -     exenatide er (Bydureon) 2 MG pen-injector injection; Once a week  Dispense: 12 pen; Refill: 1  -     lisinopril-hydrochlorothiazide (PRINZIDE,ZESTORETIC) 20-12.5 MG per tablet; Take 1 tablet by mouth Daily. Currently taking 0.5 mg daily  Dispense: 45 tablet; Refill: 1  -     metFORMIN (GLUCOPHAGE) 1000 MG tablet; Take 1 tablet by mouth 2 (Two) Times a Day With Meals.  Dispense: 180 tablet; Refill: 1  -     loratadine (CLARITIN) 10 MG tablet; Take 1 tablet by mouth Daily.  Dispense: 90 tablet; Refill: 3  -     Insulin Glargine (LANTUS SOLOSTAR) 100 UNIT/ML injection pen; Inject 20 Units under the skin into the appropriate area as directed Daily for 90 days.  Dispense: 6 pen; Refill: 1  -     pantoprazole (PROTONIX) 40 MG EC tablet; Take 1 tablet by mouth Daily.  Dispense: 90 tablet; Refill: 1  -     glucose blood test strip; 1 each by Other route 2 (Two) Times a Day. Use as instructed  Dispense: 200 each; Refill: 3  -     Insulin Pen Needle (Pen Needles 3/16\") 31G X 5 MM misc; 1 kit Daily.  Dispense: 100 each; Refill: 3  -     Lancets misc; 1 kit 2 (Two) Times a Day.  Dispense: 180 each; Refill: 3  -     docusate sodium (COLACE) 100 MG capsule; Take 1 capsule by mouth 3 (Three) Times a Day.  Dispense: 270 capsule; Refill: 3  -     multivitamin with minerals tablet tablet; Take 1 tablet by mouth Daily.  Dispense: 90 tablet; Refill: 3  -     Td (adult) Vaccine Not Adsorbed     --  --  OLDER NOTES:  VISIT 6/21:  ANNUAL MEDICARE WELLNESS PHYSICAL 9/17 = reviewed all forms with pt in office; no new concerns raised.  DM = A1C as below and OPTHO=20/20 and saw them in " spring '18.  --  DM 2 ('15) and was started on Tanzem 3 mo ago and low dose amaryl was stopped; needs repeat labs, but FBS still in 280 ballpark; last A1C=9.9; will increase Tanzem before add another agent...down 10.8 to 8.5 past 4 months, so no invokana yet...7.6 is great trend, so no changes 1/18...8.5 again so needs jardiance/etc now since this is despite wt down some at 5/18 OV; also, may lose tanzem he tells me...8.2 is w/o any new agents, wt is still trending down, so will wait until after new year to add another if still in the 8's...8.4 and he will find out which one is covered...8.3 and got on Jardiance, so need to max it out as of 5/19 OV...7.9 is ok for now at least...8.1 and he blames it on his diet around holiday time; maxed out on 3 meds; will use lantus if same on RTO...8.3 and I d/w he needs Lantus now=10U qd and titrate...7.4 is nice drop already 9/20---> 7.1 is better 6/21.  (Micro-alb neg 5/20)  --  CAD s/p PTCA '96 with need for SPECT soonish=been too long it sound like; no sxs at least---> needs eval as of 6/21 for dizzy/weak/feels off;   LIPIDS with LDL 14 and TG's 400, ? lab error; will repeat prior to changes...LDL 43 with TG's < 200 is fine...LDL 37/TG's 300 baseline 12/19...LDL 27, so will lower dose now to 40 mg---> 60 is better 1/21  --  HTN remains well controlled and ok to lower to 1/2 tab ACEI/HCT if stays low at home.  ?CKD3 = 52% and will get on RTO in '19... >60%... 40% out of the blue 9/19---> 55% holding 6/21.  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  NEUROPATHY is worse 5/20 and I d/w anodyne is an option; worse when active; will use PRN ultram.  OBESITY with BMI 37 ballpark (TSH neg 5/18).  --  --  [facility].  COLON 1/12 = 2 polyps = 5 yrs per Dr Meade.  Pneumovax x2 ; Prevnar 9/17.  ( 10/21 after 32 years of marriage and she was 10 yrs older = 81 when she  Misty nowak had severe dementia at the end; retired  and then  and retired '14, 1 girl in town)    Follow Up   Return in about 3 months (around 10/25/2022).  Patient was given instructions and counseling regarding his condition or for health maintenance advice. Please see specific information pulled into the AVS if appropriate.

## 2022-07-25 ENCOUNTER — OFFICE VISIT (OUTPATIENT)
Dept: INTERNAL MEDICINE | Facility: CLINIC | Age: 72
End: 2022-07-25

## 2022-07-25 VITALS
SYSTOLIC BLOOD PRESSURE: 129 MMHG | DIASTOLIC BLOOD PRESSURE: 77 MMHG | WEIGHT: 208 LBS | HEIGHT: 66 IN | OXYGEN SATURATION: 96 % | HEART RATE: 77 BPM | TEMPERATURE: 97.5 F | BODY MASS INDEX: 33.43 KG/M2

## 2022-07-25 DIAGNOSIS — G89.29 CHRONIC PAIN WITH DRUG DEPENDENCE: ICD-10-CM

## 2022-07-25 DIAGNOSIS — F19.20 CHRONIC PAIN WITH DRUG DEPENDENCE: ICD-10-CM

## 2022-07-25 DIAGNOSIS — E78.2 MIXED HYPERLIPIDEMIA: Primary | ICD-10-CM

## 2022-07-25 DIAGNOSIS — Z00.00 WELL ADULT EXAM: ICD-10-CM

## 2022-07-25 DIAGNOSIS — I10 PRIMARY HYPERTENSION: ICD-10-CM

## 2022-07-25 DIAGNOSIS — E11.65 TYPE 2 DIABETES MELLITUS WITH HYPERGLYCEMIA, WITH LONG-TERM CURRENT USE OF INSULIN: ICD-10-CM

## 2022-07-25 DIAGNOSIS — M54.40 LOW BACK PAIN WITH SCIATICA, SCIATICA LATERALITY UNSPECIFIED, UNSPECIFIED BACK PAIN LATERALITY, UNSPECIFIED CHRONICITY: ICD-10-CM

## 2022-07-25 DIAGNOSIS — Z79.4 TYPE 2 DIABETES MELLITUS WITH HYPERGLYCEMIA, WITH LONG-TERM CURRENT USE OF INSULIN: ICD-10-CM

## 2022-07-25 DIAGNOSIS — I25.110 UNSTABLE ANGINA PECTORIS DUE TO CORONARY ARTERIOSCLEROSIS: ICD-10-CM

## 2022-07-25 DIAGNOSIS — Z12.5 PROSTATE CANCER SCREENING: ICD-10-CM

## 2022-07-25 DIAGNOSIS — M54.2 NECK PAIN: ICD-10-CM

## 2022-07-25 PROCEDURE — 90714 TD VACC NO PRESV 7 YRS+ IM: CPT | Performed by: INTERNAL MEDICINE

## 2022-07-25 PROCEDURE — 90471 IMMUNIZATION ADMIN: CPT | Performed by: INTERNAL MEDICINE

## 2022-07-25 PROCEDURE — 99214 OFFICE O/P EST MOD 30 MIN: CPT | Performed by: INTERNAL MEDICINE

## 2022-07-25 RX ORDER — LISINOPRIL AND HYDROCHLOROTHIAZIDE 20; 12.5 MG/1; MG/1
1 TABLET ORAL DAILY
Qty: 45 TABLET | Refills: 1 | Status: SHIPPED | OUTPATIENT
Start: 2022-07-25 | End: 2022-10-25 | Stop reason: SDUPTHER

## 2022-07-25 RX ORDER — DOCUSATE SODIUM 100 MG/1
100 CAPSULE, LIQUID FILLED ORAL 3 TIMES DAILY
Qty: 270 CAPSULE | Refills: 3 | Status: SHIPPED | OUTPATIENT
Start: 2022-07-25 | End: 2023-02-06 | Stop reason: SDUPTHER

## 2022-07-25 RX ORDER — TRAMADOL HYDROCHLORIDE 50 MG/1
TABLET ORAL
Qty: 90 TABLET | Refills: 1 | Status: SHIPPED | OUTPATIENT
Start: 2022-07-25

## 2022-07-25 RX ORDER — ASPIRIN 81 MG/1
81 TABLET ORAL DAILY
Qty: 90 TABLET | Refills: 3 | Status: SHIPPED | OUTPATIENT
Start: 2022-07-25 | End: 2023-02-06 | Stop reason: SDUPTHER

## 2022-07-25 RX ORDER — PANTOPRAZOLE SODIUM 40 MG/1
40 TABLET, DELAYED RELEASE ORAL DAILY
Qty: 90 TABLET | Refills: 1 | Status: SHIPPED | OUTPATIENT
Start: 2022-07-25 | End: 2023-02-06 | Stop reason: SDUPTHER

## 2022-07-25 RX ORDER — PEN NEEDLE, DIABETIC 30 GX5/16"
1 NEEDLE, DISPOSABLE MISCELLANEOUS DAILY
Qty: 100 EACH | Refills: 3 | Status: SHIPPED | OUTPATIENT
Start: 2022-07-25

## 2022-07-25 RX ORDER — LANCETS 30 GAUGE
1 EACH MISCELLANEOUS 2 TIMES DAILY
Qty: 180 EACH | Refills: 3 | Status: SHIPPED | OUTPATIENT
Start: 2022-07-25

## 2022-07-25 RX ORDER — GABAPENTIN 600 MG/1
600 TABLET ORAL 3 TIMES DAILY
Qty: 270 TABLET | Refills: 1 | Status: SHIPPED | OUTPATIENT
Start: 2022-07-25 | End: 2023-02-06 | Stop reason: SDUPTHER

## 2022-07-25 RX ORDER — MULTIPLE VITAMINS W/ MINERALS TAB 9MG-400MCG
1 TAB ORAL DAILY
Qty: 90 TABLET | Refills: 3 | Status: SHIPPED | OUTPATIENT
Start: 2022-07-25 | End: 2023-02-06 | Stop reason: SDUPTHER

## 2022-07-25 RX ORDER — ATENOLOL 50 MG/1
50 TABLET ORAL DAILY
Qty: 90 TABLET | Refills: 1 | Status: SHIPPED | OUTPATIENT
Start: 2022-07-25 | End: 2023-02-06 | Stop reason: SDUPTHER

## 2022-07-25 RX ORDER — LORATADINE 10 MG/1
10 TABLET ORAL DAILY
Qty: 90 TABLET | Refills: 3 | Status: SHIPPED | OUTPATIENT
Start: 2022-07-25 | End: 2023-02-06 | Stop reason: SDUPTHER

## 2022-07-25 RX ORDER — EXENATIDE 2 MG/.65ML
INJECTION, SUSPENSION, EXTENDED RELEASE SUBCUTANEOUS
Qty: 12 PEN | Refills: 1 | Status: SHIPPED | OUTPATIENT
Start: 2022-07-25 | End: 2023-01-09 | Stop reason: ALTCHOICE

## 2022-07-25 RX ORDER — ATORVASTATIN CALCIUM 40 MG/1
40 TABLET, FILM COATED ORAL DAILY
Qty: 90 TABLET | Refills: 1 | Status: SHIPPED | OUTPATIENT
Start: 2022-07-25 | End: 2023-02-06 | Stop reason: SDUPTHER

## 2022-07-25 NOTE — ASSESSMENT & PLAN NOTE
LDL is very stable at 56 as of 7/22.  This is easily at goal.  Patient is stable to continue moderate dose atorvastatin.  
Patient blood pressure stable as of 7/22.  We went back to the full dose of Tenormin last time due to some tachycardia, and heart rate is in the 70s.  Prescription says whole tablet, but for the time being he is only on a half of the lisinopril/hct, and should continue as such.  
Patient's A1c is down sharply from 8.1 to 6.3 as of his 7/22 office visit.  He has made some significant changes with his diet at home, and he titrated from 10 to 20 units of Lantus as well.  Patient is keeping a close eye on his sugars, he is not have any significant lows, and his mornings readings are in the  ballpark.  Patient stable to continue with current dose of Lantus, along with by Bydureon, full dose Jardiance, and full dose metformin.  
normal...

## 2022-08-17 ENCOUNTER — HOSPITAL ENCOUNTER (OUTPATIENT)
Dept: GENERAL RADIOLOGY | Facility: HOSPITAL | Age: 72
Discharge: HOME | End: 2022-08-17

## 2022-08-17 ENCOUNTER — TELEPHONE (OUTPATIENT)
Dept: INTERNAL MEDICINE | Facility: CLINIC | Age: 72
End: 2022-08-17

## 2022-08-17 DIAGNOSIS — M54.40 LOW BACK PAIN WITH SCIATICA, SCIATICA LATERALITY UNSPECIFIED, UNSPECIFIED BACK PAIN LATERALITY, UNSPECIFIED CHRONICITY: ICD-10-CM

## 2022-08-17 DIAGNOSIS — M54.2 NECK PAIN: ICD-10-CM

## 2022-08-17 PROCEDURE — 72100 X-RAY EXAM L-S SPINE 2/3 VWS: CPT

## 2022-08-17 PROCEDURE — 72040 X-RAY EXAM NECK SPINE 2-3 VW: CPT

## 2022-08-17 NOTE — TELEPHONE ENCOUNTER
See if patient's insurance will cover anodyne home treatment.  The patient benefited from this in therapy, albeit the relief only lasted for a day or so.  Perhaps the patient had a machine at home to use more frequently, he would have increased benefit.  Patient has diabetic peripheral neuropathy.  Not sure how to write a prescription for this.  Thanks

## 2022-10-04 ENCOUNTER — OFFICE VISIT (OUTPATIENT)
Dept: PODIATRY | Facility: CLINIC | Age: 72
End: 2022-10-04

## 2022-10-04 VITALS
HEART RATE: 98 BPM | SYSTOLIC BLOOD PRESSURE: 128 MMHG | WEIGHT: 204 LBS | DIASTOLIC BLOOD PRESSURE: 81 MMHG | OXYGEN SATURATION: 97 % | TEMPERATURE: 96.7 F | HEIGHT: 66 IN | BODY MASS INDEX: 32.78 KG/M2

## 2022-10-04 DIAGNOSIS — Z79.4 TYPE 2 DIABETES MELLITUS WITH DIABETIC POLYNEUROPATHY, WITH LONG-TERM CURRENT USE OF INSULIN: ICD-10-CM

## 2022-10-04 DIAGNOSIS — B35.1 ONYCHOMYCOSIS: ICD-10-CM

## 2022-10-04 DIAGNOSIS — M79.671 FOOT PAIN, BILATERAL: ICD-10-CM

## 2022-10-04 DIAGNOSIS — E11.42 TYPE 2 DIABETES MELLITUS WITH DIABETIC POLYNEUROPATHY, WITH LONG-TERM CURRENT USE OF INSULIN: ICD-10-CM

## 2022-10-04 DIAGNOSIS — M79.672 FOOT PAIN, BILATERAL: ICD-10-CM

## 2022-10-04 DIAGNOSIS — G62.9 NEUROPATHY: Primary | ICD-10-CM

## 2022-10-04 DIAGNOSIS — L60.0 ONYCHOCRYPTOSIS: ICD-10-CM

## 2022-10-04 DIAGNOSIS — E11.8 DIABETIC FOOT: ICD-10-CM

## 2022-10-04 PROCEDURE — G8404 LOW EXTEMITY NEUR EXAM DOCUM: HCPCS | Performed by: PODIATRIST

## 2022-10-04 PROCEDURE — 11721 DEBRIDE NAIL 6 OR MORE: CPT | Performed by: PODIATRIST

## 2022-10-04 NOTE — PROGRESS NOTES
[facility] - PODIATRY    Today's Date: 10/04/22    Patient Name: Norberto Whiteside  MRN: [id number]  CSN: [id number]  PCP: Moreno Pereira MD, Last PCP Visit: 2022  Referring Provider: No ref. provider found    SUBJECTIVE     Chief Complaint   Patient presents with   • Right Foot - Follow-up, Nail Problem     Torn toenail R5 2 weeks ago.    • Left Foot - Follow-up, Nail Problem     HPI: Norberto Whiteside, a 72 y.o.male, comes to clinic.    New, Established, New Problem:  est    Location:  Toenails    Duration:   Greater than five years    Onset:  Gradual    Nature:  sore with palpation.    Stable, worsening, improving:   Stable    Aggravating factors:  Pain with shoe gear and ambulation.    Previous Treatment:  debridement    Patient reported last blood glucose: 140.    Medical changes: none    Patient denies any fevers, chills, nausea, vomiting, shortness of breathe, nor any other constitutional signs nor symptoms.       Past Medical History:   Diagnosis Date   • Allergic     Seasonal   • Allergies    • Callus    • Cancer (HCC)     skin cancer   • Diabetes mellitus (HCC)    • Hard of hearing    • Hypertension    • Sciatica    • Sleep apnea      Past Surgical History:   Procedure Laterality Date   • ANGIOPLASTY     • COLONOSCOPY N/A 2022    Procedure: COLONOSCOPY with polypectomies;  Surgeon: Kanu Meade MD;  Location: [facility] ENDOSCOPY;  Service: General;  Laterality: N/A;  colon polyps     Family History   Problem Relation Age of Onset   • Stroke Mother    • Cancer Mother    • Cancer Father    • Cancer Sister    • Malig Hyperthermia Neg Hx      Social History     Socioeconomic History   • Marital status:    Tobacco Use   • Smoking status: Former Smoker     Packs/day: 1.50     Years: 15.00     Pack years: 22.50     Types: Cigarettes     Start date: 3/1/1979     Quit date: 3/1/1996     Years since quittin.6   • Smokeless tobacco: Never Used   Vaping Use   • Vaping Use: Never  "used   Substance and Sexual Activity   • Alcohol use: Yes     Comment: rare   • Drug use: Never   • Sexual activity: Defer     No Known Allergies  Current Outpatient Medications   Medication Sig Dispense Refill   • aspirin 81 MG EC tablet Take 1 tablet by mouth Daily. 90 tablet 3   • atenolol (TENORMIN) 50 MG tablet Take 1 tablet by mouth Daily. 90 tablet 1   • atorvastatin (LIPITOR) 40 MG tablet Take 1 tablet by mouth Daily. 90 tablet 1   • docusate sodium (COLACE) 100 MG capsule Take 1 capsule by mouth 3 (Three) Times a Day. 270 capsule 3   • empagliflozin (Jardiance) 25 MG tablet tablet Take 1 tablet by mouth Daily. 90 tablet 1   • exenatide er (Bydureon) 2 MG pen-injector injection Once a week 12 pen 1   • gabapentin (NEURONTIN) 600 MG tablet Take 1 tablet by mouth 3 (Three) Times a Day. 270 tablet 1   • glucose blood test strip 1 each by Other route 2 (Two) Times a Day. Use as instructed 200 each 3   • Insulin Glargine (LANTUS SOLOSTAR) 100 UNIT/ML injection pen Inject 20 Units under the skin into the appropriate area as directed Daily for 90 days. 6 pen 1   • Insulin Pen Needle (Pen Needles 3/16\") 31G X 5 MM misc 1 kit Daily. 100 each 3   • Lancets misc 1 kit 2 (Two) Times a Day. 180 each 3   • lisinopril-hydrochlorothiazide (PRINZIDE,ZESTORETIC) 20-12.5 MG per tablet Take 1 tablet by mouth Daily. Currently taking 0.5 mg daily 45 tablet 1   • loratadine (CLARITIN) 10 MG tablet Take 1 tablet by mouth Daily. 90 tablet 3   • metFORMIN (GLUCOPHAGE) 1000 MG tablet Take 1 tablet by mouth 2 (Two) Times a Day With Meals. 180 tablet 1   • multivitamin with minerals tablet tablet Take 1 tablet by mouth Daily. 90 tablet 3   • pantoprazole (PROTONIX) 40 MG EC tablet Take 1 tablet by mouth Daily. 90 tablet 1   • traMADol (ULTRAM) 50 MG tablet 1-2 tablets bid prn 90 tablet 1     No current facility-administered medications for this visit.     Review of Systems   Constitutional: Negative.    Skin:        Painful toenails "   Neurological: Positive for numbness.   All other systems reviewed and are negative.      OBJECTIVE     Vitals:    10/04/22 1317   BP: 128/81   Pulse: 98   Temp: 96.7 °F (35.9 °C)   SpO2: 97%       Lab Results   Component Value Date    HGBA1C 6.30 (H) 07/20/2022       Lab Results   Component Value Date    GLUCOSE 97 07/20/2022    CALCIUM 9.6 07/20/2022     07/20/2022    K 4.6 07/20/2022    CO2 24.5 07/20/2022     07/20/2022    BUN 17 07/20/2022    CREATININE 0.98 07/20/2022    EGFRIFNONA 69 01/14/2022    BCR 17.3 07/20/2022    ANIONGAP 13.5 07/20/2022       Patient seen in no apparent distress.      PHYSICAL EXAM:     Foot/Ankle Exam:       General:   Diabetic Foot Exam Performed    Appearance: elderly    Orientation: AAOx3    Affect: appropriate    Gait: unimpaired    Shoe Gear:  Casual shoes    VASCULAR      Right Foot Vascularity   Normal vascular exam    Dorsalis pedis:  1+  Posterior tibial:  1+  Skin Temperature: cool    Edema Grading:  None  CFT:  < 3 seconds  Pedal Hair Growth:  Present  Varicosities: mild varicosities       Left Foot Vascularity   Normal vascular exam    Dorsalis pedis:  1+  Posterior tibial:  1+  Skin Temperature: cool    Edema Grading:  None  CFT:  < 3 seconds  Pedal Hair Growth:  Present  Varicosities: mild varicosities        NEUROLOGIC     Right Foot Neurologic   Light touch sensation:  Diminished  Vibratory sensation:  Diminished  Hot/Cold sensation: diminished    Protective Sensation using Rewey-Keiko Monofilament:  2     Left Foot Neurologic   Light touch sensation:  Diminished  Vibratory sensation:  Diminished  Hot/cold sensation: diminished    Protective Sensation using Rewey-Keiko Monofilament:  2     MUSCLE STRENGTH     Right Foot Muscle Strength   Foot dorsiflexion:  4  Foot plantar flexion:  4  Foot inversion:  4  Foot eversion:  4     Left Foot Muscle Strength   Foot dorsiflexion:  4  Foot plantar flexion:  4  Foot inversion:  4  Foot eversion:  4      RANGE OF MOTION      Right Foot Range of Motion   Foot and ankle ROM within normal limits       Left Foot Range of Motion   Foot and ankle ROM within normal limits       DERMATOLOGIC     Right Foot Dermatologic   Skin: skin intact    Nails: onychomycosis, abnormally thick, subungual debris and dystrophic nails    Nails comment:  Toenails 1, 2, 3, 4, and 5     Left Foot Dermatologic   Skin: skin intact    Nails: onychomycosis, abnormally thick, subungual debris, dystrophic nails and ingrown toenail    Nails comment:  Toenails 1, 2, 3, 4, and 5      Diabetic Foot Exam Performed    ASSESSMENT/PLAN     Diagnoses and all orders for this visit:    1. Neuropathy (Primary)    2. Onychocryptosis    3. Diabetic foot (HCC)    4. Foot pain, bilateral    5. Type 2 diabetes mellitus with diabetic polyneuropathy, with long-term current use of insulin (HCC)    6. Onychomycosis        Comprehensive lower extremity examination and evaluation was performed.    Discussed findings and treatment plan including risks, benefits, and treatment options with patient in detail. Patient agreed with treatment plan.    Toenails 1 through 5 bilaterally were debrided in thickness and length and then smoothed with a Dremel Tool.  Tolerated the procedure well without complications.    Diabetic foot exam performed and documented this date, compliant with CQM required standards. Detail of findings as noted in physical exam.  Lower extremity Neurologic exam for diabetic patient performed and documented this date, compliant with PQRS required standards. Detail of findings as noted in physical exam.  Advised patient importance of good routine lower extremity hygiene. Advised patient importance of evaluating for intact skin and pain free nail borders.  Advised patient to use mirror to evaluate plantar/ soles of feet for better visualization. Advised patient monitor and phone office to be seen if any cracking to skin, open lesions, painful nail borders or  if nails become elongated prior to next visit. Advised patient importance of daily cleansing of lower extremities, followed by good skin cream to maintain normal hydration of skin. Also advised patient importance of close daily monitoring of blood sugar. Advised to regulate diet and medications to maintain control of blood sugar in optimal range. Contact primary care provider if difficulties maintaining blood sugar levels.  Advised Patient of presence of Diabetes Mellitus condition.  Advised Patient risk of progression and worsening or improvement, then return of condition.  Will monitor condition for any change in future. Treat with most appropriate treatment pending status of condition.  Counseled and advised patient extensively on nature and ramifications of diabetes. Standard instructions given to patient for good diabetic foot care and maintenance. Advised importance of careful monitoring to avoid break down and complications secondary to diabetes. Advised patient importance of strict maintenance of blood sugar control. Advised patient of possible ominous results from neglect of condition, i.e.: amputation/ loss of digits, feet and legs, or even death.  Patient states understands counseling, will monitor closely, continue good hygiene and routine diabetic foot care. Patient will contact office is questions or problems.      An After Visit Summary was printed and given to the patient at discharge, including (if requested) any available informative/educational handouts regarding diagnosis, treatment, or medications. All questions were answered to patient/family satisfaction. Should symptoms fail to improve or worsen they agree to call or return to clinic or to go to the Emergency Department. Discussed the importance of following up with any needed screening tests/labs/specialist appointments and any requested follow-up recommended by me today. Importance of maintaining follow-up discussed and patient accepts that  missed appointments can delay diagnosis and potentially lead to worsening of conditions.    Return in about 9 weeks (around 12/6/2022) for Toenail Care., or sooner if acute issues arise.    This document has been electronically signed by Wayne Foster DPM on October 4, 2022 13:31 EDT

## 2022-10-19 ENCOUNTER — LAB (OUTPATIENT)
Dept: LAB | Facility: HOSPITAL | Age: 72
End: 2022-10-19

## 2022-10-19 DIAGNOSIS — Z79.4 TYPE 2 DIABETES MELLITUS WITH HYPERGLYCEMIA, WITH LONG-TERM CURRENT USE OF INSULIN: ICD-10-CM

## 2022-10-19 DIAGNOSIS — Z00.00 WELL ADULT EXAM: ICD-10-CM

## 2022-10-19 DIAGNOSIS — Z12.5 PROSTATE CANCER SCREENING: ICD-10-CM

## 2022-10-19 DIAGNOSIS — E11.65 TYPE 2 DIABETES MELLITUS WITH HYPERGLYCEMIA, WITH LONG-TERM CURRENT USE OF INSULIN: ICD-10-CM

## 2022-10-19 DIAGNOSIS — I10 PRIMARY HYPERTENSION: ICD-10-CM

## 2022-10-19 LAB
ALBUMIN UR-MCNC: <1.2 MG/DL
ANION GAP SERPL CALCULATED.3IONS-SCNC: 10 MMOL/L (ref 5–15)
BUN SERPL-MCNC: 17 MG/DL (ref 8–23)
BUN/CREAT SERPL: 15.9 (ref 7–25)
CALCIUM SPEC-SCNC: 10.1 MG/DL (ref 8.6–10.5)
CHLORIDE SERPL-SCNC: 99 MMOL/L (ref 98–107)
CO2 SERPL-SCNC: 28 MMOL/L (ref 22–29)
CREAT SERPL-MCNC: 1.07 MG/DL (ref 0.76–1.27)
CREAT UR-MCNC: 51.9 MG/DL
EGFRCR SERPLBLD CKD-EPI 2021: 74.7 ML/MIN/1.73
GLUCOSE SERPL-MCNC: 132 MG/DL (ref 65–99)
HBA1C MFR BLD: 6.6 % (ref 4.8–5.6)
HCV AB SER DONR QL: NORMAL
MICROALBUMIN/CREAT UR: NORMAL MG/G{CREAT}
POTASSIUM SERPL-SCNC: 5.1 MMOL/L (ref 3.5–5.2)
PSA SERPL-MCNC: 0.55 NG/ML (ref 0–4)
SODIUM SERPL-SCNC: 137 MMOL/L (ref 136–145)

## 2022-10-19 PROCEDURE — 80048 BASIC METABOLIC PNL TOTAL CA: CPT

## 2022-10-19 PROCEDURE — 83036 HEMOGLOBIN GLYCOSYLATED A1C: CPT

## 2022-10-19 PROCEDURE — 82570 ASSAY OF URINE CREATININE: CPT

## 2022-10-19 PROCEDURE — 82043 UR ALBUMIN QUANTITATIVE: CPT

## 2022-10-19 PROCEDURE — 36415 COLL VENOUS BLD VENIPUNCTURE: CPT

## 2022-10-19 PROCEDURE — G0103 PSA SCREENING: HCPCS

## 2022-10-19 PROCEDURE — 86803 HEPATITIS C AB TEST: CPT

## 2022-10-25 ENCOUNTER — OFFICE VISIT (OUTPATIENT)
Dept: INTERNAL MEDICINE | Facility: CLINIC | Age: 72
End: 2022-10-25

## 2022-10-25 VITALS
TEMPERATURE: 97 F | OXYGEN SATURATION: 95 % | WEIGHT: 206.6 LBS | BODY MASS INDEX: 33.2 KG/M2 | DIASTOLIC BLOOD PRESSURE: 78 MMHG | HEIGHT: 66 IN | HEART RATE: 86 BPM | SYSTOLIC BLOOD PRESSURE: 128 MMHG

## 2022-10-25 DIAGNOSIS — E11.65 TYPE 2 DIABETES MELLITUS WITH HYPERGLYCEMIA, WITH LONG-TERM CURRENT USE OF INSULIN: Primary | ICD-10-CM

## 2022-10-25 DIAGNOSIS — I25.110 UNSTABLE ANGINA PECTORIS DUE TO CORONARY ARTERIOSCLEROSIS: ICD-10-CM

## 2022-10-25 DIAGNOSIS — Z87.891 ENCOUNTER FOR SCREENING FOR ABDOMINAL AORTIC ANEURYSM (AAA) IN PATIENT 50 YEARS OF AGE OR OLDER WITH HISTORY OF SMOKING: ICD-10-CM

## 2022-10-25 DIAGNOSIS — R53.83 OTHER FATIGUE: ICD-10-CM

## 2022-10-25 DIAGNOSIS — Z23 NEED FOR VACCINATION: ICD-10-CM

## 2022-10-25 DIAGNOSIS — Z79.4 TYPE 2 DIABETES MELLITUS WITH HYPERGLYCEMIA, WITH LONG-TERM CURRENT USE OF INSULIN: Primary | ICD-10-CM

## 2022-10-25 DIAGNOSIS — Z13.6 ENCOUNTER FOR SCREENING FOR ABDOMINAL AORTIC ANEURYSM (AAA) IN PATIENT 50 YEARS OF AGE OR OLDER WITH HISTORY OF SMOKING: ICD-10-CM

## 2022-10-25 DIAGNOSIS — E78.2 MIXED HYPERLIPIDEMIA: ICD-10-CM

## 2022-10-25 DIAGNOSIS — I10 PRIMARY HYPERTENSION: ICD-10-CM

## 2022-10-25 PROCEDURE — G0008 ADMIN INFLUENZA VIRUS VAC: HCPCS | Performed by: INTERNAL MEDICINE

## 2022-10-25 PROCEDURE — 99214 OFFICE O/P EST MOD 30 MIN: CPT | Performed by: INTERNAL MEDICINE

## 2022-10-25 PROCEDURE — 0124A COVID-19 (PFIZER) BIVALENT BOOSTER 12+YRS: CPT | Performed by: INTERNAL MEDICINE

## 2022-10-25 PROCEDURE — 91312 COVID-19 (PFIZER) BIVALENT BOOSTER 12+YRS: CPT | Performed by: INTERNAL MEDICINE

## 2022-10-25 PROCEDURE — 90662 IIV NO PRSV INCREASED AG IM: CPT | Performed by: INTERNAL MEDICINE

## 2022-10-25 RX ORDER — LISINOPRIL AND HYDROCHLOROTHIAZIDE 12.5; 1 MG/1; MG/1
1 TABLET ORAL DAILY
Qty: 90 TABLET | Refills: 1 | Status: SHIPPED | OUTPATIENT
Start: 2022-10-25 | End: 2023-01-09 | Stop reason: ALTCHOICE

## 2022-10-25 NOTE — PROGRESS NOTES
Chief Complaint  Hyperlipidemia and Follow-up (Routine follow up with labs taken )    Subjective          Norberto Whiteside presents to [facility]     Diabetes  He has type 2 diabetes mellitus. No MedicAlert identification noted. The initial diagnosis of diabetes was made 12 Years ago. Hypoglycemia symptoms include mood changes, sleepiness and tremors. Pertinent negatives for hypoglycemia include no confusion, dizziness, headaches, hunger, nervousness/anxiousness, pallor, seizures, speech difficulty or sweats. Associated symptoms include foot paresthesias, polydipsia, polyuria, weakness and weight loss. Pertinent negatives for diabetes include no blurred vision, no chest pain, no fatigue, no foot ulcerations, no polyphagia and no visual change. Pertinent negatives for hypoglycemia complications include no blackouts, no hospitalization, no nocturnal hypoglycemia, no required assistance and no required glucagon injection. Symptoms are stable. Diabetic complications include impotence, peripheral neuropathy and PVD. Pertinent negatives for diabetic complications include no CVA, heart disease, nephropathy or retinopathy. Risk factors for coronary artery disease include dyslipidemia, hypertension and obesity. Current diabetic treatment includes diet, insulin injections and oral agent (dual therapy). He is compliant with treatment most of the time. He is currently taking insulin pre-lunch. Insulin injections are given by patient. Rotation sites for injection include the thighs. His weight is stable. When asked about meal planning, he reported none. He has not had a previous visit with a dietitian. He participates in exercise three times a week. He monitors blood glucose at home 1-2 x per day. He monitors urine at home <1 x per month. Blood glucose monitoring compliance is fair. There is no change in his home blood glucose trend. His breakfast blood glucose is taken between 8-9 am. His  "breakfast blood glucose range is generally 130-140 mg/dl. His highest blood glucose is 140-180 mg/dl. He sees a podiatrist.Eye exam is current.     HPI:  Patient 72-year-old male with underlying diabetes mellitus, hypertension, hyperlipidemia, resultant coronary artery disease with prior stent, who is coming in 10/22 for routine 3-month follow-up.  We will review his labs and make further recommendations at that time.    Review of Systems   Constitutional: Positive for unexpected weight loss. Negative for appetite change, fatigue and fever.   HENT: Negative for congestion and ear pain.    Eyes: Negative for blurred vision.   Respiratory: Negative for cough, chest tightness, shortness of breath and wheezing.    Cardiovascular: Negative for chest pain, palpitations and leg swelling.   Gastrointestinal: Negative for abdominal pain.   Endocrine: Positive for polydipsia and polyuria. Negative for polyphagia.   Genitourinary: Positive for impotence. Negative for difficulty urinating, dysuria and hematuria.   Musculoskeletal: Negative for arthralgias and gait problem.   Skin: Negative for pallor and skin lesions.   Neurological: Positive for tremors and weakness. Negative for dizziness, seizures, syncope, speech difficulty, memory problem and confusion.   Psychiatric/Behavioral: Negative for self-injury and depressed mood. The patient is not nervous/anxious.        Objective   Vital Signs:   /78 (BP Location: Left arm, Patient Position: Sitting, Cuff Size: Adult)   Pulse 86   Temp 97 °F (36.1 °C)   Ht 167.6 cm (65.98\")   Wt 93.7 kg (206 lb 9.6 oz)   SpO2 95%   BMI 33.36 kg/m²           Physical Exam  Vitals and nursing note reviewed.   Constitutional:       General: He is not in acute distress.     Appearance: Normal appearance. He is not toxic-appearing.   HENT:      Head: Atraumatic.      Right Ear: External ear normal.      Left Ear: External ear normal.      Nose: Nose normal.      Mouth/Throat:      Mouth: " Mucous membranes are moist.   Eyes:      General:         Right eye: No discharge.         Left eye: No discharge.      Extraocular Movements: Extraocular movements intact.      Pupils: Pupils are equal, round, and reactive to light.   Cardiovascular:      Rate and Rhythm: Normal rate and regular rhythm.      Pulses: Normal pulses.      Heart sounds: Normal heart sounds. No murmur heard.    No gallop.   Pulmonary:      Effort: Pulmonary effort is normal. No respiratory distress.      Breath sounds: No wheezing, rhonchi or rales.   Abdominal:      General: There is no distension.      Palpations: Abdomen is soft. There is no mass.      Tenderness: There is no abdominal tenderness. There is no guarding.   Musculoskeletal:         General: No swelling or tenderness.      Cervical back: No tenderness.      Right lower leg: No edema.      Left lower leg: No edema.   Skin:     General: Skin is warm and dry.      Findings: No rash.   Neurological:      General: No focal deficit present.      Mental Status: He is alert and oriented to person, place, and time. Mental status is at baseline.      Motor: No weakness.      Gait: Gait normal.   Psychiatric:         Mood and Affect: Mood normal.         Thought Content: Thought content normal.          Result Review :   The following data was reviewed by: Moreno Pereira MD on 10/05/2021:                  Assessment and Plan    Diagnoses and all orders for this visit:    1. Type 2 diabetes mellitus with hyperglycemia, with long-term current use of insulin (HCC) (Primary)  Assessment & Plan:  A1c is stable at 6.6 as of his 10/22 office visit.  He denies any hypoglycemic episodes with his aggressive regimen.  He will continue with 20 units of Lantus daily along with weekly Bydureon on an full dose Jardiance and metformin.    Orders:  -     Hemoglobin A1c; Future    2. Mixed hyperlipidemia  Assessment & Plan:  LDL was at goal in 7/22, patient will continue with moderate dose  atorvastatin, will repeat labs after the new year.    Orders:  -     Lipid Panel; Future    3. Primary hypertension  Assessment & Plan:  Blood pressure remains well controlled as of his 10/22 office visit.  He is stable to continue with moderate dose lisinopril/HCT and moderate dose Tenormin.    Orders:  -     Comprehensive Metabolic Panel; Future    4. Unstable angina pectoris due to coronary arteriosclerosis (HCC)  Overview:  CAD s/p PTCA '96  He saw Dr. Russell in 11/21 and he recommends stress imaging etc. only if patient having symptoms.       5. Need for vaccination  -     Fluzone High-Dose 65+yrs (1305-0656)  -     COVID-19 Bivalent Booster (Pfizer) 12+yrs    6. Other fatigue  -     CBC & Differential; Future  -     TSH; Future    7. Encounter for screening for abdominal aortic aneurysm (AAA) in patient 50 years of age or older with history of smoking  -     [place] aaa screen limited; Future    Other orders  -     lisinopril-hydrochlorothiazide (Zestoretic) 10-12.5 MG per tablet; Take 1 tablet by mouth Daily.  Dispense: 90 tablet; Refill: 1     --  --  OLDER NOTES:  VISIT 6/21:  ANNUAL MEDICARE WELLNESS PHYSICAL 9/17 = reviewed all forms with pt in office; no new concerns raised.  DM = A1C as below and OPTHO=20/20 and saw them in spring '18.  --  DM 2 ('15) and was started on Tanzem 3 mo ago and low dose amaryl was stopped; needs repeat labs, but FBS still in 280 ballpark; last A1C=9.9; will increase Tanzem before add another agent...down 10.8 to 8.5 past 4 months, so no invokana yet...7.6 is great trend, so no changes 1/18...8.5 again so needs jardiance/etc now since this is despite wt down some at 5/18 OV; also, may lose tanzem he tells me...8.2 is w/o any new agents, wt is still trending down, so will wait until after new year to add another if still in the 8's...8.4 and he will find out which one is covered...8.3 and got on Jardiance, so need to max it out as of 5/19 OV...7.9 is ok for now at least...8.1 and  he blames it on his diet around holiday time; maxed out on 3 meds; will use lantus if same on RTO...8.3 and I d/w he needs Lantus now=10U qd and titrate...7.4 is nice drop already 9/20---> 7.1 is better 6/21.  (Micro-alb neg 5/20)  --  CAD s/p PTCA '96 with need for SPECT soonish=been too long it sound like; no sxs at least---> needs eval as of 6/21 for dizzy/weak/feels off;   LIPIDS with LDL 14 and TG's 400, ? lab error; will repeat prior to changes...LDL 43 with TG's < 200 is fine...LDL 37/TG's 300 baseline 12/19...LDL 27, so will lower dose now to 40 mg---> 60 is better 1/21  --  HTN remains well controlled and ok to lower to 1/2 tab ACEI/HCT if stays low at home.  ?CKD3 = 52% and will get on RTO in '19... >60%... 40% out of the blue 9/19---> 55% holding 6/21.  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  NEUROPATHY is worse 5/20 and I d/w anodyne is an option; worse when active; will use PRN ultram.  OBESITY with BMI 37 ballpark (TSH neg 5/18).  --  --  PSA 0.5 (10/19/2022)   COLON 1/12 = 2 polyps = 5 yrs per Dr Meade.  Pneumovax x2 ; Prevnar 9/17.  ( 10/21 after 32 years of marriage and she was 10 yrs older = 81 when she passeed Sun had severe dementia at the end; retired  and then  and retired '14, 1 girl in town)    Follow Up   Return in about 3 months (around 2/6/2023).  Patient was given instructions and counseling regarding his condition or for health maintenance advice. Please see specific information pulled into the AVS if appropriate.

## 2022-10-25 NOTE — ASSESSMENT & PLAN NOTE
LDL was at goal in 7/22, patient will continue with moderate dose atorvastatin, will repeat labs after the new year.

## 2022-10-25 NOTE — ASSESSMENT & PLAN NOTE
A1c is stable at 6.6 as of his 10/22 office visit.  He denies any hypoglycemic episodes with his aggressive regimen.  He will continue with 20 units of Lantus daily along with weekly Bydureon on an full dose Jardiance and metformin.

## 2022-10-25 NOTE — ASSESSMENT & PLAN NOTE
Blood pressure remains well controlled as of his 10/22 office visit.  He is stable to continue with moderate dose lisinopril/HCT and moderate dose Tenormin.

## 2022-11-22 ENCOUNTER — HOSPITAL ENCOUNTER (OUTPATIENT)
Dept: ULTRASOUND IMAGING | Facility: HOSPITAL | Age: 72
Discharge: HOME | End: 2022-11-22
Admitting: INTERNAL MEDICINE

## 2022-11-22 DIAGNOSIS — Z87.891 ENCOUNTER FOR SCREENING FOR ABDOMINAL AORTIC ANEURYSM (AAA) IN PATIENT 50 YEARS OF AGE OR OLDER WITH HISTORY OF SMOKING: ICD-10-CM

## 2022-11-22 DIAGNOSIS — Z13.6 ENCOUNTER FOR SCREENING FOR ABDOMINAL AORTIC ANEURYSM (AAA) IN PATIENT 50 YEARS OF AGE OR OLDER WITH HISTORY OF SMOKING: ICD-10-CM

## 2022-11-22 PROCEDURE — 76706 US ABDL AORTA SCREEN AAA: CPT

## 2023-01-09 ENCOUNTER — OFFICE VISIT (OUTPATIENT)
Dept: PODIATRY | Facility: CLINIC | Age: 73
End: 2023-01-09
Payer: MEDICARE

## 2023-01-09 VITALS
DIASTOLIC BLOOD PRESSURE: 63 MMHG | WEIGHT: 208 LBS | TEMPERATURE: 96.7 F | HEART RATE: 81 BPM | HEIGHT: 66 IN | BODY MASS INDEX: 33.43 KG/M2 | OXYGEN SATURATION: 98 % | SYSTOLIC BLOOD PRESSURE: 115 MMHG

## 2023-01-09 DIAGNOSIS — E11.42 TYPE 2 DIABETES MELLITUS WITH DIABETIC POLYNEUROPATHY, WITH LONG-TERM CURRENT USE OF INSULIN: ICD-10-CM

## 2023-01-09 DIAGNOSIS — G62.9 NEUROPATHY: Primary | ICD-10-CM

## 2023-01-09 DIAGNOSIS — M79.671 FOOT PAIN, BILATERAL: ICD-10-CM

## 2023-01-09 DIAGNOSIS — Z79.4 TYPE 2 DIABETES MELLITUS WITH DIABETIC POLYNEUROPATHY, WITH LONG-TERM CURRENT USE OF INSULIN: ICD-10-CM

## 2023-01-09 DIAGNOSIS — E11.8 DIABETIC FOOT: ICD-10-CM

## 2023-01-09 DIAGNOSIS — M79.672 FOOT PAIN, BILATERAL: ICD-10-CM

## 2023-01-09 DIAGNOSIS — L60.0 ONYCHOCRYPTOSIS: ICD-10-CM

## 2023-01-09 DIAGNOSIS — B35.1 ONYCHOMYCOSIS: ICD-10-CM

## 2023-01-09 PROCEDURE — G8404 LOW EXTEMITY NEUR EXAM DOCUM: HCPCS | Performed by: PODIATRIST

## 2023-01-09 PROCEDURE — 11721 DEBRIDE NAIL 6 OR MORE: CPT | Performed by: PODIATRIST

## 2023-01-09 RX ORDER — EXENATIDE 2 MG/.85ML
INJECTION, SUSPENSION, EXTENDED RELEASE SUBCUTANEOUS
COMMUNITY
Start: 2022-11-27 | End: 2023-02-06 | Stop reason: RX

## 2023-01-09 RX ORDER — LISINOPRIL AND HYDROCHLOROTHIAZIDE 12.5; 1 MG/1; MG/1
1 TABLET ORAL DAILY
COMMUNITY
Start: 2022-11-27 | End: 2023-02-06 | Stop reason: SDUPTHER

## 2023-01-09 NOTE — PROGRESS NOTES
[facility] - PODIATRY    Today's Date: 23    Patient Name: Norberto Whiteside  MRN: [id number]  CSN: [id number]  PCP: Moreno Pereira MD, Last PCP Visit: 10/25/2022  Referring Provider: No ref. provider found    SUBJECTIVE     Chief Complaint   Patient presents with   • Left Foot - Follow-up, Nail Problem, Callouses   • Right Foot - Follow-up, Nail Problem     HPI: Norberto Whiteside, a 72 y.o.male, comes to clinic.    New, Established, New Problem:  est    Location:  Toenails    Duration:   Greater than five years    Onset:  Gradual    Nature:  sore with palpation.    Stable, worsening, improving:   Stable    Aggravating factors:  Pain with shoe gear and ambulation.    Previous Treatment:  debridement    Patient reported last blood glucose: 138.    Medical changes: Treatment for dog bite on left hand    Patient denies any fevers, chills, nausea, vomiting, shortness of breathe, nor any other constitutional signs nor symptoms.       Past Medical History:   Diagnosis Date   • Allergic     Seasonal   • Allergies    • Callus    • Cancer (HCC)     skin cancer   • Diabetes mellitus (HCC)    • Hard of hearing    • Hypertension    • Sciatica    • Sleep apnea      Past Surgical History:   Procedure Laterality Date   • ANGIOPLASTY     • COLONOSCOPY N/A 2022    Procedure: COLONOSCOPY with polypectomies;  Surgeon: Kanu Meade MD;  Location: [facility] ENDOSCOPY;  Service: General;  Laterality: N/A;  colon polyps     Family History   Problem Relation Age of Onset   • Stroke Mother    • Cancer Mother    • Cancer Father    • Cancer Sister    • Malig Hyperthermia Neg Hx      Social History     Socioeconomic History   • Marital status:    Tobacco Use   • Smoking status: Former     Packs/day: 1.50     Years: 15.00     Pack years: 22.50     Types: Cigarettes     Start date: 3/1/1979     Quit date: 3/1/1996     Years since quittin.8   • Smokeless tobacco: Never   Vaping Use   • Vaping Use: Never used    Substance and Sexual Activity   • Alcohol use: Yes     Comment: rare   • Drug use: Never   • Sexual activity: Defer     No Known Allergies  Current Outpatient Medications   Medication Sig Dispense Refill   • aspirin 81 MG EC tablet Take 1 tablet by mouth Daily. 90 tablet 3   • atenolol (TENORMIN) 50 MG tablet Take 1 tablet by mouth Daily. 90 tablet 1   • atorvastatin (LIPITOR) 40 MG tablet Take 1 tablet by mouth Daily. 90 tablet 1   • Bydureon BCise 2 MG/0.85ML auto-injector injection      • docusate sodium (COLACE) 100 MG capsule Take 1 capsule by mouth 3 (Three) Times a Day. 270 capsule 3   • empagliflozin (Jardiance) 25 MG tablet tablet Take 1 tablet by mouth Daily. 90 tablet 1   • gabapentin (NEURONTIN) 600 MG tablet Take 1 tablet by mouth 3 (Three) Times a Day. 270 tablet 1   • glucose blood test strip 1 each by Other route 2 (Two) Times a Day. Use as instructed 200 each 3   • Insulin Glargine (LANTUS SOLOSTAR) 100 UNIT/ML injection pen Inject 20 Units under the skin into the appropriate area as directed Daily for 90 days. 6 pen 1   • Insulin Pen Needle (Pen Needles 3/16\") 31G X 5 MM misc 1 kit Daily. 100 each 3   • Lancets misc 1 kit 2 (Two) Times a Day. 180 each 3   • lisinopril-hydrochlorothiazide (PRINZIDE,ZESTORETIC) 20-12.5 MG per tablet Take 1/2 tablet daily     • loratadine (CLARITIN) 10 MG tablet Take 1 tablet by mouth Daily. 90 tablet 3   • metFORMIN (GLUCOPHAGE) 1000 MG tablet Take 1 tablet by mouth 2 (Two) Times a Day With Meals. 180 tablet 1   • multivitamin with minerals tablet tablet Take 1 tablet by mouth Daily. 90 tablet 3   • pantoprazole (PROTONIX) 40 MG EC tablet Take 1 tablet by mouth Daily. 90 tablet 1   • traMADol (ULTRAM) 50 MG tablet 1-2 tablets bid prn 90 tablet 1     No current facility-administered medications for this visit.     Review of Systems   Constitutional: Negative.    Skin:        Painful toenails   Neurological: Positive for numbness.   All other systems reviewed and are  negative.      OBJECTIVE     Vitals:    01/09/23 1358   BP: 115/63   Pulse: 81   Temp: 96.7 °F (35.9 °C)   SpO2: 98%       Lab Results   Component Value Date    HGBA1C 6.60 (H) 10/19/2022       Lab Results   Component Value Date    GLUCOSE 132 (H) 10/19/2022    CALCIUM 10.1 10/19/2022     10/19/2022    K 5.1 10/19/2022    CO2 28.0 10/19/2022    CL 99 10/19/2022    BUN 17 10/19/2022    CREATININE 1.07 10/19/2022    EGFRIFNONA 69 01/14/2022    BCR 15.9 10/19/2022    ANIONGAP 10.0 10/19/2022       Patient seen in no apparent distress.      PHYSICAL EXAM:     Foot/Ankle Exam:       General:   Diabetic Foot Exam Performed    Appearance: elderly    Orientation: AAOx3    Affect: appropriate    Gait: unimpaired    Shoe Gear:  Casual shoes    VASCULAR      Right Foot Vascularity   Normal vascular exam    Dorsalis pedis:  1+  Posterior tibial:  1+  Skin Temperature: cool    Edema Grading:  None  CFT:  < 3 seconds  Pedal Hair Growth:  Present  Varicosities: mild varicosities       Left Foot Vascularity   Normal vascular exam    Dorsalis pedis:  1+  Posterior tibial:  1+  Skin Temperature: cool    Edema Grading:  None  CFT:  < 3 seconds  Pedal Hair Growth:  Present  Varicosities: mild varicosities        NEUROLOGIC     Right Foot Neurologic   Light touch sensation:  Diminished  Vibratory sensation:  Diminished  Hot/Cold sensation: diminished    Protective Sensation using [place] Monofilament:  2     Left Foot Neurologic   Light touch sensation:  Diminished  Vibratory sensation:  Diminished  Hot/cold sensation: diminished    Protective Sensation using [place] Monofilament:  2     MUSCLE STRENGTH     Right Foot Muscle Strength   Foot dorsiflexion:  4  Foot plantar flexion:  4  Foot inversion:  4  Foot eversion:  4     Left Foot Muscle Strength   Foot dorsiflexion:  4  Foot plantar flexion:  4  Foot inversion:  4  Foot eversion:  4     RANGE OF MOTION      Right Foot Range of Motion   Foot and ankle ROM  within normal limits       Left Foot Range of Motion   Foot and ankle ROM within normal limits       DERMATOLOGIC     Right Foot Dermatologic   Skin: skin intact    Nails: onychomycosis, abnormally thick, subungual debris and dystrophic nails    Nails comment:  Toenails 1, 2, 3, 4, and 5     Left Foot Dermatologic   Skin: skin intact    Nails: onychomycosis, abnormally thick, subungual debris, dystrophic nails and ingrown toenail    Nails comment:  Toenails 1, 2, 3, 4, and 5      Diabetic Foot Exam Performed    ASSESSMENT/PLAN     Diagnoses and all orders for this visit:    1. Neuropathy (Primary)    2. Foot pain, bilateral    3. Onychocryptosis    4. Type 2 diabetes mellitus with diabetic polyneuropathy, with long-term current use of insulin (HCC)    5. Diabetic foot (HCC)    6. Onychomycosis        Comprehensive lower extremity examination and evaluation was performed.    Discussed findings and treatment plan including risks, benefits, and treatment options with patient in detail. Patient agreed with treatment plan.    Toenails 1 through 5 bilaterally were debrided in thickness and length and then smoothed with a Dremel Tool.  Tolerated the procedure well without complications.    Diabetic foot exam performed and documented this date, compliant with CQM required standards. Detail of findings as noted in physical exam.  Lower extremity Neurologic exam for diabetic patient performed and documented this date, compliant with PQRS required standards. Detail of findings as noted in physical exam.  Advised patient importance of good routine lower extremity hygiene. Advised patient importance of evaluating for intact skin and pain free nail borders.  Advised patient to use mirror to evaluate plantar/ soles of feet for better visualization. Advised patient monitor and phone office to be seen if any cracking to skin, open lesions, painful nail borders or if nails become elongated prior to next visit. Advised patient  importance of daily cleansing of lower extremities, followed by good skin cream to maintain normal hydration of skin. Also advised patient importance of close daily monitoring of blood sugar. Advised to regulate diet and medications to maintain control of blood sugar in optimal range. Contact primary care provider if difficulties maintaining blood sugar levels.  Advised Patient of presence of Diabetes Mellitus condition.  Advised Patient risk of progression and worsening or improvement, then return of condition.  Will monitor condition for any change in future. Treat with most appropriate treatment pending status of condition.  Counseled and advised patient extensively on nature and ramifications of diabetes. Standard instructions given to patient for good diabetic foot care and maintenance. Advised importance of careful monitoring to avoid break down and complications secondary to diabetes. Advised patient importance of strict maintenance of blood sugar control. Advised patient of possible ominous results from neglect of condition, i.e.: amputation/ loss of digits, feet and legs, or even death.  Patient states understands counseling, will monitor closely, continue good hygiene and routine diabetic foot care. Patient will contact office is questions or problems.      An After Visit Summary was printed and given to the patient at discharge, including (if requested) any available informative/educational handouts regarding diagnosis, treatment, or medications. All questions were answered to patient/family satisfaction. Should symptoms fail to improve or worsen they agree to call or return to clinic or to go to the Emergency Department. Discussed the importance of following up with any needed screening tests/labs/specialist appointments and any requested follow-up recommended by me today. Importance of maintaining follow-up discussed and patient accepts that missed appointments can delay diagnosis and potentially lead  to worsening of conditions.    Return in about 9 weeks (around 3/13/2023) for Toenail Care., or sooner if acute issues arise.    This document has been electronically signed by Wayne Foster DPM on January 9, 2023 14:19 EST

## 2023-01-17 ENCOUNTER — LAB (OUTPATIENT)
Dept: LAB | Facility: HOSPITAL | Age: 73
End: 2023-01-17
Payer: MEDICARE

## 2023-01-17 DIAGNOSIS — Z79.4 TYPE 2 DIABETES MELLITUS WITH HYPERGLYCEMIA, WITH LONG-TERM CURRENT USE OF INSULIN: ICD-10-CM

## 2023-01-17 DIAGNOSIS — I10 PRIMARY HYPERTENSION: ICD-10-CM

## 2023-01-17 DIAGNOSIS — R53.83 OTHER FATIGUE: ICD-10-CM

## 2023-01-17 DIAGNOSIS — E78.2 MIXED HYPERLIPIDEMIA: ICD-10-CM

## 2023-01-17 DIAGNOSIS — E11.65 TYPE 2 DIABETES MELLITUS WITH HYPERGLYCEMIA, WITH LONG-TERM CURRENT USE OF INSULIN: ICD-10-CM

## 2023-01-17 LAB
ALBUMIN SERPL-MCNC: 4.1 G/DL (ref 3.5–5.2)
ALBUMIN/GLOB SERPL: 1.4 G/DL
ALP SERPL-CCNC: 57 U/L (ref 39–117)
ALT SERPL W P-5'-P-CCNC: 20 U/L (ref 1–41)
ANION GAP SERPL CALCULATED.3IONS-SCNC: 10.7 MMOL/L (ref 5–15)
AST SERPL-CCNC: 19 U/L (ref 1–40)
BASOPHILS # BLD AUTO: 0.03 10*3/MM3 (ref 0–0.2)
BASOPHILS NFR BLD AUTO: 0.3 % (ref 0–1.5)
BILIRUB SERPL-MCNC: 0.5 MG/DL (ref 0–1.2)
BUN SERPL-MCNC: 17 MG/DL (ref 8–23)
BUN/CREAT SERPL: 13.9 (ref 7–25)
CALCIUM SPEC-SCNC: 9.7 MG/DL (ref 8.6–10.5)
CHLORIDE SERPL-SCNC: 100 MMOL/L (ref 98–107)
CHOLEST SERPL-MCNC: 129 MG/DL (ref 0–200)
CO2 SERPL-SCNC: 27.3 MMOL/L (ref 22–29)
CREAT SERPL-MCNC: 1.22 MG/DL (ref 0.76–1.27)
DEPRECATED RDW RBC AUTO: 42.3 FL (ref 37–54)
EGFRCR SERPLBLD CKD-EPI 2021: 63.8 ML/MIN/1.73
EOSINOPHIL # BLD AUTO: 0.35 10*3/MM3 (ref 0–0.4)
EOSINOPHIL NFR BLD AUTO: 4 % (ref 0.3–6.2)
ERYTHROCYTE [DISTWIDTH] IN BLOOD BY AUTOMATED COUNT: 12.7 % (ref 12.3–15.4)
GLOBULIN UR ELPH-MCNC: 2.9 GM/DL
GLUCOSE SERPL-MCNC: 106 MG/DL (ref 65–99)
HBA1C MFR BLD: 6.9 % (ref 4.8–5.6)
HCT VFR BLD AUTO: 45.9 % (ref 37.5–51)
HDLC SERPL-MCNC: 34 MG/DL (ref 40–60)
HGB BLD-MCNC: 15.6 G/DL (ref 13–17.7)
IMM GRANULOCYTES # BLD AUTO: 0.02 10*3/MM3 (ref 0–0.05)
IMM GRANULOCYTES NFR BLD AUTO: 0.2 % (ref 0–0.5)
LDLC SERPL CALC-MCNC: 68 MG/DL (ref 0–100)
LDLC/HDLC SERPL: 1.88 {RATIO}
LYMPHOCYTES # BLD AUTO: 3.25 10*3/MM3 (ref 0.7–3.1)
LYMPHOCYTES NFR BLD AUTO: 37.2 % (ref 19.6–45.3)
MCH RBC QN AUTO: 31 PG (ref 26.6–33)
MCHC RBC AUTO-ENTMCNC: 34 G/DL (ref 31.5–35.7)
MCV RBC AUTO: 91.1 FL (ref 79–97)
MONOCYTES # BLD AUTO: 0.89 10*3/MM3 (ref 0.1–0.9)
MONOCYTES NFR BLD AUTO: 10.2 % (ref 5–12)
NEUTROPHILS NFR BLD AUTO: 4.19 10*3/MM3 (ref 1.7–7)
NEUTROPHILS NFR BLD AUTO: 48.1 % (ref 42.7–76)
NRBC BLD AUTO-RTO: 0 /100 WBC (ref 0–0.2)
PLATELET # BLD AUTO: 206 10*3/MM3 (ref 140–450)
PMV BLD AUTO: 11.5 FL (ref 6–12)
POTASSIUM SERPL-SCNC: 4.7 MMOL/L (ref 3.5–5.2)
PROT SERPL-MCNC: 7 G/DL (ref 6–8.5)
RBC # BLD AUTO: 5.04 10*6/MM3 (ref 4.14–5.8)
SODIUM SERPL-SCNC: 138 MMOL/L (ref 136–145)
TRIGL SERPL-MCNC: 156 MG/DL (ref 0–150)
TSH SERPL DL<=0.05 MIU/L-ACNC: 2.33 UIU/ML (ref 0.27–4.2)
VLDLC SERPL-MCNC: 27 MG/DL (ref 5–40)
WBC NRBC COR # BLD: 8.73 10*3/MM3 (ref 3.4–10.8)

## 2023-01-17 PROCEDURE — 80061 LIPID PANEL: CPT

## 2023-01-17 PROCEDURE — 83036 HEMOGLOBIN GLYCOSYLATED A1C: CPT

## 2023-01-17 PROCEDURE — 80053 COMPREHEN METABOLIC PANEL: CPT

## 2023-01-17 PROCEDURE — 84443 ASSAY THYROID STIM HORMONE: CPT

## 2023-01-17 PROCEDURE — 36415 COLL VENOUS BLD VENIPUNCTURE: CPT

## 2023-01-17 PROCEDURE — 85025 COMPLETE CBC W/AUTO DIFF WBC: CPT

## 2023-02-06 ENCOUNTER — OFFICE VISIT (OUTPATIENT)
Dept: INTERNAL MEDICINE | Facility: CLINIC | Age: 73
End: 2023-02-06
Payer: MEDICARE

## 2023-02-06 VITALS
HEIGHT: 66 IN | SYSTOLIC BLOOD PRESSURE: 119 MMHG | DIASTOLIC BLOOD PRESSURE: 70 MMHG | TEMPERATURE: 98.4 F | BODY MASS INDEX: 32.82 KG/M2 | WEIGHT: 204.2 LBS | OXYGEN SATURATION: 98 % | HEART RATE: 82 BPM

## 2023-02-06 DIAGNOSIS — E78.2 MIXED HYPERLIPIDEMIA: Primary | ICD-10-CM

## 2023-02-06 DIAGNOSIS — Z79.4 TYPE 2 DIABETES MELLITUS WITH HYPERGLYCEMIA, WITH LONG-TERM CURRENT USE OF INSULIN: ICD-10-CM

## 2023-02-06 DIAGNOSIS — G89.29 CHRONIC PAIN WITH DRUG DEPENDENCE: ICD-10-CM

## 2023-02-06 DIAGNOSIS — Z23 NEED FOR VACCINATION: ICD-10-CM

## 2023-02-06 DIAGNOSIS — G63 POLYNEUROPATHY ASSOCIATED WITH UNDERLYING DISEASE: ICD-10-CM

## 2023-02-06 DIAGNOSIS — E11.65 TYPE 2 DIABETES MELLITUS WITH HYPERGLYCEMIA, WITH LONG-TERM CURRENT USE OF INSULIN: ICD-10-CM

## 2023-02-06 DIAGNOSIS — I10 PRIMARY HYPERTENSION: ICD-10-CM

## 2023-02-06 DIAGNOSIS — I25.110 UNSTABLE ANGINA PECTORIS DUE TO CORONARY ARTERIOSCLEROSIS: ICD-10-CM

## 2023-02-06 DIAGNOSIS — E53.8 VITAMIN B12 DEFICIENCY: ICD-10-CM

## 2023-02-06 DIAGNOSIS — F19.20 CHRONIC PAIN WITH DRUG DEPENDENCE: ICD-10-CM

## 2023-02-06 PROCEDURE — 99214 OFFICE O/P EST MOD 30 MIN: CPT | Performed by: INTERNAL MEDICINE

## 2023-02-06 PROCEDURE — 90677 PCV20 VACCINE IM: CPT | Performed by: INTERNAL MEDICINE

## 2023-02-06 PROCEDURE — G0009 ADMIN PNEUMOCOCCAL VACCINE: HCPCS | Performed by: INTERNAL MEDICINE

## 2023-02-06 RX ORDER — ATORVASTATIN CALCIUM 40 MG/1
40 TABLET, FILM COATED ORAL DAILY
Qty: 90 TABLET | Refills: 1 | Status: SHIPPED | OUTPATIENT
Start: 2023-02-06

## 2023-02-06 RX ORDER — IPRATROPIUM BROMIDE 42 UG/1
2 SPRAY, METERED NASAL 4 TIMES DAILY
Qty: 15 ML | Refills: 3 | Status: SHIPPED | OUTPATIENT
Start: 2023-02-06

## 2023-02-06 RX ORDER — LORATADINE 10 MG/1
10 TABLET ORAL DAILY
Qty: 90 TABLET | Refills: 1 | Status: SHIPPED | OUTPATIENT
Start: 2023-02-06

## 2023-02-06 RX ORDER — DULAGLUTIDE 1.5 MG/.5ML
1.5 INJECTION, SOLUTION SUBCUTANEOUS WEEKLY
Qty: 6 ML | Refills: 1 | Status: SHIPPED | OUTPATIENT
Start: 2023-02-06 | End: 2023-05-07

## 2023-02-06 RX ORDER — FLUOROURACIL 50 MG/G
CREAM TOPICAL
COMMUNITY
Start: 2023-01-13

## 2023-02-06 RX ORDER — LISINOPRIL AND HYDROCHLOROTHIAZIDE 12.5; 1 MG/1; MG/1
1 TABLET ORAL DAILY
Qty: 90 TABLET | Refills: 1 | Status: SHIPPED | OUTPATIENT
Start: 2023-02-06

## 2023-02-06 RX ORDER — GABAPENTIN 600 MG/1
600 TABLET ORAL 3 TIMES DAILY
Qty: 270 TABLET | Refills: 1 | Status: SHIPPED | OUTPATIENT
Start: 2023-02-06 | End: 2023-02-06

## 2023-02-06 RX ORDER — ASPIRIN 81 MG/1
81 TABLET ORAL DAILY
Qty: 90 TABLET | Refills: 1 | Status: SHIPPED | OUTPATIENT
Start: 2023-02-06

## 2023-02-06 RX ORDER — PANTOPRAZOLE SODIUM 40 MG/1
40 TABLET, DELAYED RELEASE ORAL DAILY
Qty: 90 TABLET | Refills: 1 | Status: SHIPPED | OUTPATIENT
Start: 2023-02-06

## 2023-02-06 RX ORDER — ATENOLOL 50 MG/1
50 TABLET ORAL DAILY
Qty: 90 TABLET | Refills: 1 | Status: SHIPPED | OUTPATIENT
Start: 2023-02-06

## 2023-02-06 RX ORDER — DOCUSATE SODIUM 100 MG/1
100 CAPSULE, LIQUID FILLED ORAL 3 TIMES DAILY
Qty: 270 CAPSULE | Refills: 1 | Status: SHIPPED | OUTPATIENT
Start: 2023-02-06

## 2023-02-06 RX ORDER — GABAPENTIN 600 MG/1
600 TABLET ORAL 3 TIMES DAILY
Qty: 270 TABLET | Refills: 1 | Status: SHIPPED | OUTPATIENT
Start: 2023-02-06

## 2023-02-06 RX ORDER — MULTIPLE VITAMINS W/ MINERALS TAB 9MG-400MCG
1 TAB ORAL DAILY
Qty: 90 TABLET | Refills: 1 | Status: SHIPPED | OUTPATIENT
Start: 2023-02-06

## 2023-02-06 NOTE — ASSESSMENT & PLAN NOTE
Blood pressure well controlled as of his 2/23 office visit.  He will continue with moderate doses of lisinopril/HCT andTenormin.

## 2023-02-06 NOTE — ASSESSMENT & PLAN NOTE
Patient with no ischemia as of 2/23 office visit.  He is maintained on aspirin and atenolol for antianginal regimen, continue same.

## 2023-02-06 NOTE — PROGRESS NOTES
Chief Complaint  Hyperlipidemia, Follow-up (Pt states that this routine, he had labs. ), Right sided abdomen pain (He states that he has mentioned this prior, he doesn't hurt all the time just when he goes to do something and feels that it pulls. ), and Diabetes ([facility] is not going to carry Bydureon any longer but they are carrying Trulicity)    Subjective          Norberto Whiteside presents to [facility] INTER[facility] MEDIC[facility]     Diabetes  He has type 2 diabetes mellitus. No MedicAlert identification noted. The initial diagnosis of diabetes was made 12 Years ago. Hypoglycemia symptoms include mood changes, sleepiness and tremors. Pertinent negatives for hypoglycemia include no confusion, dizziness, headaches, hunger, nervousness/anxiousness, pallor, seizures, speech difficulty or sweats. Associated symptoms include foot paresthesias, polydipsia, polyuria, weakness and weight loss. Pertinent negatives for diabetes include no blurred vision, no chest pain, no fatigue, no foot ulcerations, no polyphagia and no visual change. Pertinent negatives for hypoglycemia complications include no blackouts, no hospitalization, no nocturnal hypoglycemia, no required assistance and no required glucagon injection. Symptoms are stable. Diabetic complications include impotence, peripheral neuropathy and PVD. Pertinent negatives for diabetic complications include no CVA, heart disease, nephropathy or retinopathy. Risk factors for coronary artery disease include dyslipidemia, hypertension and obesity. Current diabetic treatment includes diet, insulin injections and oral agent (dual therapy). He is compliant with treatment most of the time. He is currently taking insulin pre-lunch. Insulin injections are given by patient. Rotation sites for injection include the thighs. His weight is stable. When asked about meal planning, he reported none. He has not had a previous visit with a dietitian. He participates in exercise three  times a week. He monitors blood glucose at home 1-2 x per day. He monitors urine at home <1 x per month. Blood glucose monitoring compliance is fair. There is no change in his home blood glucose trend. His breakfast blood glucose is taken between 8-9 am. His breakfast blood glucose range is generally 130-140 mg/dl. He sees a podiatrist.Eye exam is current.   Neurologic Problem  The patient's primary symptoms include clumsiness, focal sensory loss, focal weakness, a loss of balance, memory loss, near-syncope and weakness. The patient's pertinent negatives include no altered mental status, slurred speech, syncope or visual change. This is a chronic problem. The current episode started more than 1 year ago. The neurological problem developed gradually. The problem has been waxing and waning since onset. There was right-sided and lower extremity focality noted. Associated symptoms include back pain and neck pain. Pertinent negatives include no abdominal pain, auditory change, aura, bladder incontinence, bowel incontinence, chest pain, confusion, diaphoresis, dizziness, fatigue, fever, headaches, light-headedness, nausea, palpitations, shortness of breath, vertigo or vomiting. Past treatments include acetaminophen and medication. The treatment provided mild relief. His past medical history is significant for mood changes.     HPI:  Patient 72-year-old male with underlying diabetes mellitus, hypertension, hyperlipidemia, resultant coronary artery disease with prior stent, who is coming in 2/23 for routine 3-month follow-up.  We will review his labs and make further recommendations at that time.    Review of Systems   Constitutional: Positive for unexpected weight loss. Negative for appetite change, diaphoresis, fatigue and fever.   HENT: Negative for congestion and ear pain.    Eyes: Negative for blurred vision.   Respiratory: Negative for cough, chest tightness, shortness of breath and wheezing.    Cardiovascular:  "Positive for near-syncope. Negative for chest pain, palpitations and leg swelling.   Gastrointestinal: Negative for abdominal pain, bowel incontinence, nausea and vomiting.   Endocrine: Positive for polydipsia and polyuria. Negative for polyphagia.   Genitourinary: Positive for impotence. Negative for difficulty urinating, dysuria, hematuria and urinary incontinence.   Musculoskeletal: Positive for back pain and neck pain. Negative for arthralgias and gait problem.   Skin: Negative for pallor and skin lesions.   Neurological: Positive for tremors, focal weakness, weakness and loss of balance. Negative for dizziness, vertigo, seizures, syncope, speech difficulty, light-headedness, memory problem and confusion.   Psychiatric/Behavioral: Positive for memory loss. Negative for self-injury and depressed mood. The patient is not nervous/anxious.        Objective   Vital Signs:   /70   Pulse 82   Temp 98.4 °F (36.9 °C) (Skin)   Ht 167.6 cm (65.98\")   Wt 92.6 kg (204 lb 3.2 oz)   SpO2 98%   BMI 32.97 kg/m²           Physical Exam  Vitals and nursing note reviewed.   Constitutional:       General: He is not in acute distress.     Appearance: Normal appearance. He is not toxic-appearing.   HENT:      Head: Atraumatic.      Right Ear: External ear normal.      Left Ear: External ear normal.      Nose: Nose normal.      Mouth/Throat:      Mouth: Mucous membranes are moist.   Eyes:      General:         Right eye: No discharge.         Left eye: No discharge.      Extraocular Movements: Extraocular movements intact.      Pupils: Pupils are equal, round, and reactive to light.   Cardiovascular:      Rate and Rhythm: Normal rate and regular rhythm.      Pulses: Normal pulses.      Heart sounds: Normal heart sounds. No murmur heard.    No gallop.   Pulmonary:      Effort: Pulmonary effort is normal. No respiratory distress.      Breath sounds: No wheezing, rhonchi or rales.   Abdominal:      General: There is no " distension.      Palpations: Abdomen is soft. There is no mass.      Tenderness: There is no abdominal tenderness. There is no guarding.   Musculoskeletal:         General: No swelling or tenderness.      Cervical back: No tenderness.      Right lower leg: No edema.      Left lower leg: No edema.   Skin:     General: Skin is warm and dry.      Findings: No rash.   Neurological:      General: No focal deficit present.      Mental Status: He is alert and oriented to person, place, and time. Mental status is at baseline.      Motor: No weakness.      Gait: Gait normal.   Psychiatric:         Mood and Affect: Mood normal.         Thought Content: Thought content normal.          Result Review :   The following data was reviewed by: Moreno Pereira MD on 10/05/2021:                  Assessment and Plan    Diagnoses and all orders for this visit:    1. Mixed hyperlipidemia (Primary)  Assessment & Plan:  LDL of 68 is at goal as of his 2/23 office visit.  Patient stable to continue with moderate dose atorvastatin.      2. Primary hypertension  Assessment & Plan:  Blood pressure well controlled as of his 2/23 office visit.  He will continue with moderate doses of lisinopril/HCT andTenormin.    Orders:  -     Basic Metabolic Panel; Future    3. Type 2 diabetes mellitus with hyperglycemia, with long-term current use of insulin (HCC)  Assessment & Plan:  A1c is up just slightly from 6.6-6.9 as of his 2/23 office visit.  This is certainly very reasonable given the time of the year, so we will continue with 20 of Lantus as well as full doses of Jardiance and metformin.  Of note we need to transition him from Bydureon onto Trulicity given [place] formulary.    Orders:  -     Hemoglobin A1c; Future    4. Polyneuropathy associated with underlying disease (HCC)    5. Chronic pain with drug dependence (HCC)  -     Discontinue: gabapentin (NEURONTIN) 600 MG tablet; Take 1 tablet by mouth 3 (Three) Times a Day.  Dispense: 270 tablet;  Refill: 1  -     gabapentin (NEURONTIN) 600 MG tablet; Take 1 tablet by mouth 3 (Three) Times a Day.  Dispense: 270 tablet; Refill: 1    6. Unstable angina pectoris due to coronary arteriosclerosis (HCC)  Overview:  CAD s/p PTCA '96  He saw Dr. Russell in 11/21 and he recommends stress imaging etc. only if patient having symptoms.     Assessment & Plan:  Patient with no ischemia as of 2/23 office visit.  He is maintained on aspirin and atenolol for antianginal regimen, continue same.      7. Vitamin B12 deficiency  -     Vitamin B12 anemia; Future  -     Folate anemia; Future    8. Need for vaccination    Other orders  -     aspirin 81 MG EC tablet; Take 1 tablet by mouth Daily.  Dispense: 90 tablet; Refill: 1  -     atenolol (TENORMIN) 50 MG tablet; Take 1 tablet by mouth Daily.  Dispense: 90 tablet; Refill: 1  -     atorvastatin (LIPITOR) 40 MG tablet; Take 1 tablet by mouth Daily.  Dispense: 90 tablet; Refill: 1  -     docusate sodium (COLACE) 100 MG capsule; Take 1 capsule by mouth 3 (Three) Times a Day.  Dispense: 270 capsule; Refill: 1  -     empagliflozin (Jardiance) 25 MG tablet tablet; Take 1 tablet by mouth Daily.  Dispense: 90 tablet; Refill: 1  -     Insulin Glargine (LANTUS SOLOSTAR) 100 UNIT/ML injection pen; Inject 20 Units under the skin into the appropriate area as directed Daily for 90 days.  Dispense: 20 mL; Refill: 1  -     loratadine (CLARITIN) 10 MG tablet; Take 1 tablet by mouth Daily.  Dispense: 90 tablet; Refill: 1  -     metFORMIN (GLUCOPHAGE) 1000 MG tablet; Take 1 tablet by mouth 2 (Two) Times a Day With Meals.  Dispense: 180 tablet; Refill: 1  -     multivitamin with minerals tablet tablet; Take 1 tablet by mouth Daily.  Dispense: 90 tablet; Refill: 1  -     pantoprazole (PROTONIX) 40 MG EC tablet; Take 1 tablet by mouth Daily.  Dispense: 90 tablet; Refill: 1  -     lisinopril-hydrochlorothiazide (PRINZIDE,ZESTORETIC) 10-12.5 MG per tablet; Take 1 tablet by mouth Daily. Take 1/2 tablet  daily  Dispense: 90 tablet; Refill: 1  -     Dulaglutide (Trulicity) 1.5 MG/0.5ML solution pen-injector; Inject 1.5 mg under the skin into the appropriate area as directed 1 (One) Time Per Week for 90 days.  Dispense: 6 mL; Refill: 1  -     ipratropium (ATROVENT) 0.06 % nasal spray; 2 sprays into the nostril(s) as directed by provider 4 (Four) Times a Day.  Dispense: 15 mL; Refill: 3  -     Pneumococcal Conjugate Vaccine 20-Valent (PCV20)     --  --  OLDER NOTES:  VISIT 6/21:  ANNUAL MEDICARE WELLNESS PHYSICAL 9/17 = reviewed all forms with pt in office; no new concerns raised.  DM = A1C as below and OPTHO=20/20 and saw them in spring '18.  --  DM 2 ('15) and was started on Tanzem 3 mo ago and low dose amaryl was stopped; needs repeat labs, but FBS still in 280 ballpark; last A1C=9.9; will increase Tanzem before add another agent...down 10.8 to 8.5 past 4 months, so no invokana yet...7.6 is great trend, so no changes 1/18...8.5 again so needs jardiance/etc now since this is despite wt down some at 5/18 OV; also, may lose tanzem he tells me...8.2 is w/o any new agents, wt is still trending down, so will wait until after new year to add another if still in the 8's...8.4 and he will find out which one is covered...8.3 and got on Jardiance, so need to max it out as of 5/19 OV...7.9 is ok for now at least...8.1 and he blames it on his diet around holiday time; maxed out on 3 meds; will use lantus if same on RTO...8.3 and I d/w he needs Lantus now=10U qd and titrate...7.4 is nice drop already 9/20---> 7.1 is better 6/21.  (Micro-alb neg 5/20)  --  CAD s/p PTCA '96 with need for SPECT soonish=been too long it sound like; no sxs at least---> needs eval as of 6/21 for dizzy/weak/feels off;   LIPIDS with LDL 14 and TG's 400, ? lab error; will repeat prior to changes...LDL 43 with TG's < 200 is fine...LDL 37/TG's 300 baseline 12/19...LDL 27, so will lower dose now to 40 mg---> 60 is better 1/21  --  HTN remains well controlled  and ok to lower to 1/2 tab ACEI/HCT if stays low at home.  ?CKD3 = 52% and will get on RTO in '19... >60%... 40% out of the blue 9/19---> 55% holding 6/21.  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  NEUROPATHY is worse 5/20 and I d/w anodyne is an option; worse when active; will use PRN ultram.  OBESITY with BMI 37 ballpark (TSH neg 5/18).  --  --  PSA 0.5 (10/19/2022)   COLON 1/12 = 2 polyps = 5 yrs per Dr Meade.  Pneumovax x1 ; Prevnar 9/17.  ( 10/21 after 32 years of marriage and she was 10 yrs older = 81 when she passeed, Sun had severe dementia at the end; retired  and then  and retired '14, 1 girl in town)    Follow Up   No follow-ups on file.  Patient was given instructions and counseling regarding his condition or for health maintenance advice. Please see specific information pulled into the AVS if appropriate.

## 2023-02-06 NOTE — ASSESSMENT & PLAN NOTE
A1c is up just slightly from 6.6-6.9 as of his 2/23 office visit.  This is certainly very reasonable given the time of the year, so we will continue with 20 of Lantus as well as full doses of Jardiance and metformin.  Of note we need to transition him from Bydureon onto Trulicity given [place] formulary.

## 2023-02-06 NOTE — ASSESSMENT & PLAN NOTE
LDL of 68 is at goal as of his 2/23 office visit.  Patient stable to continue with moderate dose atorvastatin.

## 2023-03-20 ENCOUNTER — OFFICE VISIT (OUTPATIENT)
Dept: PODIATRY | Facility: CLINIC | Age: 73
End: 2023-03-20
Payer: MEDICARE

## 2023-03-20 VITALS
DIASTOLIC BLOOD PRESSURE: 81 MMHG | HEIGHT: 66 IN | TEMPERATURE: 97.3 F | SYSTOLIC BLOOD PRESSURE: 150 MMHG | HEART RATE: 85 BPM | OXYGEN SATURATION: 96 % | WEIGHT: 207 LBS | BODY MASS INDEX: 33.27 KG/M2

## 2023-03-20 DIAGNOSIS — L60.0 ONYCHOCRYPTOSIS: ICD-10-CM

## 2023-03-20 DIAGNOSIS — M79.672 FOOT PAIN, BILATERAL: ICD-10-CM

## 2023-03-20 DIAGNOSIS — B35.1 ONYCHOMYCOSIS: ICD-10-CM

## 2023-03-20 DIAGNOSIS — M79.671 FOOT PAIN, BILATERAL: ICD-10-CM

## 2023-03-20 DIAGNOSIS — E11.8 DIABETIC FOOT: ICD-10-CM

## 2023-03-20 DIAGNOSIS — E11.42 TYPE 2 DIABETES MELLITUS WITH DIABETIC POLYNEUROPATHY, WITH LONG-TERM CURRENT USE OF INSULIN: ICD-10-CM

## 2023-03-20 DIAGNOSIS — Z79.4 TYPE 2 DIABETES MELLITUS WITH DIABETIC POLYNEUROPATHY, WITH LONG-TERM CURRENT USE OF INSULIN: ICD-10-CM

## 2023-03-20 DIAGNOSIS — G62.9 NEUROPATHY: Primary | ICD-10-CM

## 2023-03-20 PROCEDURE — 3077F SYST BP >= 140 MM HG: CPT | Performed by: PODIATRIST

## 2023-03-20 PROCEDURE — 1160F RVW MEDS BY RX/DR IN RCRD: CPT | Performed by: PODIATRIST

## 2023-03-20 PROCEDURE — 3079F DIAST BP 80-89 MM HG: CPT | Performed by: PODIATRIST

## 2023-03-20 PROCEDURE — 11721 DEBRIDE NAIL 6 OR MORE: CPT | Performed by: PODIATRIST

## 2023-03-20 PROCEDURE — 1159F MED LIST DOCD IN RCRD: CPT | Performed by: PODIATRIST

## 2023-03-20 PROCEDURE — G8404 LOW EXTEMITY NEUR EXAM DOCUM: HCPCS | Performed by: PODIATRIST

## 2023-03-20 NOTE — PROGRESS NOTES
[facility] - PODIATRY    Today's Date: 23    Patient Name: Norberto Whiteside  MRN: [id number]  CSN: [id number]  PCP: Moreno Pereira MD, Last PCP Visit: 2023  Referring Provider: No ref. provider found    SUBJECTIVE     Chief Complaint   Patient presents with   • Left Foot - Follow-up, Nail Problem   • Right Foot - Follow-up, Nail Problem     HPI: Norberto Whiteside, a 72 y.o.male, comes to clinic.    New, Established, New Problem:  est    Location:  Toenails    Duration:   Greater than five years    Onset:  Gradual    Nature:  sore with palpation.    Stable, worsening, improving:   Stable    Aggravating factors:  Pain with shoe gear and ambulation.    Previous Treatment:  debridement    Patient reported last blood glucose: 134    Medical changes: none    Patient denies any fevers, chills, nausea, vomiting, shortness of breathe, nor any other constitutional signs nor symptoms.       Past Medical History:   Diagnosis Date   • Allergic     Seasonal   • Allergies    • Callus    • Cancer (HCC)     skin cancer   • Diabetes mellitus (HCC)    • Hard of hearing    • Hypertension    • Sciatica    • Sleep apnea      Past Surgical History:   Procedure Laterality Date   • ANGIOPLASTY     • COLONOSCOPY N/A 2022    Procedure: COLONOSCOPY with polypectomies;  Surgeon: Kanu Meade MD;  Location: [facility] ENDOSCOPY;  Service: General;  Laterality: N/A;  colon polyps     Family History   Problem Relation Age of Onset   • Stroke Mother    • Cancer Mother    • Cancer Father    • Cancer Sister    • Malig Hyperthermia Neg Hx      Social History     Socioeconomic History   • Marital status:    Tobacco Use   • Smoking status: Former     Packs/day: 1.50     Years: 15.00     Pack years: 22.50     Types: Cigarettes     Start date: 3/1/1979     Quit date: 3/1/1996     Years since quittin.0   • Smokeless tobacco: Never   Vaping Use   • Vaping Use: Never used   Substance and Sexual Activity   • Alcohol  "use: Yes     Comment: rare   • Drug use: Never   • Sexual activity: Defer     No Known Allergies  Current Outpatient Medications   Medication Sig Dispense Refill   • aspirin 81 MG EC tablet Take 1 tablet by mouth Daily. 90 tablet 1   • atenolol (TENORMIN) 50 MG tablet Take 1 tablet by mouth Daily. 90 tablet 1   • atorvastatin (LIPITOR) 40 MG tablet Take 1 tablet by mouth Daily. 90 tablet 1   • docusate sodium (COLACE) 100 MG capsule Take 1 capsule by mouth 3 (Three) Times a Day. 270 capsule 1   • Dulaglutide (Trulicity) 1.5 MG/0.5ML solution pen-injector Inject 1.5 mg under the skin into the appropriate area as directed 1 (One) Time Per Week for 90 days. 6 mL 1   • empagliflozin (Jardiance) 25 MG tablet tablet Take 1 tablet by mouth Daily. 90 tablet 1   • fluorouracil (EFUDEX) 5 % cream      • gabapentin (NEURONTIN) 600 MG tablet Take 1 tablet by mouth 3 (Three) Times a Day. 270 tablet 1   • glucose blood test strip 1 each by Other route 2 (Two) Times a Day. Use as instructed 200 each 3   • Insulin Glargine (LANTUS SOLOSTAR) 100 UNIT/ML injection pen Inject 20 Units under the skin into the appropriate area as directed Daily for 90 days. 20 mL 1   • Insulin Pen Needle (Pen Needles 3/16\") 31G X 5 MM misc 1 kit Daily. 100 each 3   • ipratropium (ATROVENT) 0.06 % nasal spray 2 sprays into the nostril(s) as directed by provider 4 (Four) Times a Day. 15 mL 3   • Lancets misc 1 kit 2 (Two) Times a Day. 180 each 3   • lisinopril-hydrochlorothiazide (PRINZIDE,ZESTORETIC) 10-12.5 MG per tablet Take 1 tablet by mouth Daily. Take 1/2 tablet daily 90 tablet 1   • loratadine (CLARITIN) 10 MG tablet Take 1 tablet by mouth Daily. 90 tablet 1   • metFORMIN (GLUCOPHAGE) 1000 MG tablet Take 1 tablet by mouth 2 (Two) Times a Day With Meals. 180 tablet 1   • multivitamin with minerals tablet tablet Take 1 tablet by mouth Daily. 90 tablet 1   • pantoprazole (PROTONIX) 40 MG EC tablet Take 1 tablet by mouth Daily. 90 tablet 1   • traMADol " (ULTRAM) 50 MG tablet 1-2 tablets bid prn 90 tablet 1     No current facility-administered medications for this visit.     Review of Systems   Constitutional: Negative.    Skin:        Painful toenails   Neurological: Positive for numbness.   All other systems reviewed and are negative.      OBJECTIVE     Vitals:    03/20/23 1033   BP: 150/81   Pulse: 85   Temp: 97.3 °F (36.3 °C)   SpO2: 96%       Lab Results   Component Value Date    HGBA1C 6.90 (H) 01/17/2023       Lab Results   Component Value Date    GLUCOSE 106 (H) 01/17/2023    CALCIUM 9.7 01/17/2023     01/17/2023    K 4.7 01/17/2023    CO2 27.3 01/17/2023     01/17/2023    BUN 17 01/17/2023    CREATININE 1.22 01/17/2023    EGFRIFNONA 69 01/14/2022    BCR 13.9 01/17/2023    ANIONGAP 10.7 01/17/2023       Patient seen in no apparent distress.      PHYSICAL EXAM:     Foot/Ankle Exam:       General:   Diabetic Foot Exam Performed    Appearance: elderly    Orientation: AAOx3    Affect: appropriate    Gait: unimpaired    Shoe Gear:  Casual shoes    VASCULAR      Right Foot Vascularity   Normal vascular exam    Dorsalis pedis:  1+  Posterior tibial:  1+  Skin Temperature: cool    Edema Grading:  None  CFT:  < 3 seconds  Pedal Hair Growth:  Present  Varicosities: mild varicosities       Left Foot Vascularity   Normal vascular exam    Dorsalis pedis:  1+  Posterior tibial:  1+  Skin Temperature: cool    Edema Grading:  None  CFT:  < 3 seconds  Pedal Hair Growth:  Present  Varicosities: mild varicosities        NEUROLOGIC     Right Foot Neurologic   Light touch sensation:  Diminished  Vibratory sensation:  Diminished  Hot/Cold sensation: diminished    Protective Sensation using [place] Monofilament:  2     Left Foot Neurologic   Light touch sensation:  Diminished  Vibratory sensation:  Diminished  Hot/cold sensation: diminished    Protective Sensation using [place] Monofilament:  2     MUSCLE STRENGTH     Right Foot Muscle Strength   Foot  dorsiflexion:  4  Foot plantar flexion:  4  Foot inversion:  4  Foot eversion:  4     Left Foot Muscle Strength   Foot dorsiflexion:  4  Foot plantar flexion:  4  Foot inversion:  4  Foot eversion:  4     RANGE OF MOTION      Right Foot Range of Motion   Foot and ankle ROM within normal limits       Left Foot Range of Motion   Foot and ankle ROM within normal limits       DERMATOLOGIC     Right Foot Dermatologic   Skin: skin intact    Nails: onychomycosis, abnormally thick, subungual debris and dystrophic nails    Nails comment:  Toenails 1, 2, 3, 4, and 5     Left Foot Dermatologic   Skin: skin intact    Nails: onychomycosis, abnormally thick, subungual debris, dystrophic nails and ingrown toenail    Nails comment:  Toenails 1, 2, 3, 4, and 5      Diabetic Foot Exam Performed    ASSESSMENT/PLAN     Diagnoses and all orders for this visit:    1. Neuropathy (Primary)    2. Type 2 diabetes mellitus with diabetic polyneuropathy, with long-term current use of insulin (HCC)    3. Foot pain, bilateral    4. Diabetic foot (HCC)    5. Onychocryptosis    6. Onychomycosis        Comprehensive lower extremity examination and evaluation was performed.    Discussed findings and treatment plan including risks, benefits, and treatment options with patient in detail. Patient agreed with treatment plan.    Toenails 1 through 5 bilaterally were debrided in thickness and length and then smoothed with a Dremel Tool.  Tolerated the procedure well without complications.    Diabetic foot exam performed and documented this date, compliant with CQM required standards. Detail of findings as noted in physical exam.  Lower extremity Neurologic exam for diabetic patient performed and documented this date, compliant with PQRS required standards. Detail of findings as noted in physical exam.  Advised patient importance of good routine lower extremity hygiene. Advised patient importance of evaluating for intact skin and pain free nail borders.   Advised patient to use mirror to evaluate plantar/ soles of feet for better visualization. Advised patient monitor and phone office to be seen if any cracking to skin, open lesions, painful nail borders or if nails become elongated prior to next visit. Advised patient importance of daily cleansing of lower extremities, followed by good skin cream to maintain normal hydration of skin. Also advised patient importance of close daily monitoring of blood sugar. Advised to regulate diet and medications to maintain control of blood sugar in optimal range. Contact primary care provider if difficulties maintaining blood sugar levels.  Advised Patient of presence of Diabetes Mellitus condition.  Advised Patient risk of progression and worsening or improvement, then return of condition.  Will monitor condition for any change in future. Treat with most appropriate treatment pending status of condition.  Counseled and advised patient extensively on nature and ramifications of diabetes. Standard instructions given to patient for good diabetic foot care and maintenance. Advised importance of careful monitoring to avoid break down and complications secondary to diabetes. Advised patient importance of strict maintenance of blood sugar control. Advised patient of possible ominous results from neglect of condition, i.e.: amputation/ loss of digits, feet and legs, or even death.  Patient states understands counseling, will monitor closely, continue good hygiene and routine diabetic foot care. Patient will contact office is questions or problems.      An After Visit Summary was printed and given to the patient at discharge, including (if requested) any available informative/educational handouts regarding diagnosis, treatment, or medications. All questions were answered to patient/family satisfaction. Should symptoms fail to improve or worsen they agree to call or return to clinic or to go to the Emergency Department. Discussed the  importance of following up with any needed screening tests/labs/specialist appointments and any requested follow-up recommended by me today. Importance of maintaining follow-up discussed and patient accepts that missed appointments can delay diagnosis and potentially lead to worsening of conditions.    Return in about 9 weeks (around 5/22/2023) for Toenail Care., or sooner if acute issues arise.    This document has been electronically signed by Wayne Foster DPM on March 20, 2023 10:51 EDT

## 2023-06-02 ENCOUNTER — LAB (OUTPATIENT)
Dept: LAB | Facility: HOSPITAL | Age: 73
End: 2023-06-02
Payer: MEDICARE

## 2023-06-02 DIAGNOSIS — I10 PRIMARY HYPERTENSION: ICD-10-CM

## 2023-06-02 DIAGNOSIS — Z79.4 TYPE 2 DIABETES MELLITUS WITH HYPERGLYCEMIA, WITH LONG-TERM CURRENT USE OF INSULIN: ICD-10-CM

## 2023-06-02 DIAGNOSIS — E53.8 VITAMIN B12 DEFICIENCY: ICD-10-CM

## 2023-06-02 DIAGNOSIS — E11.65 TYPE 2 DIABETES MELLITUS WITH HYPERGLYCEMIA, WITH LONG-TERM CURRENT USE OF INSULIN: ICD-10-CM

## 2023-06-02 LAB
ANION GAP SERPL CALCULATED.3IONS-SCNC: 10.8 MMOL/L (ref 5–15)
BUN SERPL-MCNC: 16 MG/DL (ref 8–23)
BUN/CREAT SERPL: 14.5 (ref 7–25)
CALCIUM SPEC-SCNC: 10.5 MG/DL (ref 8.6–10.5)
CHLORIDE SERPL-SCNC: 99 MMOL/L (ref 98–107)
CO2 SERPL-SCNC: 29.2 MMOL/L (ref 22–29)
CREAT SERPL-MCNC: 1.1 MG/DL (ref 0.76–1.27)
EGFRCR SERPLBLD CKD-EPI 2021: 71.8 ML/MIN/1.73
FOLATE SERPL-MCNC: >20 NG/ML (ref 4.78–24.2)
GLUCOSE SERPL-MCNC: 120 MG/DL (ref 65–99)
HBA1C MFR BLD: 6.7 % (ref 4.8–5.6)
POTASSIUM SERPL-SCNC: 5.3 MMOL/L (ref 3.5–5.2)
SODIUM SERPL-SCNC: 139 MMOL/L (ref 136–145)
VIT B12 BLD-MCNC: 962 PG/ML (ref 211–946)

## 2023-06-02 PROCEDURE — 82746 ASSAY OF FOLIC ACID SERUM: CPT

## 2023-06-02 PROCEDURE — 83036 HEMOGLOBIN GLYCOSYLATED A1C: CPT

## 2023-06-02 PROCEDURE — 36415 COLL VENOUS BLD VENIPUNCTURE: CPT

## 2023-06-02 PROCEDURE — 82607 VITAMIN B-12: CPT

## 2023-06-02 PROCEDURE — 80048 BASIC METABOLIC PNL TOTAL CA: CPT

## 2023-06-07 PROBLEM — E66.812 CLASS 2 SEVERE OBESITY DUE TO EXCESS CALORIES WITH SERIOUS COMORBIDITY AND BODY MASS INDEX (BMI) OF 38.0 TO 38.9 IN ADULT: Status: RESOLVED | Noted: 2021-10-03 | Resolved: 2023-06-07

## 2023-06-07 PROBLEM — E66.01 CLASS 2 SEVERE OBESITY DUE TO EXCESS CALORIES WITH SERIOUS COMORBIDITY AND BODY MASS INDEX (BMI) OF 38.0 TO 38.9 IN ADULT: Status: RESOLVED | Noted: 2021-10-03 | Resolved: 2023-06-07

## 2023-06-07 PROBLEM — Z00.00 MEDICARE ANNUAL WELLNESS VISIT, SUBSEQUENT: Status: ACTIVE | Noted: 2023-06-07

## 2023-06-08 ENCOUNTER — OFFICE VISIT (OUTPATIENT)
Dept: INTERNAL MEDICINE | Facility: CLINIC | Age: 73
End: 2023-06-08
Payer: MEDICARE

## 2023-06-08 VITALS
SYSTOLIC BLOOD PRESSURE: 100 MMHG | DIASTOLIC BLOOD PRESSURE: 60 MMHG | TEMPERATURE: 98.2 F | BODY MASS INDEX: 32.3 KG/M2 | HEART RATE: 76 BPM | HEIGHT: 66 IN | OXYGEN SATURATION: 97 % | WEIGHT: 201 LBS

## 2023-06-08 DIAGNOSIS — G63 POLYNEUROPATHY ASSOCIATED WITH UNDERLYING DISEASE: ICD-10-CM

## 2023-06-08 DIAGNOSIS — E78.2 MIXED HYPERLIPIDEMIA: ICD-10-CM

## 2023-06-08 DIAGNOSIS — Z51.81 ENCOUNTER FOR MEDICATION MONITORING: ICD-10-CM

## 2023-06-08 DIAGNOSIS — F19.20 CHRONIC PAIN WITH DRUG DEPENDENCE: ICD-10-CM

## 2023-06-08 DIAGNOSIS — G89.29 CHRONIC PAIN WITH DRUG DEPENDENCE: ICD-10-CM

## 2023-06-08 DIAGNOSIS — Z00.00 MEDICARE ANNUAL WELLNESS VISIT, SUBSEQUENT: Primary | ICD-10-CM

## 2023-06-08 DIAGNOSIS — Z79.4 TYPE 2 DIABETES MELLITUS WITH HYPERGLYCEMIA, WITH LONG-TERM CURRENT USE OF INSULIN: ICD-10-CM

## 2023-06-08 DIAGNOSIS — I10 PRIMARY HYPERTENSION: ICD-10-CM

## 2023-06-08 DIAGNOSIS — E11.65 TYPE 2 DIABETES MELLITUS WITH HYPERGLYCEMIA, WITH LONG-TERM CURRENT USE OF INSULIN: ICD-10-CM

## 2023-06-08 RX ORDER — DULAGLUTIDE 3 MG/.5ML
3 INJECTION, SOLUTION SUBCUTANEOUS WEEKLY
Qty: 6 ML | Refills: 1 | Status: SHIPPED | OUTPATIENT
Start: 2023-06-08

## 2023-06-08 RX ORDER — TRIAMCINOLONE ACETONIDE 1 MG/G
1 CREAM TOPICAL 2 TIMES DAILY
Qty: 30 G | Refills: 1 | Status: SHIPPED | OUTPATIENT
Start: 2023-06-08

## 2023-06-08 RX ORDER — DULAGLUTIDE 1.5 MG/.5ML
INJECTION, SOLUTION SUBCUTANEOUS
COMMUNITY
Start: 2023-05-28 | End: 2023-06-08 | Stop reason: SDUPTHER

## 2023-06-08 RX ORDER — PREGABALIN 150 MG/1
150 CAPSULE ORAL 2 TIMES DAILY
Qty: 180 CAPSULE | Refills: 1 | Status: SHIPPED | OUTPATIENT
Start: 2023-06-08

## 2023-06-08 RX ORDER — ATENOLOL 50 MG/1
50 TABLET ORAL DAILY
Qty: 90 TABLET | Refills: 1 | Status: SHIPPED | OUTPATIENT
Start: 2023-06-08

## 2023-06-08 RX ORDER — LISINOPRIL AND HYDROCHLOROTHIAZIDE 12.5; 1 MG/1; MG/1
1 TABLET ORAL DAILY
Qty: 90 TABLET | Refills: 1 | Status: SHIPPED | OUTPATIENT
Start: 2023-06-08

## 2023-06-08 RX ORDER — PANTOPRAZOLE SODIUM 40 MG/1
40 TABLET, DELAYED RELEASE ORAL DAILY
Qty: 90 TABLET | Refills: 1 | Status: SHIPPED | OUTPATIENT
Start: 2023-06-08

## 2023-06-08 RX ORDER — ASPIRIN 81 MG/1
81 TABLET ORAL DAILY
Qty: 90 TABLET | Refills: 1 | Status: SHIPPED | OUTPATIENT
Start: 2023-06-08

## 2023-06-08 RX ORDER — LORATADINE 10 MG/1
10 TABLET ORAL DAILY
Qty: 90 TABLET | Refills: 1 | Status: SHIPPED | OUTPATIENT
Start: 2023-06-08

## 2023-06-08 RX ORDER — DOCUSATE SODIUM 100 MG/1
100 CAPSULE, LIQUID FILLED ORAL 3 TIMES DAILY
Qty: 270 CAPSULE | Refills: 1 | Status: SHIPPED | OUTPATIENT
Start: 2023-06-08

## 2023-06-08 RX ORDER — TRAMADOL HYDROCHLORIDE 50 MG/1
TABLET ORAL
Qty: 90 TABLET | Refills: 1 | Status: SHIPPED | OUTPATIENT
Start: 2023-06-08

## 2023-06-08 RX ORDER — DULAGLUTIDE 1.5 MG/.5ML
0.5 INJECTION, SOLUTION SUBCUTANEOUS
Qty: 3 ML | Refills: 1 | Status: CANCELLED | OUTPATIENT
Start: 2023-06-08

## 2023-06-08 RX ORDER — ATORVASTATIN CALCIUM 40 MG/1
40 TABLET, FILM COATED ORAL DAILY
Qty: 90 TABLET | Refills: 1 | Status: SHIPPED | OUTPATIENT
Start: 2023-06-08

## 2023-06-08 NOTE — ASSESSMENT & PLAN NOTE
A1c is very stable at 6.7 as of 6/23 office visit.  He is stable to continue with 20 units of Lantus as well as full doses of Jardiance and metformin.  He was recently switched from Bydureon to Trulicity, he is tolerating current 1.5 mg dose, so we will look at titrating this to see if it will help with his weight and may be allow us to back off some on the insulin.

## 2023-06-08 NOTE — ASSESSMENT & PLAN NOTE
BRODERICK completed 6/23.  Patient remains active and independent.  He did have 1 accidental fall on a step, but no trauma.  No hospitalizations in the past year.  We will get us a copy of his living will in the near future.

## 2023-06-08 NOTE — PROGRESS NOTES
"The ABCs of the Annual Wellness Visit  Subsequent Medicare Wellness Visit    Subjective      Norberto Whiteside is a 73 y.o. male who presents for a Subsequent Medicare Wellness Visit.    The following portions of the patient's history were reviewed and   updated as appropriate: allergies, current medications, past family history, past medical history, past social history, past surgical history, and problem list.    Compared to one year ago, the patient feels his physical   health is the same.    Compared to one year ago, the patient feels his mental   health is the same.    Recent Hospitalizations:  He was not admitted to the hospital during the last year.       Current Medical Providers:  Patient Care Team:  Moreno Pereira MD as PCP - General (Internal Medicine)    Outpatient Medications Prior to Visit   Medication Sig Dispense Refill    fluorouracil (EFUDEX) 5 % cream       gabapentin (NEURONTIN) 600 MG tablet Take 1 tablet by mouth 3 (Three) Times a Day. 270 tablet 1    glucose blood test strip 1 each by Other route 2 (Two) Times a Day. Use as instructed 200 each 3    Insulin Pen Needle (Pen Needles 3/16\") 31G X 5 MM misc 1 kit Daily. 100 each 3    ipratropium (ATROVENT) 0.06 % nasal spray 2 sprays into the nostril(s) as directed by provider 4 (Four) Times a Day. 15 mL 3    Lancets misc 1 kit 2 (Two) Times a Day. 180 each 3    multivitamin with minerals tablet tablet Take 1 tablet by mouth Daily. 90 tablet 1    aspirin 81 MG EC tablet Take 1 tablet by mouth Daily. 90 tablet 1    atenolol (TENORMIN) 50 MG tablet Take 1 tablet by mouth Daily. 90 tablet 1    atorvastatin (LIPITOR) 40 MG tablet Take 1 tablet by mouth Daily. 90 tablet 1    docusate sodium (COLACE) 100 MG capsule Take 1 capsule by mouth 3 (Three) Times a Day. 270 capsule 1    empagliflozin (Jardiance) 25 MG tablet tablet Take 1 tablet by mouth Daily. 90 tablet 1    Insulin Glargine (LANTUS SOLOSTAR) 100 UNIT/ML injection pen Inject 20 Units under the skin " into the appropriate area as directed Daily for 90 days. 20 mL 1    lisinopril-hydrochlorothiazide (PRINZIDE,ZESTORETIC) 10-12.5 MG per tablet Take 1 tablet by mouth Daily. Take 1/2 tablet daily 90 tablet 1    loratadine (CLARITIN) 10 MG tablet Take 1 tablet by mouth Daily. 90 tablet 1    metFORMIN (GLUCOPHAGE) 1000 MG tablet Take 1 tablet by mouth 2 (Two) Times a Day With Meals. 180 tablet 1    pantoprazole (PROTONIX) 40 MG EC tablet Take 1 tablet by mouth Daily. 90 tablet 1    traMADol (ULTRAM) 50 MG tablet 1-2 tablets bid prn 90 tablet 1    Trulicity 1.5 MG/0.5ML solution pen-injector        No facility-administered medications prior to visit.       Opioid medication/s are on active medication list.  and I have evaluated his active treatment plan and pain score trends (see table).  There were no vitals filed for this visit.  I have reviewed the chart for potential of high risk medication and harmful drug interactions in the elderly.          Aspirin is on active medication list. Aspirin use is indicated based on review of current medical condition/s. Pros and cons of this therapy have been discussed today. Benefits of this medication outweigh potential harm.  Patient has been encouraged to continue taking this medication.  .      Patient Active Problem List   Diagnosis    History of colonic polyps    Mixed hyperlipidemia    Unstable angina pectoris due to coronary arteriosclerosis    Type 2 diabetes mellitus with hyperglycemia, with long-term current use of insulin    Primary osteoarthritis involving multiple joints    Primary hypertension    Gastroesophageal reflux disease without esophagitis    Polyneuropathy associated with underlying disease    Medicare annual wellness visit, subsequent     Advance Care Planning   Advance Care Planning     Advance Directive is not on file.  ACP discussion was held with the patient during this visit. Patient has an advance directive (not in EMR), copy requested.     Objective   "  Vitals:    23 1116   BP: 100/60   Pulse: 76   Temp: 98.2 °F (36.8 °C)   TempSrc: Skin   SpO2: 97%   Weight: 91.2 kg (201 lb)   Height: 167.6 cm (65.98\")     Estimated body mass index is 32.46 kg/m² as calculated from the following:    Height as of this encounter: 167.6 cm (65.98\").    Weight as of this encounter: 91.2 kg (201 lb).    BMI is >= 30 and <35. (Class 1 Obesity). The following options were offered after discussion;: exercise counseling/recommendations and nutrition counseling/recommendations      Does the patient have evidence of cognitive impairment?   No    Lab Results   Component Value Date    HGBA1C 6.70 (H) 2023          HEALTH RISK ASSESSMENT    Smoking Status:  Social History     Tobacco Use   Smoking Status Former    Packs/day: 1.50    Years: 15.00    Pack years: 22.50    Types: Cigarettes    Start date: 3/1/1979    Quit date: 3/1/1996    Years since quittin.2   Smokeless Tobacco Never     Alcohol Consumption:  Social History     Substance and Sexual Activity   Alcohol Use Yes    Comment: rare     Fall Risk Screen:    DAVID Fall Risk Assessment was completed, and patient is at HIGH risk for falls. Assessment completed on:2023    Depression Screenin/8/2023    11:22 AM   PHQ-2/PHQ-9 Depression Screening   Little Interest or Pleasure in Doing Things 0-->not at all   Feeling Down, Depressed or Hopeless 0-->not at all   PHQ-9: Brief Depression Severity Measure Score 0       Health Habits and Functional and Cognitive Screenin/8/2023    11:00 AM   Functional & Cognitive Status   Do you have difficulty preparing food and eating? No   Do you have difficulty bathing yourself, getting dressed or grooming yourself? No   Do you have difficulty using the toilet? No   Do you have difficulty moving around from place to place? No   Do you have trouble with steps or getting out of a bed or a chair? No   Current Diet Well Balanced Diet   Do you need help using the phone?  No "   Are you deaf or do you have serious difficulty hearing?  No   Do you need help with transportation? No   Do you need help shopping? No   Do you need help preparing meals?  No   Do you need help with housework?  No   Do you need help with laundry? No   Do you need help taking your medications? No   Do you need help managing money? No   Do you ever drive or ride in a car without wearing a seat belt? No   Do you have difficulty concentrating, remembering or making decisions? No       Age-appropriate Screening Schedule:  Refer to the list below for future screening recommendations based on patient's age, sex and/or medical conditions. Orders for these recommended tests are listed in the plan section. The patient has been provided with a written plan.    Health Maintenance   Topic Date Due    ANNUAL WELLNESS VISIT  07/02/2021    TDAP/TD VACCINES (1 - Tdap) 07/26/2022    COVID-19 Vaccine (5 - Pfizer series) 09/04/2023 (Originally 2/25/2023)    INFLUENZA VACCINE  08/01/2023    DIABETIC EYE EXAM  08/17/2023    URINE MICROALBUMIN  10/19/2023    HEMOGLOBIN A1C  12/02/2023    LIPID PANEL  01/17/2024    DIABETIC FOOT EXAM  03/20/2024    COLORECTAL CANCER SCREENING  01/12/2027    HEPATITIS C SCREENING  Completed    Pneumococcal Vaccine 65+  Completed    AAA SCREEN (ONE-TIME)  Completed    ZOSTER VACCINE  Completed                  CMS Preventative Services Quick Reference  Risk Factors Identified During Encounter:    None Identified    The above risks/problems have been discussed with the patient.  Pertinent information has been shared with the patient in the After Visit Summary.    Diagnoses and all orders for this visit:    1. Medicare annual wellness visit, subsequent (Primary)  Assessment & Plan:  AWV completed 6/23.  Patient remains active and independent.  He did have 1 accidental fall on a step, but no trauma.  No hospitalizations in the past year.  We will get us a copy of his living will in the near future.      2.  Type 2 diabetes mellitus with hyperglycemia, with long-term current use of insulin  Assessment & Plan:  A1c is very stable at 6.7 as of 6/23 office visit.  He is stable to continue with 20 units of Lantus as well as full doses of Jardiance and metformin.  He was recently switched from Bydureon to Trulicity, he is tolerating current 1.5 mg dose, so we will look at titrating this to see if it will help with his weight and may be allow us to back off some on the insulin.    Orders:  -     Hemoglobin A1c; Future    3. Primary hypertension  Assessment & Plan:  Blood pressure well controlled as of his 6/23 office visit.  He will continue with moderate doses of lisinopril/HCT andTenormin.     Orders:  -     Comprehensive Metabolic Panel; Future    4. Mixed hyperlipidemia  Assessment & Plan:  Patient stable to continue moderate dose Lipitor for now, will get labs on return to office in 4 months, they were fine just 4 months ago as of 6/23.    Orders:  -     Lipid Panel; Future    5. Chronic pain with drug dependence  -     traMADol (ULTRAM) 50 MG tablet; 1-2 tablets bid prn  Dispense: 90 tablet; Refill: 1    6. Encounter for medication monitoring  -     Urine Drug Screen - Urine, Clean Catch; Future    Other orders  -     aspirin 81 MG EC tablet; Take 1 tablet by mouth Daily.  Dispense: 90 tablet; Refill: 1  -     atenolol (TENORMIN) 50 MG tablet; Take 1 tablet by mouth Daily.  Dispense: 90 tablet; Refill: 1  -     atorvastatin (LIPITOR) 40 MG tablet; Take 1 tablet by mouth Daily.  Dispense: 90 tablet; Refill: 1  -     docusate sodium (COLACE) 100 MG capsule; Take 1 capsule by mouth 3 (Three) Times a Day.  Dispense: 270 capsule; Refill: 1  -     empagliflozin (Jardiance) 25 MG tablet tablet; Take 1 tablet by mouth Daily.  Dispense: 90 tablet; Refill: 1  -     Insulin Glargine (LANTUS SOLOSTAR) 100 UNIT/ML injection pen; Inject 20 Units under the skin into the appropriate area as directed Daily for 200 days.  Dispense: 20  mL; Refill: 1  -     lisinopril-hydrochlorothiazide (PRINZIDE,ZESTORETIC) 10-12.5 MG per tablet; Take 1 tablet by mouth Daily. Take 1/2 tablet daily  Dispense: 90 tablet; Refill: 1  -     loratadine (CLARITIN) 10 MG tablet; Take 1 tablet by mouth Daily.  Dispense: 90 tablet; Refill: 1  -     metFORMIN (GLUCOPHAGE) 1000 MG tablet; Take 1 tablet by mouth 2 (Two) Times a Day With Meals.  Dispense: 180 tablet; Refill: 1  -     pantoprazole (PROTONIX) 40 MG EC tablet; Take 1 tablet by mouth Daily.  Dispense: 90 tablet; Refill: 1  -     triamcinolone (KENALOG) 0.1 % cream; Apply 1 application topically to the appropriate area as directed 2 (Two) Times a Day.  Dispense: 30 g; Refill: 1  -     Dulaglutide (Trulicity) 3 MG/0.5ML solution pen-injector; Inject 0.5 mL under the skin into the appropriate area as directed 1 (One) Time Per Week.  Dispense: 6 mL; Refill: 1        Follow Up:   Next Medicare Wellness visit to be scheduled in 1 year.      An After Visit Summary and PPPS were made available to the patient.

## 2023-06-08 NOTE — ASSESSMENT & PLAN NOTE
Blood pressure well controlled as of his 6/23 office visit.  He will continue with moderate doses of lisinopril/HCT andTenormin.

## 2023-06-08 NOTE — PROGRESS NOTES
"Chief Complaint  Hyperlipidemia, Follow-up (Pt states that this is routine, he had labs. ), and Bites from ticks (Pt states that he has had several bites and he has gotten several off that was just crawling)    Subjective          Norberto Whiteside presents to [facility]     HPI:  Patient 73-year-old male with underlying diabetes mellitus, hypertension, hyperlipidemia, resultant coronary artery disease with prior stent, who is coming in 6/23 for routine 3-month follow-up.  We will review his labs and make further recommendations at that time.    Review of Systems   Constitutional:  Negative for appetite change, fatigue and fever.   HENT:  Negative for congestion and ear pain.    Eyes:  Negative for blurred vision.   Respiratory:  Negative for cough, chest tightness, shortness of breath and wheezing.    Cardiovascular:  Negative for chest pain, palpitations and leg swelling.   Gastrointestinal:  Negative for abdominal pain.   Genitourinary:  Negative for difficulty urinating, dysuria and hematuria.   Musculoskeletal:  Negative for arthralgias and gait problem.   Skin:  Negative for skin lesions.   Neurological:  Negative for syncope, memory problem and confusion.   Psychiatric/Behavioral:  Negative for self-injury and depressed mood.      Objective   Vital Signs:   /60   Pulse 76   Temp 98.2 °F (36.8 °C) (Skin)   Ht 167.6 cm (65.98\")   Wt 91.2 kg (201 lb)   SpO2 97%   BMI 32.46 kg/m²           Physical Exam  Vitals and nursing note reviewed.   Constitutional:       General: He is not in acute distress.     Appearance: Normal appearance. He is not toxic-appearing.   HENT:      Head: Atraumatic.      Right Ear: External ear normal.      Left Ear: External ear normal.      Nose: Nose normal.      Mouth/Throat:      Mouth: Mucous membranes are moist.   Eyes:      General:         Right eye: No discharge.         Left eye: No discharge.      Extraocular Movements: Extraocular " movements intact.      Pupils: Pupils are equal, round, and reactive to light.   Cardiovascular:      Rate and Rhythm: Normal rate and regular rhythm.      Pulses: Normal pulses.      Heart sounds: Normal heart sounds. No murmur heard.    No gallop.   Pulmonary:      Effort: Pulmonary effort is normal. No respiratory distress.      Breath sounds: No wheezing, rhonchi or rales.   Abdominal:      General: There is no distension.      Palpations: Abdomen is soft. There is no mass.      Tenderness: There is no abdominal tenderness. There is no guarding.   Musculoskeletal:         General: No swelling or tenderness.      Cervical back: No tenderness.      Right lower leg: No edema.      Left lower leg: No edema.   Skin:     General: Skin is warm and dry.      Findings: No rash.   Neurological:      General: No focal deficit present.      Mental Status: He is alert and oriented to person, place, and time. Mental status is at baseline.      Motor: No weakness.      Gait: Gait normal.   Psychiatric:         Mood and Affect: Mood normal.         Thought Content: Thought content normal.        Result Review :   The following data was reviewed by: Moreno Pereira MD on 10/05/2021:                  Assessment and Plan    Diagnoses and all orders for this visit:    1. Medicare annual wellness visit, subsequent (Primary)  Assessment & Plan:  AWV completed 6/23.  Patient remains active and independent.  He did have 1 accidental fall on a step, but no trauma.  No hospitalizations in the past year.  We will get us a copy of his living will in the near future.      2. Type 2 diabetes mellitus with hyperglycemia, with long-term current use of insulin  Assessment & Plan:  A1c is very stable at 6.7 as of 6/23 office visit.  He is stable to continue with 20 units of Lantus as well as full doses of Jardiance and metformin.  He was recently switched from Bydureon to Trulicity, he is tolerating current 1.5 mg dose, so we will look at  titrating this to see if it will help with his weight and may be allow us to back off some on the insulin.    Orders:  -     Hemoglobin A1c; Future    3. Primary hypertension  Assessment & Plan:  Blood pressure well controlled as of his 6/23 office visit.  He will continue with moderate doses of lisinopril/HCT andTenormin.     Orders:  -     Comprehensive Metabolic Panel; Future    4. Mixed hyperlipidemia  Assessment & Plan:  Patient stable to continue moderate dose Lipitor for now, will get labs on return to office in 4 months, they were fine just 4 months ago as of 6/23.    Orders:  -     Lipid Panel; Future    5. Chronic pain with drug dependence  -     traMADol (ULTRAM) 50 MG tablet; 1-2 tablets bid prn  Dispense: 90 tablet; Refill: 1    6. Encounter for medication monitoring  -     Urine Drug Screen - Urine, Clean Catch; Future    7. Polyneuropathy associated with underlying disease  -     pregabalin (Lyrica) 150 MG capsule; Take 1 capsule by mouth 2 (Two) Times a Day.  Dispense: 180 capsule; Refill: 1    Other orders  -     aspirin 81 MG EC tablet; Take 1 tablet by mouth Daily.  Dispense: 90 tablet; Refill: 1  -     atenolol (TENORMIN) 50 MG tablet; Take 1 tablet by mouth Daily.  Dispense: 90 tablet; Refill: 1  -     atorvastatin (LIPITOR) 40 MG tablet; Take 1 tablet by mouth Daily.  Dispense: 90 tablet; Refill: 1  -     docusate sodium (COLACE) 100 MG capsule; Take 1 capsule by mouth 3 (Three) Times a Day.  Dispense: 270 capsule; Refill: 1  -     empagliflozin (Jardiance) 25 MG tablet tablet; Take 1 tablet by mouth Daily.  Dispense: 90 tablet; Refill: 1  -     Insulin Glargine (LANTUS SOLOSTAR) 100 UNIT/ML injection pen; Inject 20 Units under the skin into the appropriate area as directed Daily for 200 days.  Dispense: 20 mL; Refill: 1  -     lisinopril-hydrochlorothiazide (PRINZIDE,ZESTORETIC) 10-12.5 MG per tablet; Take 1 tablet by mouth Daily. Take 1/2 tablet daily  Dispense: 90 tablet; Refill: 1  -      loratadine (CLARITIN) 10 MG tablet; Take 1 tablet by mouth Daily.  Dispense: 90 tablet; Refill: 1  -     metFORMIN (GLUCOPHAGE) 1000 MG tablet; Take 1 tablet by mouth 2 (Two) Times a Day With Meals.  Dispense: 180 tablet; Refill: 1  -     pantoprazole (PROTONIX) 40 MG EC tablet; Take 1 tablet by mouth Daily.  Dispense: 90 tablet; Refill: 1  -     triamcinolone (KENALOG) 0.1 % cream; Apply 1 application topically to the appropriate area as directed 2 (Two) Times a Day.  Dispense: 30 g; Refill: 1  -     Dulaglutide (Trulicity) 3 MG/0.5ML solution pen-injector; Inject 0.5 mL under the skin into the appropriate area as directed 1 (One) Time Per Week.  Dispense: 6 mL; Refill: 1            --  --  OLDER NOTES:  VISIT 6/21:  ANNUAL MEDICARE WELLNESS PHYSICAL 9/17 = reviewed all forms with pt in office; no new concerns raised.  DM = A1C as below and OPTHO=20/20 and saw them in spring '18.  --  DM 2 ('15) and was started on Tanzem 3 mo ago and low dose amaryl was stopped; needs repeat labs, but FBS still in 280 ballpark; last A1C=9.9; will increase Tanzem before add another agent...down 10.8 to 8.5 past 4 months, so no invokana yet...7.6 is great trend, so no changes 1/18...8.5 again so needs jardiance/etc now since this is despite wt down some at 5/18 OV; also, may lose tanzem he tells me...8.2 is w/o any new agents, wt is still trending down, so will wait until after new year to add another if still in the 8's...8.4 and he will find out which one is covered...8.3 and got on Jardiance, so need to max it out as of 5/19 OV...7.9 is ok for now at least...8.1 and he blames it on his diet around holiday time; maxed out on 3 meds; will use lantus if same on RTO...8.3 and I d/w he needs Lantus now=10U qd and titrate...7.4 is nice drop already 9/20---> 7.1 is better 6/21.  (Micro-alb neg 5/20)  --  CAD s/p PTCA '96 with need for SPECT soonish=been too long it sound like; no sxs at least---> needs eval as of 6/21 for  dizzy/weak/feels off;   LIPIDS with LDL 14 and TG's 400, ? lab error; will repeat prior to changes...LDL 43 with TG's < 200 is fine...LDL 37/TG's 300 baseline 12/19...LDL 27, so will lower dose now to 40 mg---> 60 is better 1/21  --  HTN remains well controlled and ok to lower to 1/2 tab ACEI/HCT if stays low at home.  ?CKD3 = 52% and will get on RTO in '19... >60%... 40% out of the blue 9/19---> 55% holding 6/21.  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  NEUROPATHY is worse 5/20 and I d/w anodyne is an option; worse when active; will use PRN ultram.  OBESITY with BMI 37 ballpark (TSH neg 5/18).  --  --  PSA 0.5 (10/19/2022)   COLON 1/12 = 2 polyps = 5 yrs per Dr Meade.  Pneumovax x1 ; Prevnar 9/17.  ( 10/21 after 32 years of marriage and she was 10 yrs older = 81 when she passeed, Sun had severe dementia at the end; retired  and then  and retired '14, 1 girl in town)    Follow Up   Return in about 4 months (around 10/8/2023).  Patient was given instructions and counseling regarding his condition or for health maintenance advice. Please see specific information pulled into the AVS if appropriate.     Answers submitted by the patient for this visit:  Primary Reason for Visit (Submitted on 6/7/2023)  What is the primary reason for your visit?: Neurological Problem  Neurological Problem Questionnaire (Submitted on 6/7/2023)  Chief Complaint: Neurologic complaint  altered mental status: No  clumsiness: Yes  focal sensory loss: Yes  focal weakness: Yes  loss of balance: Yes  memory loss: No  near-syncope: No  slurred speech: No  visual change: No  Chronicity: recurrent  Onset: more than 1 year ago  Onset quality: gradually  Progression since onset: waxing and waning  Focality: lower extremity  auditory change: Yes  aura: No  bowel incontinence: No  headaches: No  vertigo:  No  Treatments tried: acetaminophen, medication  Improvement on treatment: mild

## 2023-06-08 NOTE — ASSESSMENT & PLAN NOTE
Patient stable to continue moderate dose Lipitor for now, will get labs on return to office in 4 months, they were fine just 4 months ago as of 6/23.

## 2023-06-08 NOTE — PATIENT INSTRUCTIONS
Potassium Content of Foods  Potassium is a mineral found in many foods and drinks. It can affect how the heart works, affect blood pressure, and keep fluids and electrolytes balanced in the body. It is important not to have too much potassium (hyperkalemia) or too little potassium (hypokalemia) in the body, especially in the blood.  Potassium is naturally found in many different types of whole foods, such as fruits, vegetables, meat, and dairy products. Processed foods tend to be lower in potassium. The amount of potassium you need each day depends on your age and any medical conditions you may have. General recommendations are:  Females aged 19 and older: 2,600 mg per day.  Males aged 19 and older: 3,400 mg per day.  Talk with your health care provider or dietitian about how much potassium you need.  What foods are high in potassium?  Below are examples of foods that have greater than 200 mg of potassium per serving.  Fruits  Orange -- 1 medium (130 g) has 230 mg of potassium.  Banana -- 1 medium (120 g) has 420 mg of potassium.  Cantaloupe, chunks -- 1 cup (160 g) has 430 mg of potassium.  Vegetables  Potato, baked, without skin -- 1 medium (170 g) has 600 mg of potassium.  Broccoli, chopped, cooked -- ½ cup (77.5 g) has 230 mg of potassium.  Tomato, chopped or sliced -- 1 cup (152 g) has 400 mg of potassium.  Grains  Cereal, bran with raisins -- 1 cup (59 g) has 360 mg of potassium.  Granola with almonds -- ½ cup (82 g) has 220 mg of potassium.  Meats and other proteins  Ground beef maxim -- 4 ounces (113 g) has 240 mg of potassium.  Kidney beans, boiled -- ½ cup (130 g) has 350 mg of potassium.  Almonds -- 1 ounce (approximately 22 nuts or 28 g) has 200 mg of potassium.  Dairy  Cow's milk, 1% -- 1 cup (237 mL) has 360 mg of potassium.  Plain vanilla low-fat yogurt -- ¾ cup (184 g) has 220 mg of potassium.  The items listed above may not be a complete list of foods high in potassium. Actual amounts of potassium  may be different depending on ripeness, shelf life, and food preparation. Contact a dietitian for more information.  What foods are low in potassium?  Below are examples of foods that have less than 200 mg of potassium per serving.  Fruits  Blueberries -- 1 cup (145 g) has 110 mg of potassium.  Apple -- 1 medium (140 g) has 145 mg of potassium.  Grapes -- 1 cup (160 g) has 175 mg of potassium.  Vegetables  Cabbage, raw -- 1 cup (70 g) has 120 mg of potassium.  Cauliflower, chopped, cooked -- 1 cup (180 g) has 90 mg of potassium.  Eric lettuce, chopped -- 1 cup (56 g) has 120 mg of potassium.  Grains  Bagel, plain -- one 4-inch (10 cm) has 100 mg of potassium.  Whole wheat bread -- 1 slice (26 g) has 70 mg of potassium.  White rice, cooked -- 1 cup (163 g) has 50 mg of potassium.  Meats and other proteins  Tuna, light, canned in water -- 3 ounces (85 g) has 150 mg of potassium.  Egg, fried -- 1 large (50 g) has 60 mg of potassium.  Peanuts --1 ounce (35 nuts or 28 g) has 180 mg of potassium.  Tofu -- ½ cup (252 g) has 150 mg of potassium.  Dairy  Cheese (cheddar, delmy, mozzarella, or provolone) -- 1 ounce (28 g) has 30 to 40 mg of potassium.  The items listed above may not be a complete list of foods that are low in potassium. Actual amounts of potassium may be different depending on ripeness, shelf life, and food preparation. Contact a dietitian for more information.  Summary  Potassium is a mineral found in many foods and drinks. It affects how the heart works, affects blood pressure, and keeps fluids and electrolytes balanced in the body.  The amount of potassium you need each day depends on your age and any existing medical conditions you may have.  Your health care provider or dietitian may recommend an amount of potassium that you should have each day.  This information is not intended to replace advice given to you by your health care provider. Make sure you discuss any questions you have with your health  care provider.  Document Revised: 09/20/2022 Document Reviewed: 09/01/2022  Elsevier Patient Education © 2022 [employer]

## 2023-06-12 ENCOUNTER — OFFICE VISIT (OUTPATIENT)
Dept: PODIATRY | Facility: CLINIC | Age: 73
End: 2023-06-12
Payer: MEDICARE

## 2023-06-12 VITALS
HEIGHT: 66 IN | DIASTOLIC BLOOD PRESSURE: 58 MMHG | SYSTOLIC BLOOD PRESSURE: 120 MMHG | TEMPERATURE: 97.7 F | WEIGHT: 201 LBS | OXYGEN SATURATION: 96 % | BODY MASS INDEX: 32.3 KG/M2 | HEART RATE: 89 BPM

## 2023-06-12 DIAGNOSIS — E11.8 DIABETIC FOOT: Primary | ICD-10-CM

## 2023-06-12 DIAGNOSIS — L60.0 ONYCHOCRYPTOSIS: ICD-10-CM

## 2023-06-12 DIAGNOSIS — M79.672 FOOT PAIN, BILATERAL: ICD-10-CM

## 2023-06-12 DIAGNOSIS — M79.671 FOOT PAIN, BILATERAL: ICD-10-CM

## 2023-06-12 DIAGNOSIS — G62.9 NEUROPATHY: ICD-10-CM

## 2023-06-12 DIAGNOSIS — B35.1 ONYCHOMYCOSIS: ICD-10-CM

## 2023-06-12 DIAGNOSIS — Z79.4 TYPE 2 DIABETES MELLITUS WITH DIABETIC POLYNEUROPATHY, WITH LONG-TERM CURRENT USE OF INSULIN: ICD-10-CM

## 2023-06-12 DIAGNOSIS — E11.42 TYPE 2 DIABETES MELLITUS WITH DIABETIC POLYNEUROPATHY, WITH LONG-TERM CURRENT USE OF INSULIN: ICD-10-CM

## 2023-06-12 NOTE — PROGRESS NOTES
[facility] - PODIATRY    Today's Date: 23    Patient Name: Norberto Whiteside  MRN: [id number]  CSN: [id number]  PCP: Moreno Pereira MD, Last PCP Visit: 2023  Referring Provider: No ref. provider found    SUBJECTIVE     Chief Complaint   Patient presents with    Left Foot - Follow-up, Nail Problem    Right Foot - Follow-up, Nail Problem     HPI: Norberto Whiteside, a 73 y.o.male, comes to clinic.    New, Established, New Problem:  est    Location:  Toenails    Duration:   Greater than five years    Onset:  Gradual    Nature:  sore with palpation.    Stable, worsening, improving:   Stable    Aggravating factors:  Pain with shoe gear and ambulation.    Previous Treatment:  debridement    Patient states their most recent blood glucose reading was 134.    Medical changes: Started on Trulicity.    Patient denies any fevers, chills, nausea, vomiting, shortness of breathe, nor any other constitutional signs nor symptoms.       Past Medical History:   Diagnosis Date    Allergic     Seasonal    Allergies     Callus     Cancer     skin cancer    Diabetes mellitus     Hard of hearing     Hypertension     Sciatica     Sleep apnea      Past Surgical History:   Procedure Laterality Date    1996    COLONOSCOPY N/A 2022    Procedure: COLONOSCOPY with polypectomies;  Surgeon: Kanu Meade MD;  Location: [facility] ENDOSCOPY;  Service: General;  Laterality: N/A;  colon polyps     Family History   Problem Relation Age of Onset    Stroke Mother     Cancer Mother     Cancer Father     Cancer Sister     Malig Hyperthermia Neg Hx      Social History     Socioeconomic History    Marital status:    Tobacco Use    Smoking status: Former     Packs/day: 1.50     Years: 15.00     Pack years: 22.50     Types: Cigarettes     Start date: 3/1/1979     Quit date: 3/1/1996     Years since quittin.2    Smokeless tobacco: Never   Vaping Use    Vaping Use: Never used   Substance and Sexual Activity     "Alcohol use: Yes     Comment: rare    Drug use: Never    Sexual activity: Defer     No Known Allergies  Current Outpatient Medications   Medication Sig Dispense Refill    aspirin 81 MG EC tablet Take 1 tablet by mouth Daily. 90 tablet 1    atenolol (TENORMIN) 50 MG tablet Take 1 tablet by mouth Daily. 90 tablet 1    atorvastatin (LIPITOR) 40 MG tablet Take 1 tablet by mouth Daily. 90 tablet 1    docusate sodium (COLACE) 100 MG capsule Take 1 capsule by mouth 3 (Three) Times a Day. 270 capsule 1    Dulaglutide (Trulicity) 3 MG/0.5ML solution pen-injector Inject 0.5 mL under the skin into the appropriate area as directed 1 (One) Time Per Week. 6 mL 1    empagliflozin (Jardiance) 25 MG tablet tablet Take 1 tablet by mouth Daily. 90 tablet 1    fluorouracil (EFUDEX) 5 % cream       gabapentin (NEURONTIN) 600 MG tablet Take 1 tablet by mouth 3 (Three) Times a Day. 270 tablet 1    glucose blood test strip 1 each by Other route 2 (Two) Times a Day. Use as instructed 200 each 3    Insulin Glargine (LANTUS SOLOSTAR) 100 UNIT/ML injection pen Inject 20 Units under the skin into the appropriate area as directed Daily for 200 days. 20 mL 1    Insulin Pen Needle (Pen Needles 3/16\") 31G X 5 MM misc 1 kit Daily. 100 each 3    ipratropium (ATROVENT) 0.06 % nasal spray 2 sprays into the nostril(s) as directed by provider 4 (Four) Times a Day. 15 mL 3    Lancets misc 1 kit 2 (Two) Times a Day. 180 each 3    lisinopril-hydrochlorothiazide (PRINZIDE,ZESTORETIC) 10-12.5 MG per tablet Take 1 tablet by mouth Daily. Take 1/2 tablet daily 90 tablet 1    loratadine (CLARITIN) 10 MG tablet Take 1 tablet by mouth Daily. 90 tablet 1    metFORMIN (GLUCOPHAGE) 1000 MG tablet Take 1 tablet by mouth 2 (Two) Times a Day With Meals. 180 tablet 1    multivitamin with minerals tablet tablet Take 1 tablet by mouth Daily. 90 tablet 1    pantoprazole (PROTONIX) 40 MG EC tablet Take 1 tablet by mouth Daily. 90 tablet 1    pregabalin (Lyrica) 150 MG capsule " Take 1 capsule by mouth 2 (Two) Times a Day. 180 capsule 1    traMADol (ULTRAM) 50 MG tablet 1-2 tablets bid prn 90 tablet 1    triamcinolone (KENALOG) 0.1 % cream Apply 1 application topically to the appropriate area as directed 2 (Two) Times a Day. 30 g 1     No current facility-administered medications for this visit.     Review of Systems   Constitutional: Negative.    Skin:         Painful toenails   Neurological:  Positive for numbness.   All other systems reviewed and are negative.    OBJECTIVE     Vitals:    06/12/23 1028   BP: 120/58   Pulse: 89   Temp: 97.7 °F (36.5 °C)   SpO2: 96%         Lab Results   Component Value Date    HGBA1C 6.70 (H) 06/02/2023       Lab Results   Component Value Date    GLUCOSE 120 (H) 06/02/2023    CALCIUM 10.5 06/02/2023     06/02/2023    K 5.3 (H) 06/02/2023    CO2 29.2 (H) 06/02/2023    CL 99 06/02/2023    BUN 16 06/02/2023    CREATININE 1.10 06/02/2023    EGFRIFNONA 69 01/14/2022    BCR 14.5 06/02/2023    ANIONGAP 10.8 06/02/2023       Patient seen in no apparent distress.      PHYSICAL EXAM:     Foot/Ankle Exam    GENERAL  Diabetic foot exam performed    Appearance:  elderly  Orientation:  AAOx3  Affect:  appropriate  Gait:  unimpaired  Assistance:  independent  Right shoe gear: casual shoe  Left shoe gear: casual shoe    VASCULAR     Right Foot Vascularity   Dorsalis pedis:  1+  Posterior tibial:  1+  Skin temperature:  warm and cool  Edema grading:  None  CFT:  < 3 seconds  Pedal hair growth:  Present  Varicosities:  mild varicosities     Left Foot Vascularity   Dorsalis pedis:  1+  Posterior tibial:  1+  Skin temperature:  warm and cool  Edema grading:  None  CFT:  < 3 seconds  Pedal hair growth:  Present  Varicosities:  mild varicosities     NEUROLOGIC     Right Foot Neurologic   Light touch sensation: diminished  Vibratory sensation: diminished  Hot/Cold sensation: diminished     Left Foot Neurologic   Light touch sensation: diminished  Vibratory sensation:  diminished  Hot/Cold sensation:  diminished    MUSCLE STRENGTH     Right Foot Muscle Strength   Foot dorsiflexion:  4  Foot plantar flexion:  4  Foot inversion:  4  Foot eversion:  4     Left Foot Muscle Strength   Foot dorsiflexion:  4  Foot plantar flexion:  4  Foot inversion:  4  Foot eversion:  4    RANGE OF MOTION     Right Foot Range of Motion   Foot and ankle ROM within normal limits       Left Foot Range of Motion   Foot and ankle ROM within normal limits      DERMATOLOGIC      Right Foot Dermatologic   Skin  Right foot skin is intact.   Nails  1.  Positive for elongated, onychomycosis, abnormal thickness, subungual debris and ingrown toenail.  2.  Positive for elongated, onychomycosis, abnormal thickness, subungual debris and ingrown toenail.  3.  Positive for elongated, onychomycosis, abnormal thickness, subungual debris and ingrown toenail.  4.  Positive for elongated, onychomycosis, abnormal thickness, subungual debris and ingrown toenail.  5.  Positive for elongated, onychomycosis, abnormal thickness, subungual debris and ingrown toenail.  Nails comment:  Toenails 1, 2, 3, 4, and 5     Left Foot Dermatologic   Skin  Left foot skin is intact.   Nails comment:  Toenails 1, 2, 3, 4, and 5  Nails  1.  Positive for elongated, onychomycosis, abnormal thickness, subungual debris and ingrown toenail.  2.  Positive for elongated, onychomycosis, abnormal thickness, subungual debris and ingrown toenail.  3.  Positive for elongated, onychomycosis, abnormal thickness, subungual debris and ingrown toenail.  4.  Positive for elongated, onychomycosis, abnormally thick, subungual debris and ingrown toenail.  5.  Positive for elongated, onychomycosis, abnormally thick, subungual debris and ingrown toenail.    Diabetic Foot Exam Performed    ASSESSMENT/PLAN     Diagnoses and all orders for this visit:    1. Diabetic foot (Primary)    2. Neuropathy    3. Type 2 diabetes mellitus with diabetic polyneuropathy, with long-term  current use of insulin    4. Onychocryptosis    5. Foot pain, bilateral    6. Onychomycosis        Comprehensive lower extremity examination and evaluation was performed.    Discussed findings and treatment plan including risks, benefits, and treatment options with patient in detail. Patient agreed with treatment plan.    Toenails 1 through 5 bilaterally were debrided in thickness and length and then smoothed with a Dremel Tool.  Tolerated the procedure well without complications.    Diabetic foot exam performed and documented this date, compliant with CQM required standards. Detail of findings as noted in physical exam.  Lower extremity Neurologic exam for diabetic patient performed and documented this date, compliant with PQRS required standards. Detail of findings as noted in physical exam.  Advised patient importance of good routine lower extremity hygiene. Advised patient importance of evaluating for intact skin and pain free nail borders.  Advised patient to use mirror to evaluate plantar/ soles of feet for better visualization. Advised patient monitor and phone office to be seen if any cracking to skin, open lesions, painful nail borders or if nails become elongated prior to next visit. Advised patient importance of daily cleansing of lower extremities, followed by good skin cream to maintain normal hydration of skin. Also advised patient importance of close daily monitoring of blood sugar. Advised to regulate diet and medications to maintain control of blood sugar in optimal range. Contact primary care provider if difficulties maintaining blood sugar levels.  Advised Patient of presence of Diabetes Mellitus condition.  Advised Patient risk of progression and worsening or improvement, then return of condition.  Will monitor condition for any change in future. Treat with most appropriate treatment pending status of condition.  Counseled and advised patient extensively on nature and ramifications of diabetes.  Standard instructions given to patient for good diabetic foot care and maintenance. Advised importance of careful monitoring to avoid break down and complications secondary to diabetes. Advised patient importance of strict maintenance of blood sugar control. Advised patient of possible ominous results from neglect of condition, i.e.: amputation/ loss of digits, feet and legs, or even death.  Patient states understands counseling, will monitor closely, continue good hygiene and routine diabetic foot care. Patient will contact office is questions or problems.      An After Visit Summary was printed and given to the patient at discharge, including (if requested) any available informative/educational handouts regarding diagnosis, treatment, or medications. All questions were answered to patient/family satisfaction. Should symptoms fail to improve or worsen they agree to call or return to clinic or to go to the Emergency Department. Discussed the importance of following up with any needed screening tests/labs/specialist appointments and any requested follow-up recommended by me today. Importance of maintaining follow-up discussed and patient accepts that missed appointments can delay diagnosis and potentially lead to worsening of conditions.    Return in about 9 weeks (around 8/14/2023) for Toenail Care., or sooner if acute issues arise.    This document has been electronically signed by Wayne Foster DPM on June 12, 2023 10:54 EDT

## 2023-09-05 ENCOUNTER — OFFICE VISIT (OUTPATIENT)
Dept: PODIATRY | Facility: CLINIC | Age: 73
End: 2023-09-05
Payer: MEDICARE

## 2023-09-05 VITALS
HEART RATE: 74 BPM | SYSTOLIC BLOOD PRESSURE: 101 MMHG | DIASTOLIC BLOOD PRESSURE: 63 MMHG | BODY MASS INDEX: 31.18 KG/M2 | OXYGEN SATURATION: 93 % | HEIGHT: 66 IN | TEMPERATURE: 96.9 F | WEIGHT: 194 LBS

## 2023-09-05 DIAGNOSIS — E11.42 TYPE 2 DIABETES MELLITUS WITH DIABETIC POLYNEUROPATHY, WITH LONG-TERM CURRENT USE OF INSULIN: ICD-10-CM

## 2023-09-05 DIAGNOSIS — E11.8 DIABETIC FOOT: Primary | ICD-10-CM

## 2023-09-05 DIAGNOSIS — L60.0 ONYCHOCRYPTOSIS: ICD-10-CM

## 2023-09-05 DIAGNOSIS — B35.1 ONYCHOMYCOSIS: ICD-10-CM

## 2023-09-05 DIAGNOSIS — Z79.4 TYPE 2 DIABETES MELLITUS WITH DIABETIC POLYNEUROPATHY, WITH LONG-TERM CURRENT USE OF INSULIN: ICD-10-CM

## 2023-09-05 DIAGNOSIS — M79.672 FOOT PAIN, BILATERAL: ICD-10-CM

## 2023-09-05 DIAGNOSIS — M79.671 FOOT PAIN, BILATERAL: ICD-10-CM

## 2023-09-05 DIAGNOSIS — G62.9 NEUROPATHY: ICD-10-CM

## 2023-09-05 PROCEDURE — 3078F DIAST BP <80 MM HG: CPT | Performed by: PODIATRIST

## 2023-09-05 PROCEDURE — 3074F SYST BP LT 130 MM HG: CPT | Performed by: PODIATRIST

## 2023-09-05 PROCEDURE — 1159F MED LIST DOCD IN RCRD: CPT | Performed by: PODIATRIST

## 2023-09-05 PROCEDURE — 1160F RVW MEDS BY RX/DR IN RCRD: CPT | Performed by: PODIATRIST

## 2023-09-05 PROCEDURE — G8404 LOW EXTEMITY NEUR EXAM DOCUM: HCPCS | Performed by: PODIATRIST

## 2023-09-05 PROCEDURE — 11721 DEBRIDE NAIL 6 OR MORE: CPT | Performed by: PODIATRIST

## 2023-09-05 NOTE — PROGRESS NOTES
[facility] - PODIATRY    Today's Date: 09/05/23    Patient Name: Norberto Whiteside  MRN: [id number]  CSN: [id number]  PCP: Moreno Pereira MD, Last PCP Visit: 8 June 2023  Referring Provider: No ref. provider found    SUBJECTIVE     Chief Complaint   Patient presents with    Left Foot - Follow-up, Nail Problem     Had an episode of feet swelling x 1 day last week     Right Foot - Follow-up, Nail Problem     Had an episode of feet swelling x 1 day last week        HPI: Norberto Whiteside, a 73 y.o.male, comes to clinic.    New, Established, New Problem:  est    Location:  Toenails    Duration:   Greater than five years    Onset:  Gradual    Nature:  sore with palpation.    Stable, worsening, improving:   Stable    Aggravating factors:  Pain with shoe gear and ambulation.    Previous Treatment:  debridement    Patient states their most recent blood glucose reading was 119.    Medical changes: DC Gabapentin, started Lyrica     Patient denies any fevers, chills, nausea, vomiting, shortness of breathe, nor any other constitutional signs nor symptoms.       Past Medical History:   Diagnosis Date    Allergic     Seasonal    Allergies     Callus     Cancer     skin cancer    Diabetes mellitus     Hard of hearing     Hypertension     Sciatica     Sleep apnea      Past Surgical History:   Procedure Laterality Date    ANGIOPLASTY 1996    COLONOSCOPY N/A 1/12/2022    Procedure: COLONOSCOPY with polypectomies;  Surgeon: Kanu Meade MD;  Location: [facility] ENDOSCOPY;  Service: General;  Laterality: N/A;  colon polyps     Family History   Problem Relation Age of Onset    Stroke Mother     Cancer Mother     Cancer Father     Cancer Sister     Malig Hyperthermia Neg Hx      Social History     Socioeconomic History    Marital status:    Tobacco Use    Smoking status: Former     Packs/day: 1.50     Years: 15.00     Pack years: 22.50     Types: Cigarettes     Start date: 3/1/1979     Quit date: 3/1/1996     Years since  "quittin.5    Smokeless tobacco: Never   Vaping Use    Vaping Use: Never used   Substance and Sexual Activity    Alcohol use: Yes     Comment: rare    Drug use: Never    Sexual activity: Defer     No Known Allergies  Current Outpatient Medications   Medication Sig Dispense Refill    aspirin 81 MG EC tablet Take 1 tablet by mouth Daily. 90 tablet 1    atenolol (TENORMIN) 50 MG tablet Take 1 tablet by mouth Daily. 90 tablet 1    atorvastatin (LIPITOR) 40 MG tablet Take 1 tablet by mouth Daily. 90 tablet 1    docusate sodium (COLACE) 100 MG capsule Take 1 capsule by mouth 3 (Three) Times a Day. 270 capsule 1    Dulaglutide (Trulicity) 3 MG/0.5ML solution pen-injector Inject 0.5 mL under the skin into the appropriate area as directed 1 (One) Time Per Week. 6 mL 1    empagliflozin (Jardiance) 25 MG tablet tablet Take 1 tablet by mouth Daily. 90 tablet 1    fluorouracil (EFUDEX) 5 % cream       glucose blood test strip 1 each by Other route 2 (Two) Times a Day. Use as instructed 200 each 3    Insulin Glargine (LANTUS SOLOSTAR) 100 UNIT/ML injection pen Inject 20 Units under the skin into the appropriate area as directed Daily for 200 days. 20 mL 1    Insulin Pen Needle (Pen Needles 3/16\") 31G X 5 MM misc 1 kit Daily. 100 each 3    ipratropium (ATROVENT) 0.06 % nasal spray 2 sprays into the nostril(s) as directed by provider 4 (Four) Times a Day. 15 mL 3    Lancets misc 1 kit 2 (Two) Times a Day. 180 each 3    lisinopril-hydrochlorothiazide (PRINZIDE,ZESTORETIC) 10-12.5 MG per tablet Take 1 tablet by mouth Daily. Take 1/2 tablet daily 90 tablet 1    loratadine (CLARITIN) 10 MG tablet Take 1 tablet by mouth Daily. 90 tablet 1    metFORMIN (GLUCOPHAGE) 1000 MG tablet Take 1 tablet by mouth 2 (Two) Times a Day With Meals. 180 tablet 1    multivitamin with minerals tablet tablet Take 1 tablet by mouth Daily. 90 tablet 1    pantoprazole (PROTONIX) 40 MG EC tablet Take 1 tablet by mouth Daily. 90 tablet 1    pregabalin " (Lyrica) 150 MG capsule Take 1 capsule by mouth 2 (Two) Times a Day. 180 capsule 1    traMADol (ULTRAM) 50 MG tablet 1-2 tablets bid prn 90 tablet 1    triamcinolone (KENALOG) 0.1 % cream Apply 1 application topically to the appropriate area as directed 2 (Two) Times a Day. 30 g 1     No current facility-administered medications for this visit.     Review of Systems   Constitutional: Negative.    Skin:         Painful toenails   Neurological:  Positive for numbness.   All other systems reviewed and are negative.    OBJECTIVE     Vitals:    09/05/23 1023   BP: 101/63   Pulse: 74   Temp: 96.9 °F (36.1 °C)   SpO2: 93%         Lab Results   Component Value Date    HGBA1C 6.70 (H) 06/02/2023       Lab Results   Component Value Date    GLUCOSE 120 (H) 06/02/2023    CALCIUM 10.5 06/02/2023     06/02/2023    K 5.3 (H) 06/02/2023    CO2 29.2 (H) 06/02/2023    CL 99 06/02/2023    BUN 16 06/02/2023    CREATININE 1.10 06/02/2023    EGFRIFNONA 69 01/14/2022    BCR 14.5 06/02/2023    ANIONGAP 10.8 06/02/2023       Patient seen in no apparent distress.      PHYSICAL EXAM:     Foot/Ankle Exam    GENERAL  Appearance:  elderly  Orientation:  AAOx3  Affect:  appropriate  Gait:  unimpaired  Assistance:  independent  Right shoe gear: casual shoe  Left shoe gear: casual shoe    VASCULAR     Right Foot Vascularity   Dorsalis pedis:  1+  Posterior tibial:  1+  Skin temperature:  warm and cool  Edema grading:  None  CFT:  < 3 seconds  Pedal hair growth:  Present  Varicosities:  mild varicosities     Left Foot Vascularity   Dorsalis pedis:  1+  Posterior tibial:  1+  Skin temperature:  warm and cool  Edema grading:  None  CFT:  < 3 seconds  Pedal hair growth:  Present  Varicosities:  mild varicosities     NEUROLOGIC     Right Foot Neurologic   Light touch sensation: diminished  Vibratory sensation: diminished  Hot/Cold sensation: diminished  Protective Sensation using [place]-Keiko Monofilament:   Sites intact: 3  Sites tested:  10     Left Foot Neurologic   Light touch sensation: diminished  Vibratory sensation: diminished  Hot/Cold sensation:  diminished  Protective Sensation using [place]-Keiko Monofilament:   Sites intact: 3  Sites tested: 10    MUSCLE STRENGTH     Right Foot Muscle Strength   Foot dorsiflexion:  4  Foot plantar flexion:  4  Foot inversion:  4  Foot eversion:  4     Left Foot Muscle Strength   Foot dorsiflexion:  4  Foot plantar flexion:  4  Foot inversion:  4  Foot eversion:  4    RANGE OF MOTION     Right Foot Range of Motion   Foot and ankle ROM within normal limits       Left Foot Range of Motion   Foot and ankle ROM within normal limits      DERMATOLOGIC      Right Foot Dermatologic   Skin  Right foot skin is intact.   Nails  1.  Positive for elongated, onychomycosis, abnormal thickness, subungual debris and ingrown toenail.  2.  Positive for elongated, onychomycosis, abnormal thickness, subungual debris and ingrown toenail.  3.  Positive for elongated, onychomycosis, abnormal thickness, subungual debris and ingrown toenail.  4.  Positive for elongated, onychomycosis, abnormal thickness, subungual debris and ingrown toenail.  5.  Positive for elongated, onychomycosis, abnormal thickness, subungual debris and ingrown toenail.  Nails comment:  Toenails 1, 2, 3, 4, and 5     Left Foot Dermatologic   Skin  Left foot skin is intact.   Nails comment:  Toenails 1, 2, 3, 4, and 5  Nails  1.  Positive for elongated, onychomycosis, abnormal thickness, subungual debris and ingrown toenail.  2.  Positive for elongated, onychomycosis, abnormal thickness, subungual debris and ingrown toenail.  3.  Positive for elongated, onychomycosis, abnormal thickness, subungual debris and ingrown toenail.  4.  Positive for elongated, onychomycosis, abnormally thick, subungual debris and ingrown toenail.  5.  Positive for elongated, onychomycosis, abnormally thick, subungual debris and ingrown toenail.    Diabetic Foot Exam Performed and  Monofilament Test Performed      ASSESSMENT/PLAN     Diagnoses and all orders for this visit:    1. Diabetic foot (Primary)    2. Neuropathy    3. Type 2 diabetes mellitus with diabetic polyneuropathy, with long-term current use of insulin    4. Onychocryptosis    5. Onychomycosis    6. Foot pain, bilateral        Comprehensive lower extremity examination and evaluation was performed.    Discussed findings and treatment plan including risks, benefits, and treatment options with patient in detail. Patient agreed with treatment plan.    Toenails 1 through 5 bilaterally were debrided in thickness and length and then smoothed with a Dremel Tool.  Tolerated the procedure well without complications.    Diabetic foot exam performed and documented this date, compliant with CQM required standards. Detail of findings as noted in physical exam.  Lower extremity Neurologic exam for diabetic patient performed and documented this date, compliant with PQRS required standards. Detail of findings as noted in physical exam.  Advised patient importance of good routine lower extremity hygiene. Advised patient importance of evaluating for intact skin and pain free nail borders.  Advised patient to use mirror to evaluate plantar/ soles of feet for better visualization. Advised patient monitor and phone office to be seen if any cracking to skin, open lesions, painful nail borders or if nails become elongated prior to next visit. Advised patient importance of daily cleansing of lower extremities, followed by good skin cream to maintain normal hydration of skin. Also advised patient importance of close daily monitoring of blood sugar. Advised to regulate diet and medications to maintain control of blood sugar in optimal range. Contact primary care provider if difficulties maintaining blood sugar levels.  Advised Patient of presence of Diabetes Mellitus condition.  Advised Patient risk of progression and worsening or improvement, then return  of condition.  Will monitor condition for any change in future. Treat with most appropriate treatment pending status of condition.  Counseled and advised patient extensively on nature and ramifications of diabetes. Standard instructions given to patient for good diabetic foot care and maintenance. Advised importance of careful monitoring to avoid break down and complications secondary to diabetes. Advised patient importance of strict maintenance of blood sugar control. Advised patient of possible ominous results from neglect of condition, i.e.: amputation/ loss of digits, feet and legs, or even death.  Patient states understands counseling, will monitor closely, continue good hygiene and routine diabetic foot care. Patient will contact office is questions or problems.      An After Visit Summary was printed and given to the patient at discharge, including (if requested) any available informative/educational handouts regarding diagnosis, treatment, or medications. All questions were answered to patient/family satisfaction. Should symptoms fail to improve or worsen they agree to call or return to clinic or to go to the Emergency Department. Discussed the importance of following up with any needed screening tests/labs/specialist appointments and any requested follow-up recommended by me today. Importance of maintaining follow-up discussed and patient accepts that missed appointments can delay diagnosis and potentially lead to worsening of conditions.    Return in about 9 weeks (around 11/7/2023) for Toenail Care., or sooner if acute issues arise.    This document has been electronically signed by Wayne Foster DPM on September 5, 2023 10:38 EDT

## 2023-09-30 NOTE — PROGRESS NOTES
"Chief Complaint  Diabetes, Follow-up (Pt states that this is routine, he had labs.He would like to go over recent x-rays and ultrasound as well. ), and Neuropathy in feet (Pt states that the Lyrica is not helping like the Gabapentin did. /He is starting to have Tremors in his hands that he is concerned about. His balance is off, he feels that this goes along with the neuropathy. )    Subjective          Norberto Whiteside presents to [facility]     HPI:  Patient 73-year-old male with underlying diabetes mellitus, hypertension, hyperlipidemia, resultant coronary artery disease with prior stent, who is coming in 10/23 for routine 3-4-month follow-up.  We will review his labs and make further recommendations at that time.    Review of Systems   Constitutional:  Negative for appetite change, fatigue and fever.   HENT:  Negative for congestion and ear pain.    Eyes:  Negative for blurred vision.   Respiratory:  Negative for cough, chest tightness, shortness of breath and wheezing.    Cardiovascular:  Negative for chest pain, palpitations and leg swelling.   Gastrointestinal:  Negative for abdominal pain.   Genitourinary:  Negative for difficulty urinating, dysuria and hematuria.   Musculoskeletal:  Negative for arthralgias and gait problem.   Skin:  Negative for skin lesions.   Neurological:  Negative for syncope, memory problem and confusion.   Psychiatric/Behavioral:  Negative for self-injury and depressed mood.        Objective   Vital Signs:   /60   Pulse 76   Temp 97.8 øF (36.6 øC) (Skin)   Ht 167.6 cm (65.98\")   Wt 90.9 kg (200 lb 6.4 oz)   SpO2 97%   BMI 32.36 kg/mý           Physical Exam  Vitals and nursing note reviewed.   Constitutional:       General: He is not in acute distress.     Appearance: Normal appearance. He is not toxic-appearing.   HENT:      Head: Atraumatic.      Right Ear: External ear normal.      Left Ear: External ear normal.      Nose: Nose normal. "      Mouth/Throat:      Mouth: Mucous membranes are moist.   Eyes:      General:         Right eye: No discharge.         Left eye: No discharge.      Extraocular Movements: Extraocular movements intact.      Pupils: Pupils are equal, round, and reactive to light.   Cardiovascular:      Rate and Rhythm: Normal rate and regular rhythm.      Pulses: Normal pulses.      Heart sounds: Normal heart sounds. No murmur heard.     No gallop.   Pulmonary:      Effort: Pulmonary effort is normal. No respiratory distress.      Breath sounds: No wheezing, rhonchi or rales.   Abdominal:      General: There is no distension.      Palpations: Abdomen is soft. There is no mass.      Tenderness: There is no abdominal tenderness. There is no guarding.   Musculoskeletal:         General: No swelling or tenderness.      Cervical back: No tenderness.      Right lower leg: No edema.      Left lower leg: No edema.   Skin:     General: Skin is warm and dry.      Findings: No rash.   Neurological:      General: No focal deficit present.      Mental Status: He is alert and oriented to person, place, and time. Mental status is at baseline.      Motor: No weakness.      Gait: Gait normal.   Psychiatric:         Mood and Affect: Mood normal.         Thought Content: Thought content normal.          Result Review :   The following data was reviewed by: Moreno Pereira MD on 10/05/2021:                  Assessment and Plan    Diagnoses and all orders for this visit:    1. Type 2 diabetes mellitus with hyperglycemia, with long-term current use of insulin (Primary)  Assessment & Plan:  A1c is down from 6.7 to 6.3 as of his 10/23 office visit.  This was after we can increase his Trulicity to 3 mg.  He is stable on this as well as full dose Jardiance and full dose metformin.  We have him on 20 of Lantus as well, think he could pretty easily backed this off to just 15 units.    Orders:  -     CBC & Differential; Future  -     Hemoglobin A1c; Future  -      TSH; Future  -     T4, free; Future    2. Primary hypertension  Assessment & Plan:  Blood pressure stable and his pulse is in the mid 70s as of his 10/23 office visit.  Patient is to continue with current dosing of medications as being taken at home.  He is going to call and let us know what dose of lisinopril/HCT he is actually taking.  He will continue with the 50 of atenolol as well.    Orders:  -     Basic Metabolic Panel; Future    3. Mixed hyperlipidemia  Assessment & Plan:  LDL 73 as of 10/23 OV, so stable to continue moderate dose Lipitor.      4. Polyneuropathy associated with underlying disease  Assessment & Plan:  Patient's issues are progressing as of 10/23.  His diabetes is under excellent control, thyroid and B12 were normal earlier this year.  He was basically maxed out on gabapentin we switched over to Lyrica and we will titrate this as well.  Referral placed for neurology, additionally getting more imagings studies of his spine, and will have neurosurgery evaluate after those MRIs are completed    Orders:  -     MRI Lumbar Spine Without Contrast; Future  -     MRI Cervical Spine Without Contrast; Future  -     Ambulatory Referral to Neurology  -     Ambulatory Referral to Neurosurgery  -     pregabalin (Lyrica) 200 MG capsule; Take 1 capsule by mouth 2 (Two) Times a Day.  Dispense: 180 capsule; Refill: 1    5. Primary osteoarthritis involving multiple joints  Assessment & Plan:  Patient with progressive back pain.  He is on Lyrica for his neuropathy, we are happy to titrate the Lyrica dose presently.  Additionally he is utilizing tramadol.  Family agrees he is probably not a good surgical candidate, but they would like other options discussed with them.  We are setting up for MRIs of his back and neck, we will get him set up with neurosurgery for further evaluation.      Other orders  -     Insulin Glargine (LANTUS SOLOSTAR) 100 UNIT/ML injection pen; Inject 15 Units under the skin into the  appropriate area as directed Daily for 200 days.  Dispense: 15 mL; Refill: 1  -     Fluzone High-Dose 65+yrs (3653-0876)  -     COVID-19 F23 (Pfizer) 12yrs+ (COMIRNATY)      Total Time Spent:42 minutes     This time includes time spent by me in the following activities: preparing for the visit, reviewing extensive past medical history and tests, performing a medically appropriate examination and/or evaluation, counseling and educating the patient and/or caregivers, ordering medications, tests, or procedures, referring and/or communicating with other health care professionals and documenting information in the medical record all on this date of service.         --  --  OLDER NOTES:  VISIT 6/21:  ANNUAL MEDICARE WELLNESS PHYSICAL 9/17 = reviewed all forms with pt in office; no new concerns raised.  DM = A1C as below and OPTHO=20/20 and saw them in spring '18.  --  DM 2 ('15) and was started on Tanzem 3 mo ago and low dose amaryl was stopped; needs repeat labs, but FBS still in 280 ballpark; last A1C=9.9; will increase Tanzem before add another agent...down 10.8 to 8.5 past 4 months, so no invokana yet...7.6 is great trend, so no changes 1/18...8.5 again so needs jardiance/etc now since this is despite wt down some at 5/18 OV; also, may lose tanzem he tells me...8.2 is w/o any new agents, wt is still trending down, so will wait until after new year to add another if still in the 8's...8.4 and he will find out which one is covered...8.3 and got on Jardiance, so need to max it out as of 5/19 OV...7.9 is ok for now at least...8.1 and he blames it on his diet around holiday time; maxed out on 3 meds; will use lantus if same on RTO...8.3 and I d/w he needs Lantus now=10U qd and titrate...7.4 is nice drop already 9/20---> 7.1 is better 6/21.  (Micro-alb neg 5/20)  --  CAD s/p PTCA '96 with need for SPECT soonish=been too long it sound like; no sxs at least---> needs eval as of 6/21 for dizzy/weak/feels off;   LIPIDS with LDL  14 and TG's 400, ? lab error; will repeat prior to changes...LDL 43 with TG's < 200 is fine...LDL 37/TG's 300 baseline 12/19...LDL 27, so will lower dose now to 40 mg---> 60 is better 1/21  --  HTN remains well controlled and ok to lower to 1/2 tab ACEI/HCT if stays low at home.  ?CKD3 = 52% and will get on RTO in '19... >60%... 40% out of the blue 9/19---> 55% holding 6/21.  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  NEUROPATHY is worse 5/20 and I d/w anodyne is an option; worse when active; will use PRN ultram.  OBESITY with BMI 37 ballpark (TSH neg 5/18).  --  --  PSA 0.5 (10/19/2022)   COLON 1/22 = 2 polyps = 5 yrs per Dr Meade.  Pneumovax x1 ; Prevnar 9/17.  ( 10/21 after 32 years of marriage and she was 10 yrs older = 81 when she Misty nowak had severe dementia at the end; retired  and then  and retired '14, 1 girl in town)    Follow Up   Return in about 3 months (around 1/9/2024).  Patient was given instructions and counseling regarding his condition or for health maintenance advice. Please see specific information pulled into the AVS if appropriate.

## 2023-10-04 ENCOUNTER — LAB (OUTPATIENT)
Dept: LAB | Facility: HOSPITAL | Age: 73
End: 2023-10-04
Payer: MEDICARE

## 2023-10-04 DIAGNOSIS — E78.2 MIXED HYPERLIPIDEMIA: ICD-10-CM

## 2023-10-04 DIAGNOSIS — I10 PRIMARY HYPERTENSION: ICD-10-CM

## 2023-10-04 DIAGNOSIS — E11.65 TYPE 2 DIABETES MELLITUS WITH HYPERGLYCEMIA, WITH LONG-TERM CURRENT USE OF INSULIN: ICD-10-CM

## 2023-10-04 DIAGNOSIS — Z51.81 ENCOUNTER FOR MEDICATION MONITORING: ICD-10-CM

## 2023-10-04 DIAGNOSIS — Z79.4 TYPE 2 DIABETES MELLITUS WITH HYPERGLYCEMIA, WITH LONG-TERM CURRENT USE OF INSULIN: ICD-10-CM

## 2023-10-04 LAB
ALBUMIN SERPL-MCNC: 4.3 G/DL (ref 3.5–5.2)
ALBUMIN/GLOB SERPL: 1.8 G/DL
ALP SERPL-CCNC: 59 U/L (ref 39–117)
ALT SERPL W P-5'-P-CCNC: 15 U/L (ref 1–41)
AMPHET+METHAMPHET UR QL: NEGATIVE
ANION GAP SERPL CALCULATED.3IONS-SCNC: 9.5 MMOL/L (ref 5–15)
AST SERPL-CCNC: 18 U/L (ref 1–40)
BARBITURATES UR QL SCN: NEGATIVE
BENZODIAZ UR QL SCN: NEGATIVE
BILIRUB SERPL-MCNC: 0.5 MG/DL (ref 0–1.2)
BUN SERPL-MCNC: 20 MG/DL (ref 8–23)
BUN/CREAT SERPL: 19.6 (ref 7–25)
CALCIUM SPEC-SCNC: 9.3 MG/DL (ref 8.6–10.5)
CANNABINOIDS SERPL QL: POSITIVE
CHLORIDE SERPL-SCNC: 101 MMOL/L (ref 98–107)
CHOLEST SERPL-MCNC: 136 MG/DL (ref 0–200)
CO2 SERPL-SCNC: 27.5 MMOL/L (ref 22–29)
COCAINE UR QL: NEGATIVE
CREAT SERPL-MCNC: 1.02 MG/DL (ref 0.76–1.27)
EGFRCR SERPLBLD CKD-EPI 2021: 78.6 ML/MIN/1.73
FENTANYL UR-MCNC: NEGATIVE NG/ML
GLOBULIN UR ELPH-MCNC: 2.4 GM/DL
GLUCOSE SERPL-MCNC: 99 MG/DL (ref 65–99)
HBA1C MFR BLD: 6.3 % (ref 4.8–5.6)
HDLC SERPL-MCNC: 33 MG/DL (ref 40–60)
LDLC SERPL CALC-MCNC: 73 MG/DL (ref 0–100)
LDLC/HDLC SERPL: 2.07 {RATIO}
METHADONE UR QL SCN: NEGATIVE
OPIATES UR QL: NEGATIVE
OXYCODONE UR QL SCN: NEGATIVE
POTASSIUM SERPL-SCNC: 4.4 MMOL/L (ref 3.5–5.2)
PROT SERPL-MCNC: 6.7 G/DL (ref 6–8.5)
SODIUM SERPL-SCNC: 138 MMOL/L (ref 136–145)
TRIGL SERPL-MCNC: 173 MG/DL (ref 0–150)
VLDLC SERPL-MCNC: 30 MG/DL (ref 5–40)

## 2023-10-04 PROCEDURE — 80307 DRUG TEST PRSMV CHEM ANLYZR: CPT

## 2023-10-04 PROCEDURE — 36415 COLL VENOUS BLD VENIPUNCTURE: CPT

## 2023-10-04 PROCEDURE — 83036 HEMOGLOBIN GLYCOSYLATED A1C: CPT

## 2023-10-04 PROCEDURE — 80061 LIPID PANEL: CPT

## 2023-10-04 PROCEDURE — 80053 COMPREHEN METABOLIC PANEL: CPT

## 2023-10-09 ENCOUNTER — OFFICE VISIT (OUTPATIENT)
Dept: INTERNAL MEDICINE | Facility: CLINIC | Age: 73
End: 2023-10-09
Payer: MEDICARE

## 2023-10-09 VITALS
WEIGHT: 200.4 LBS | DIASTOLIC BLOOD PRESSURE: 60 MMHG | HEIGHT: 66 IN | SYSTOLIC BLOOD PRESSURE: 118 MMHG | HEART RATE: 76 BPM | TEMPERATURE: 97.8 F | BODY MASS INDEX: 32.21 KG/M2 | OXYGEN SATURATION: 97 %

## 2023-10-09 DIAGNOSIS — E11.65 TYPE 2 DIABETES MELLITUS WITH HYPERGLYCEMIA, WITH LONG-TERM CURRENT USE OF INSULIN: Primary | ICD-10-CM

## 2023-10-09 DIAGNOSIS — M15.9 PRIMARY OSTEOARTHRITIS INVOLVING MULTIPLE JOINTS: ICD-10-CM

## 2023-10-09 DIAGNOSIS — G63 POLYNEUROPATHY ASSOCIATED WITH UNDERLYING DISEASE: ICD-10-CM

## 2023-10-09 DIAGNOSIS — E78.2 MIXED HYPERLIPIDEMIA: ICD-10-CM

## 2023-10-09 DIAGNOSIS — I10 PRIMARY HYPERTENSION: ICD-10-CM

## 2023-10-09 DIAGNOSIS — Z79.4 TYPE 2 DIABETES MELLITUS WITH HYPERGLYCEMIA, WITH LONG-TERM CURRENT USE OF INSULIN: Primary | ICD-10-CM

## 2023-10-09 PROCEDURE — 3074F SYST BP LT 130 MM HG: CPT | Performed by: INTERNAL MEDICINE

## 2023-10-09 PROCEDURE — 1159F MED LIST DOCD IN RCRD: CPT | Performed by: INTERNAL MEDICINE

## 2023-10-09 PROCEDURE — 3044F HG A1C LEVEL LT 7.0%: CPT | Performed by: INTERNAL MEDICINE

## 2023-10-09 PROCEDURE — 3078F DIAST BP <80 MM HG: CPT | Performed by: INTERNAL MEDICINE

## 2023-10-09 PROCEDURE — 1160F RVW MEDS BY RX/DR IN RCRD: CPT | Performed by: INTERNAL MEDICINE

## 2023-10-09 RX ORDER — PREGABALIN 200 MG/1
200 CAPSULE ORAL 2 TIMES DAILY
Qty: 180 CAPSULE | Refills: 1 | Status: SHIPPED | OUTPATIENT
Start: 2023-10-09

## 2023-10-09 NOTE — ASSESSMENT & PLAN NOTE
Patient's issues are progressing as of 10/23.  His diabetes is under excellent control, thyroid and B12 were normal earlier this year.  He was basically maxed out on gabapentin we switched over to Lyrica and we will titrate this as well.  Referral placed for neurology, additionally getting more imagings studies of his spine, and will have neurosurgery evaluate after those MRIs are completed   Hypertension

## 2023-10-09 NOTE — ASSESSMENT & PLAN NOTE
Patient with progressive back pain.  He is on Lyrica for his neuropathy, we are happy to titrate the Lyrica dose presently.  Additionally he is utilizing tramadol.  Family agrees he is probably not a good surgical candidate, but they would like other options discussed with them.  We are setting up for MRIs of his back and neck, we will get him set up with neurosurgery for further evaluation.

## 2023-10-09 NOTE — ASSESSMENT & PLAN NOTE
A1c is down from 6.7 to 6.3 as of his 10/23 office visit.  This was after we can increase his Trulicity to 3 mg.  He is stable on this as well as full dose Jardiance and full dose metformin.  We have him on 20 of Lantus as well, think he could pretty easily backed this off to just 15 units.

## 2023-10-09 NOTE — ADDENDUM NOTE
Addended by: REYMUNDO WHYTE on: 10/9/2023 12:55 PM     Modules accepted: Orders, Level of Service

## 2023-10-09 NOTE — ASSESSMENT & PLAN NOTE
Blood pressure stable and his pulse is in the mid 70s as of his 10/23 office visit.  Patient is to continue with current dosing of medications as being taken at home.  He is going to call and let us know what dose of lisinopril/HCT he is actually taking.  He will continue with the 50 of atenolol as well.

## 2023-11-08 ENCOUNTER — OFFICE VISIT (OUTPATIENT)
Dept: PODIATRY | Facility: CLINIC | Age: 73
End: 2023-11-08
Payer: MEDICARE

## 2023-11-08 VITALS
HEIGHT: 66 IN | HEART RATE: 81 BPM | OXYGEN SATURATION: 97 % | WEIGHT: 197 LBS | DIASTOLIC BLOOD PRESSURE: 57 MMHG | TEMPERATURE: 96.9 F | BODY MASS INDEX: 31.66 KG/M2 | SYSTOLIC BLOOD PRESSURE: 111 MMHG

## 2023-11-08 DIAGNOSIS — L60.0 ONYCHOCRYPTOSIS: ICD-10-CM

## 2023-11-08 DIAGNOSIS — Z79.4 TYPE 2 DIABETES MELLITUS WITH DIABETIC POLYNEUROPATHY, WITH LONG-TERM CURRENT USE OF INSULIN: ICD-10-CM

## 2023-11-08 DIAGNOSIS — M79.672 FOOT PAIN, BILATERAL: ICD-10-CM

## 2023-11-08 DIAGNOSIS — E11.42 TYPE 2 DIABETES MELLITUS WITH DIABETIC POLYNEUROPATHY, WITH LONG-TERM CURRENT USE OF INSULIN: ICD-10-CM

## 2023-11-08 DIAGNOSIS — M79.671 FOOT PAIN, BILATERAL: ICD-10-CM

## 2023-11-08 DIAGNOSIS — E11.8 DIABETIC FOOT: Primary | ICD-10-CM

## 2023-11-08 DIAGNOSIS — B35.1 ONYCHOMYCOSIS: ICD-10-CM

## 2023-11-08 DIAGNOSIS — G62.9 NEUROPATHY: ICD-10-CM

## 2023-11-08 NOTE — PROGRESS NOTES
[facility]    Today's Date: 23    Patient Name: Norberto Whiteside  MRN: [id number]  CSN: [id number]  PCP: Moreno Pereira MD, Last PCP Visit: 10/9/2023  Referring Provider: No ref. provider found    SUBJECTIVE     Chief Complaint   Patient presents with    Left Foot - Follow-up, Nail Problem    Right Foot - Follow-up, Nail Problem     HPI: Norberto Whiteside, a 73 y.o.male, comes to clinic.    New, Established, New Problem:  est    Location:  Toenails    Duration:   Greater than five years    Onset:  Gradual    Nature:  sore with palpation.    Stable, worsening, improving:   Stable    Aggravating factors:  Pain with shoe gear and ambulation.    Previous Treatment:  debridement    Patient states their most recent blood glucose reading was 111    Medical changes: none    Patient denies any fevers, chills, nausea, vomiting, shortness of breathe, nor any other constitutional signs nor symptoms.       Past Medical History:   Diagnosis Date    Allergic     Seasonal    Allergies     Callus     Cancer     skin cancer    Diabetes mellitus     Hard of hearing     Hypertension     Sciatica     Sleep apnea      Past Surgical History:   Procedure Laterality Date    1996    COLONOSCOPY N/A 2022    Procedure: COLONOSCOPY with polypectomies;  Surgeon: Kanu Meade MD;  Location: [facility] ENDOSCOPY;  Service: General;  Laterality: N/A;  colon polyps     Family History   Problem Relation Age of Onset    Stroke Mother     Cancer Mother     Cancer Father     Cancer Sister     Malig Hyperthermia Neg Hx      Social History     Socioeconomic History    Marital status:    Tobacco Use    Smoking status: Former     Packs/day: 1.50     Years: 15.00     Additional pack years: 0.00     Total pack years: 22.50     Types: Cigarettes     Start date: 3/1/1979     Quit date: 3/1/1996     Years since quittin.7    Smokeless tobacco: Never   Vaping Use    Vaping Use: Never used   Substance and  "Sexual Activity    Alcohol use: Yes     Comment: rare    Drug use: Never    Sexual activity: Defer     No Known Allergies  Current Outpatient Medications   Medication Sig Dispense Refill    aspirin 81 MG EC tablet Take 1 tablet by mouth Daily. 90 tablet 1    atenolol (TENORMIN) 50 MG tablet Take 1 tablet by mouth Daily. 90 tablet 1    atorvastatin (LIPITOR) 40 MG tablet Take 1 tablet by mouth Daily. 90 tablet 1    docusate sodium (COLACE) 100 MG capsule Take 1 capsule by mouth 3 (Three) Times a Day. 270 capsule 1    Dulaglutide (Trulicity) 3 MG/0.5ML solution pen-injector Inject 0.5 mL under the skin into the appropriate area as directed 1 (One) Time Per Week. 6 mL 1    empagliflozin (Jardiance) 25 MG tablet tablet Take 1 tablet by mouth Daily. 90 tablet 1    fluorouracil (EFUDEX) 5 % cream       glucose blood test strip 1 each by Other route 2 (Two) Times a Day. Use as instructed 200 each 3    Insulin Glargine (LANTUS SOLOSTAR) 100 UNIT/ML injection pen Inject 15 Units under the skin into the appropriate area as directed Daily for 200 days. 15 mL 1    Insulin Pen Needle (Pen Needles 3/16\") 31G X 5 MM misc 1 kit Daily. 100 each 3    ipratropium (ATROVENT) 0.06 % nasal spray 2 sprays into the nostril(s) as directed by provider 4 (Four) Times a Day. 15 mL 3    Lancets misc 1 kit 2 (Two) Times a Day. 180 each 3    lisinopril-hydrochlorothiazide (PRINZIDE,ZESTORETIC) 10-12.5 MG per tablet Take 1 tablet by mouth Daily. Take 1/2 tablet daily (Patient taking differently: Take 1 tablet by mouth Daily.) 90 tablet 1    loratadine (CLARITIN) 10 MG tablet Take 1 tablet by mouth Daily. 90 tablet 1    metFORMIN (GLUCOPHAGE) 1000 MG tablet Take 1 tablet by mouth 2 (Two) Times a Day With Meals. 180 tablet 1    multivitamin with minerals tablet tablet Take 1 tablet by mouth Daily. 90 tablet 1    pantoprazole (PROTONIX) 40 MG EC tablet Take 1 tablet by mouth Daily. 90 tablet 1    pregabalin (Lyrica) 200 MG capsule Take 1 capsule by " mouth 2 (Two) Times a Day. 180 capsule 1    traMADol (ULTRAM) 50 MG tablet 1-2 tablets bid prn 90 tablet 1    triamcinolone (KENALOG) 0.1 % cream Apply 1 application topically to the appropriate area as directed 2 (Two) Times a Day. 30 g 1     No current facility-administered medications for this visit.     Review of Systems   Constitutional: Negative.    Skin:         Painful toenails   Neurological:  Positive for numbness.   All other systems reviewed and are negative.      OBJECTIVE     Vitals:    11/08/23 1000   BP: 111/57   Pulse: 81   Temp: 96.9 °F (36.1 °C)   SpO2: 97%         Lab Results   Component Value Date    HGBA1C 6.30 (H) 10/04/2023       Lab Results   Component Value Date    GLUCOSE 99 10/04/2023    CALCIUM 9.3 10/04/2023     10/04/2023    K 4.4 10/04/2023    CO2 27.5 10/04/2023     10/04/2023    BUN 20 10/04/2023    CREATININE 1.02 10/04/2023    EGFRIFNONA 69 01/14/2022    BCR 19.6 10/04/2023    ANIONGAP 9.5 10/04/2023       Patient seen in no apparent distress.      PHYSICAL EXAM:     Foot/Ankle Exam    GENERAL  Diabetic foot exam performed    Appearance:  elderly  Orientation:  AAOx3  Affect:  appropriate  Gait:  unimpaired  Assistance:  independent  Right shoe gear: casual shoe  Left shoe gear: casual shoe    VASCULAR     Right Foot Vascularity   Dorsalis pedis:  1+  Posterior tibial:  1+  Skin temperature:  cool  Edema grading:  None  CFT:  < 3 seconds  Pedal hair growth:  Present  Varicosities:  mild varicosities     Left Foot Vascularity   Dorsalis pedis:  1+  Posterior tibial:  1+  Skin temperature:  cool  Edema grading:  None  CFT:  < 3 seconds  Pedal hair growth:  Present  Varicosities:  mild varicosities     NEUROLOGIC     Right Foot Neurologic   Light touch sensation: diminished  Vibratory sensation: diminished  Hot/Cold sensation: diminished  Protective Sensation using [place] Monofilament:   Sites intact: 3  Sites tested: 10     Left Foot Neurologic   Light touch  sensation: diminished  Vibratory sensation: diminished  Hot/Cold sensation:  diminished  Protective Sensation using [place]-Keiko Monofilament:   Sites intact: 3  Sites tested: 10    MUSCLE STRENGTH     Right Foot Muscle Strength   Foot dorsiflexion:  4  Foot plantar flexion:  4  Foot inversion:  4  Foot eversion:  4     Left Foot Muscle Strength   Foot dorsiflexion:  4  Foot plantar flexion:  4  Foot inversion:  4  Foot eversion:  4    RANGE OF MOTION     Right Foot Range of Motion   Foot and ankle ROM within normal limits       Left Foot Range of Motion   Foot and ankle ROM within normal limits      DERMATOLOGIC      Right Foot Dermatologic   Skin  Right foot skin is intact.   Nails  1.  Positive for elongated, onychomycosis, abnormal thickness, subungual debris and ingrown toenail.  2.  Positive for elongated, onychomycosis, abnormal thickness, subungual debris and ingrown toenail.  3.  Positive for elongated, onychomycosis, abnormal thickness, subungual debris and ingrown toenail.  4.  Positive for elongated, onychomycosis, abnormal thickness, subungual debris and ingrown toenail.  5.  Positive for elongated, onychomycosis, abnormal thickness, subungual debris and ingrown toenail.  Nails comment:  Toenails 1, 2, 3, 4, and 5     Left Foot Dermatologic   Skin  Left foot skin is intact.   Nails comment:  Toenails 1, 2, 3, 4, and 5  Nails  1.  Positive for elongated, onychomycosis, abnormal thickness, subungual debris and ingrown toenail.  2.  Positive for elongated, onychomycosis, abnormal thickness, subungual debris and ingrown toenail.  3.  Positive for elongated, onychomycosis, abnormal thickness, subungual debris and ingrown toenail.  4.  Positive for elongated, onychomycosis, abnormally thick, subungual debris and ingrown toenail.  5.  Positive for elongated, onychomycosis, abnormally thick, subungual debris and ingrown toenail.    Diabetic Foot Exam Performed and Monofilament Test  Performed    ASSESSMENT/PLAN     Diagnoses and all orders for this visit:    1. Diabetic foot (Primary)    2. Onychomycosis    3. Neuropathy    4. Foot pain, bilateral    5. Type 2 diabetes mellitus with diabetic polyneuropathy, with long-term current use of insulin    6. Onychocryptosis        Comprehensive lower extremity examination and evaluation was performed.    Discussed findings and treatment plan including risks, benefits, and treatment options with patient in detail. Patient agreed with treatment plan.    Toenails 1 through 5 bilaterally were debrided in thickness and length and then smoothed with a Dremel Tool.  Tolerated the procedure well without complications.    Diabetic foot exam performed and documented this date, compliant with CQM required standards. Detail of findings as noted in physical exam.  Lower extremity Neurologic exam for diabetic patient performed and documented this date, compliant with PQRS required standards. Detail of findings as noted in physical exam.  Advised patient importance of good routine lower extremity hygiene. Advised patient importance of evaluating for intact skin and pain free nail borders.  Advised patient to use mirror to evaluate plantar/ soles of feet for better visualization. Advised patient monitor and phone office to be seen if any cracking to skin, open lesions, painful nail borders or if nails become elongated prior to next visit. Advised patient importance of daily cleansing of lower extremities, followed by good skin cream to maintain normal hydration of skin. Also advised patient importance of close daily monitoring of blood sugar. Advised to regulate diet and medications to maintain control of blood sugar in optimal range. Contact primary care provider if difficulties maintaining blood sugar levels.  Advised Patient of presence of Diabetes Mellitus condition.  Advised Patient risk of progression and worsening or improvement, then return of condition.  Will  monitor condition for any change in future. Treat with most appropriate treatment pending status of condition.  Counseled and advised patient extensively on nature and ramifications of diabetes. Standard instructions given to patient for good diabetic foot care and maintenance. Advised importance of careful monitoring to avoid break down and complications secondary to diabetes. Advised patient importance of strict maintenance of blood sugar control. Advised patient of possible ominous results from neglect of condition, i.e.: amputation/ loss of digits, feet and legs, or even death.  Patient states understands counseling, will monitor closely, continue good hygiene and routine diabetic foot care. Patient will contact office is questions or problems.      An After Visit Summary was printed and given to the patient at discharge, including (if requested) any available informative/educational handouts regarding diagnosis, treatment, or medications. All questions were answered to patient/family satisfaction. Should symptoms fail to improve or worsen they agree to call or return to clinic or to go to the Emergency Department. Discussed the importance of following up with any needed screening tests/labs/specialist appointments and any requested follow-up recommended by me today. Importance of maintaining follow-up discussed and patient accepts that missed appointments can delay diagnosis and potentially lead to worsening of conditions.    Return in about 9 weeks (around 1/10/2024) for Toenail Care., or sooner if acute issues arise.    This document has been electronically signed by Wayne Foster DPM on November 8, 2023 10:19 EST

## 2023-11-14 ENCOUNTER — HOSPITAL ENCOUNTER (OUTPATIENT)
Dept: MRI IMAGING | Facility: HOSPITAL | Age: 73
Discharge: HOME | End: 2023-11-14
Admitting: INTERNAL MEDICINE
Payer: MEDICARE

## 2023-11-14 ENCOUNTER — HOSPITAL ENCOUNTER (OUTPATIENT)
Dept: MRI IMAGING | Facility: HOSPITAL | Age: 73
Discharge: HOME | End: 2023-11-14
Payer: MEDICARE

## 2023-11-14 DIAGNOSIS — G63 POLYNEUROPATHY ASSOCIATED WITH UNDERLYING DISEASE: ICD-10-CM

## 2023-11-14 PROCEDURE — 72141 MRI NECK SPINE W/O DYE: CPT

## 2023-11-14 PROCEDURE — 72148 MRI LUMBAR SPINE W/O DYE: CPT

## 2023-11-30 ENCOUNTER — PATIENT ROUNDING (BHMG ONLY) (OUTPATIENT)
Dept: NEUROSURGERY | Facility: CLINIC | Age: 73
End: 2023-11-30
Payer: MEDICARE

## 2023-11-30 ENCOUNTER — OFFICE VISIT (OUTPATIENT)
Dept: NEUROSURGERY | Facility: CLINIC | Age: 73
End: 2023-11-30
Payer: MEDICARE

## 2023-11-30 VITALS
HEIGHT: 66 IN | SYSTOLIC BLOOD PRESSURE: 99 MMHG | HEART RATE: 100 BPM | DIASTOLIC BLOOD PRESSURE: 65 MMHG | WEIGHT: 197 LBS | BODY MASS INDEX: 31.66 KG/M2

## 2023-11-30 DIAGNOSIS — M54.2 CERVICALGIA: ICD-10-CM

## 2023-11-30 DIAGNOSIS — M50.221 HERNIATED NUCLEUS PULPOSUS, C4-5: Primary | ICD-10-CM

## 2023-11-30 NOTE — PROGRESS NOTES
"Chief Complaint  Neck Pain and Shoulder Pain (bilateral)    Subjective          Norberto Whiteside who is a 73 y.o. year old male who presents to [facility] for Evaluation of the Spine.     The patient complains of pain located in the Cervical Spine.  Patients states the pain has been present for 5 years.  The pain came on gradually.  The pain scaled level is 6.  The pain  extends into the shoulder blades bilaterally .  The pain is constant and waxing/waning and described as burning.  The pain is worse in the afternoon. Patient states nothing improves the pain.  Patient states Changes in Weather makes the pain worse.    Associated Symptoms Include: He feels like he stumbles when he walks. Numbness in both hands. Numbness intermittent in both feet. Weakness with dropping things. Looking up for too long will cause him to \"pass out\".  Conservative Interventions Include: Pain Medications that were not very effective. and Lyrica which is somewhat effective.    Was this the result of an injury or accident? : No    History of Previous Spinal Surgery?: No     reports that he quit smoking about 27 years ago. His smoking use included cigarettes. He started smoking about 44 years ago. He has a 22.50 pack-year smoking history. He has never used smokeless tobacco.    Review of Systems   Musculoskeletal:  Positive for neck pain.        Objective   Vital Signs:   BP 99/65 (BP Location: Left arm, Patient Position: Sitting, Cuff Size: Large Adult)   Pulse 100   Ht 167.6 cm (66\")   Wt 89.4 kg (197 lb)   BMI 31.80 kg/m²       Physical Exam  Constitutional:       Appearance: Normal appearance. He is obese.   Neck:      Comments: Pain with ROM  Pulmonary:      Effort: Pulmonary effort is normal.   Musculoskeletal:         General: Tenderness (midline cervical and right cervical area) present.      Comments: SLR negative bilaterally,  Clonus negative bilaterally,  Petty's negative bilaterally "   Neurological:      General: No focal deficit present.      Mental Status: He is alert and oriented to person, place, and time.      Sensory: No sensory deficit.      Motor: No weakness.      Deep Tendon Reflexes: Reflexes normal.   Psychiatric:         Mood and Affect: Mood normal.         Behavior: Behavior normal.        Neurologic Exam     Mental Status   Oriented to person, place, and time.        Result Review      I have personally reviewed the MRI of cervical spine without contrast from 11/14/2023 which shows multilevel degenerative disc disease and facet arthritis worse at C4-C5 where there is moderate to severe central canal stenosis with severe left and moderate to severe right foraminal stenosis.  There is severe right foraminal narrowing at C3-C4 with moderate severe left foraminal narrowing.  There is at least moderate bilateral foraminal narrowing at C5-C6.  There is T2 spinal cord signal change at the C4-C5 level.     Assessment and Plan    Diagnoses and all orders for this visit:    1. Herniated nucleus pulposus, C4-5 (Primary)  -     Case Request; Standing  -     Follow Anesthesia Guidelines / Protocol; Standing  -     Obtain informed consent; Standing  -     SCD (sequential compression device)- to be placed on patient in Pre-op; Standing  -     Verify / Perform Chlorhexidine Skin Prep; Standing  -     ceFAZolin (ANCEF) 2 g in sodium chloride 0.9 % 100 mL IVPB  -     Case Request    2. Cervicalgia    He has no signs of myelopathy on examination.    He does have spinal cord signal change at C4-C5.    His main pain is in the neck and across the bilateral shoulder blades.    He has numbness in the hands bilaterally constantly.    He is did discuss this change with Dr. Michael to consider possible ACDF at C4-C5, including the risks, benefits and alternatives of surgery. He would like to pursue a surgical approach. He will be scheduled for ACDF at C4-C5 using a right approach.    He will follow-up for  surgery and post-op.    Follow Up   Return for Surgery and post-op.  Patient was given instructions and counseling regarding his condition or for health maintenance advice. Please see specific information pulled into the AVS if appropriate.

## 2023-12-13 PROBLEM — M50.221 HERNIATED NUCLEUS PULPOSUS, C4-5: Status: ACTIVE | Noted: 2023-11-30

## 2023-12-20 ENCOUNTER — OFFICE VISIT (OUTPATIENT)
Dept: NEUROLOGY | Facility: CLINIC | Age: 73
End: 2023-12-20
Payer: MEDICARE

## 2023-12-20 VITALS
DIASTOLIC BLOOD PRESSURE: 76 MMHG | WEIGHT: 198 LBS | SYSTOLIC BLOOD PRESSURE: 155 MMHG | BODY MASS INDEX: 31.82 KG/M2 | HEIGHT: 66 IN | HEART RATE: 82 BPM

## 2023-12-20 DIAGNOSIS — G63 POLYNEUROPATHY ASSOCIATED WITH UNDERLYING DISEASE: Primary | ICD-10-CM

## 2023-12-20 NOTE — ASSESSMENT & PLAN NOTE
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  Clinically he has severe diabetic neuropathy involving the upper and lower extremities.  I discussed with him and his daughter that he has a separate problem with cervical spinal stenosis and lumbar spine spinal stenosis which are separate from his diagnosis of diabetic polyneuropathy.  I discussed with him that Dr. Pereira may try him on Cymbalta on top of his Lyrica.  Thank you for letting me participate in his care.

## 2023-12-20 NOTE — PROGRESS NOTES
"Chief Complaint  Neurologic Problem (Neuropathy in BUE/BLE. )    Subjective          Norberto Whiteside is a 73 y.o. male who presents to [facility]  History of Present Illness  73-year-old man evaluated for EMG nerve conduction study.  He has had neuropathy in his hands and feet for the past 5 years.  He has diabetes for 20 years.  He states that there is burning sensations in his hands and feet most of the time.  He is taking pregabalin 200 mg twice a day which is not helpful to him.  He also had a cervical spine MRI showing spinal cord myelomalacia from cervical disc disease.  Is supposed to get decompression surgery.  Objective   Vital Signs:   /76 (BP Location: Right arm, Patient Position: Sitting, Cuff Size: Adult)   Pulse 82   Ht 167.6 cm (65.98\")   Wt 89.8 kg (198 lb)   BMI 31.97 kg/m²     Physical Exam   There is no weakness of the deltoids, biceps, triceps, pronators, supinators, extensors of the wrist and fingers.  There is 4/5 strength on intrinsic hand muscles.  Questionable sensory level in his high neck.  There is sensory loss in a stocking distribution in his legs to the knee.  Sensory is decreased to pinprick to the mid forearm.        Assessment and Plan  Diagnoses and all orders for this visit:    1. 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  Clinically he has severe diabetic neuropathy involving the upper and lower extremities.  I discussed with him and his daughter that he has a separate problem with cervical spinal stenosis and lumbar spine spinal stenosis which are separate from his diagnosis of diabetic polyneuropathy.  I discussed with him that Dr. Pereira may try him on Cymbalta on top of his Lyrica.  Thank you for letting me participate in " his care.           Nerve Conduction Study:  8 nerves     EMG:  Limited    Total time spent with the patient and coordinating patient care was 15 minutes.    Follow Up  No follow-ups on file.  Patient was given instructions and counseling regarding his condition or for health maintenance advice. Please see specific information pulled into the AVS if appropriate.

## 2024-01-03 ENCOUNTER — LAB (OUTPATIENT)
Dept: LAB | Facility: HOSPITAL | Age: 74
End: 2024-01-03
Payer: MEDICARE

## 2024-01-03 DIAGNOSIS — I10 PRIMARY HYPERTENSION: ICD-10-CM

## 2024-01-03 DIAGNOSIS — Z79.4 TYPE 2 DIABETES MELLITUS WITH HYPERGLYCEMIA, WITH LONG-TERM CURRENT USE OF INSULIN: ICD-10-CM

## 2024-01-03 DIAGNOSIS — E11.65 TYPE 2 DIABETES MELLITUS WITH HYPERGLYCEMIA, WITH LONG-TERM CURRENT USE OF INSULIN: ICD-10-CM

## 2024-01-03 LAB
ANION GAP SERPL CALCULATED.3IONS-SCNC: 11 MMOL/L (ref 5–15)
BASOPHILS # BLD AUTO: 0.04 10*3/MM3 (ref 0–0.2)
BASOPHILS NFR BLD AUTO: 0.5 % (ref 0–1.5)
BUN SERPL-MCNC: 19 MG/DL (ref 8–23)
BUN/CREAT SERPL: 17.8 (ref 7–25)
CALCIUM SPEC-SCNC: 10 MG/DL (ref 8.6–10.5)
CHLORIDE SERPL-SCNC: 102 MMOL/L (ref 98–107)
CO2 SERPL-SCNC: 28 MMOL/L (ref 22–29)
CREAT SERPL-MCNC: 1.07 MG/DL (ref 0.76–1.27)
DEPRECATED RDW RBC AUTO: 42 FL (ref 37–54)
EGFRCR SERPLBLD CKD-EPI 2021: 74.2 ML/MIN/1.73
EOSINOPHIL # BLD AUTO: 0.3 10*3/MM3 (ref 0–0.4)
EOSINOPHIL NFR BLD AUTO: 3.4 % (ref 0.3–6.2)
ERYTHROCYTE [DISTWIDTH] IN BLOOD BY AUTOMATED COUNT: 12.9 % (ref 12.3–15.4)
GLUCOSE SERPL-MCNC: 109 MG/DL (ref 65–99)
HBA1C MFR BLD: 6.3 % (ref 4.8–5.6)
HCT VFR BLD AUTO: 46.7 % (ref 37.5–51)
HGB BLD-MCNC: 16.4 G/DL (ref 13–17.7)
IMM GRANULOCYTES # BLD AUTO: 0.03 10*3/MM3 (ref 0–0.05)
IMM GRANULOCYTES NFR BLD AUTO: 0.3 % (ref 0–0.5)
LYMPHOCYTES # BLD AUTO: 3.04 10*3/MM3 (ref 0.7–3.1)
LYMPHOCYTES NFR BLD AUTO: 34.9 % (ref 19.6–45.3)
MCH RBC QN AUTO: 31.2 PG (ref 26.6–33)
MCHC RBC AUTO-ENTMCNC: 35.1 G/DL (ref 31.5–35.7)
MCV RBC AUTO: 89 FL (ref 79–97)
MONOCYTES # BLD AUTO: 0.92 10*3/MM3 (ref 0.1–0.9)
MONOCYTES NFR BLD AUTO: 10.6 % (ref 5–12)
NEUTROPHILS NFR BLD AUTO: 4.38 10*3/MM3 (ref 1.7–7)
NEUTROPHILS NFR BLD AUTO: 50.3 % (ref 42.7–76)
NRBC BLD AUTO-RTO: 0 /100 WBC (ref 0–0.2)
PLATELET # BLD AUTO: 225 10*3/MM3 (ref 140–450)
PMV BLD AUTO: 12.2 FL (ref 6–12)
POTASSIUM SERPL-SCNC: 4.8 MMOL/L (ref 3.5–5.2)
RBC # BLD AUTO: 5.25 10*6/MM3 (ref 4.14–5.8)
SODIUM SERPL-SCNC: 141 MMOL/L (ref 136–145)
T4 FREE SERPL-MCNC: 1.42 NG/DL (ref 0.93–1.7)
TSH SERPL DL<=0.05 MIU/L-ACNC: 2.49 UIU/ML (ref 0.27–4.2)
WBC NRBC COR # BLD AUTO: 8.71 10*3/MM3 (ref 3.4–10.8)

## 2024-01-03 PROCEDURE — 85025 COMPLETE CBC W/AUTO DIFF WBC: CPT

## 2024-01-03 PROCEDURE — 80048 BASIC METABOLIC PNL TOTAL CA: CPT

## 2024-01-03 PROCEDURE — 83036 HEMOGLOBIN GLYCOSYLATED A1C: CPT

## 2024-01-03 PROCEDURE — 84443 ASSAY THYROID STIM HORMONE: CPT

## 2024-01-03 PROCEDURE — 36415 COLL VENOUS BLD VENIPUNCTURE: CPT

## 2024-01-03 PROCEDURE — 84439 ASSAY OF FREE THYROXINE: CPT

## 2024-01-05 NOTE — PRE-PROCEDURE INSTRUCTIONS
PATIENT INSTRUCTED TO BE:    - NPO AFTER MIDNIGHT EXCEPT CAN HAVE CLEAR LIQUIDS 2 HOURS PRIOR TO SURGERY ARRIVAL TIME     - TO HOLD ALL VITAMINS, SUPPLEMENTS, NSAIDS FOR ONE WEEK PRIOR TO THEIR SURGICAL PROCEDURE    - INSTRUCTED PT TO USE SURGICAL SOAP 1 TIME THE NIGHT PRIOR TO SURGERY OR THE AM OF SURGERY.   USE SOAP FROM NECK TO TOES AVOID THEIR FACE, HAIR, AND PRIVATE PARTS. INSTRUCTED NO LOTIONS, JEWELRY, PIERCINGS, OR DEODORANT DAY OF SURGERY        - INSTRUCTED TO TAKE THE FOLLOWING MEDICATIONS THE DAY OF SURGERY:   Lyrica, Atrovent, Claritin, Lipitor  Lantus 7.5 Units    PATIENT VERBALIZED UNDERSTANDING

## 2024-01-09 ENCOUNTER — OFFICE VISIT (OUTPATIENT)
Dept: INTERNAL MEDICINE | Facility: CLINIC | Age: 74
End: 2024-01-09
Payer: MEDICARE

## 2024-01-09 ENCOUNTER — ANESTHESIA EVENT (OUTPATIENT)
Dept: PERIOP | Facility: HOSPITAL | Age: 74
End: 2024-01-09
Payer: MEDICARE

## 2024-01-09 VITALS
DIASTOLIC BLOOD PRESSURE: 60 MMHG | BODY MASS INDEX: 31.88 KG/M2 | OXYGEN SATURATION: 95 % | WEIGHT: 198.4 LBS | TEMPERATURE: 98.2 F | HEIGHT: 66 IN | HEART RATE: 80 BPM | SYSTOLIC BLOOD PRESSURE: 110 MMHG

## 2024-01-09 DIAGNOSIS — M50.221 HERNIATED NUCLEUS PULPOSUS, C4-5: ICD-10-CM

## 2024-01-09 DIAGNOSIS — G89.29 CHRONIC PAIN WITH DRUG DEPENDENCE: ICD-10-CM

## 2024-01-09 DIAGNOSIS — Z79.4 TYPE 2 DIABETES MELLITUS WITH HYPERGLYCEMIA, WITH LONG-TERM CURRENT USE OF INSULIN: Primary | ICD-10-CM

## 2024-01-09 DIAGNOSIS — I10 PRIMARY HYPERTENSION: ICD-10-CM

## 2024-01-09 DIAGNOSIS — G63 POLYNEUROPATHY ASSOCIATED WITH UNDERLYING DISEASE: ICD-10-CM

## 2024-01-09 DIAGNOSIS — Z12.5 PROSTATE CANCER SCREENING: ICD-10-CM

## 2024-01-09 DIAGNOSIS — F19.20 CHRONIC PAIN WITH DRUG DEPENDENCE: ICD-10-CM

## 2024-01-09 DIAGNOSIS — E78.2 MIXED HYPERLIPIDEMIA: ICD-10-CM

## 2024-01-09 DIAGNOSIS — E11.65 TYPE 2 DIABETES MELLITUS WITH HYPERGLYCEMIA, WITH LONG-TERM CURRENT USE OF INSULIN: Primary | ICD-10-CM

## 2024-01-09 LAB
ALBUMIN UR-MCNC: 1.5 MG/DL
CREAT UR-MCNC: 83.5 MG/DL
MICROALBUMIN/CREAT UR: 18 MG/G (ref 0–29)

## 2024-01-09 PROCEDURE — 1159F MED LIST DOCD IN RCRD: CPT | Performed by: INTERNAL MEDICINE

## 2024-01-09 PROCEDURE — 82043 UR ALBUMIN QUANTITATIVE: CPT | Performed by: INTERNAL MEDICINE

## 2024-01-09 PROCEDURE — 3078F DIAST BP <80 MM HG: CPT | Performed by: INTERNAL MEDICINE

## 2024-01-09 PROCEDURE — 1160F RVW MEDS BY RX/DR IN RCRD: CPT | Performed by: INTERNAL MEDICINE

## 2024-01-09 PROCEDURE — 3074F SYST BP LT 130 MM HG: CPT | Performed by: INTERNAL MEDICINE

## 2024-01-09 PROCEDURE — 82570 ASSAY OF URINE CREATININE: CPT | Performed by: INTERNAL MEDICINE

## 2024-01-09 PROCEDURE — 99214 OFFICE O/P EST MOD 30 MIN: CPT | Performed by: INTERNAL MEDICINE

## 2024-01-09 PROCEDURE — 3044F HG A1C LEVEL LT 7.0%: CPT | Performed by: INTERNAL MEDICINE

## 2024-01-09 RX ORDER — ATENOLOL 50 MG/1
50 TABLET ORAL NIGHTLY
Qty: 90 TABLET | Refills: 3 | Status: SHIPPED | OUTPATIENT
Start: 2024-01-09

## 2024-01-09 RX ORDER — TRAMADOL HYDROCHLORIDE 50 MG/1
TABLET ORAL
Qty: 90 TABLET | Refills: 1 | Status: SHIPPED | OUTPATIENT
Start: 2024-01-09 | End: 2024-01-11 | Stop reason: HOSPADM

## 2024-01-09 RX ORDER — LISINOPRIL AND HYDROCHLOROTHIAZIDE 12.5; 1 MG/1; MG/1
1 TABLET ORAL NIGHTLY
Qty: 90 TABLET | Refills: 3 | Status: SHIPPED | OUTPATIENT
Start: 2024-01-09

## 2024-01-09 RX ORDER — PANTOPRAZOLE SODIUM 40 MG/1
40 TABLET, DELAYED RELEASE ORAL DAILY
Qty: 90 TABLET | Refills: 3 | Status: SHIPPED | OUTPATIENT
Start: 2024-01-09

## 2024-01-09 RX ORDER — LORATADINE 10 MG/1
10 TABLET ORAL DAILY
Qty: 90 TABLET | Refills: 3 | Status: SHIPPED | OUTPATIENT
Start: 2024-01-09

## 2024-01-09 RX ORDER — ATORVASTATIN CALCIUM 40 MG/1
40 TABLET, FILM COATED ORAL NIGHTLY
Qty: 90 TABLET | Refills: 3 | Status: SHIPPED | OUTPATIENT
Start: 2024-01-09

## 2024-01-09 RX ORDER — DOCUSATE SODIUM 100 MG/1
100 CAPSULE, LIQUID FILLED ORAL 3 TIMES DAILY
Qty: 270 CAPSULE | Refills: 3 | Status: SHIPPED | OUTPATIENT
Start: 2024-01-09

## 2024-01-09 RX ORDER — DULOXETIN HYDROCHLORIDE 30 MG/1
30 CAPSULE, DELAYED RELEASE ORAL DAILY
Qty: 30 CAPSULE | Refills: 1 | Status: SHIPPED | OUTPATIENT
Start: 2024-01-09 | End: 2024-02-08

## 2024-01-09 RX ORDER — ASPIRIN 81 MG/1
81 TABLET ORAL DAILY
Qty: 90 TABLET | Refills: 3 | Status: SHIPPED | OUTPATIENT
Start: 2024-01-09

## 2024-01-09 NOTE — ASSESSMENT & PLAN NOTE
Patient has been eval by Dr. Michael and is actually scheduled for cervical surgery tomorrow as of his 1/24 office visit.  He tells me that after he recovers from surgery, Dr. Michael will address his lumbar issues.

## 2024-01-09 NOTE — ASSESSMENT & PLAN NOTE
I reviewed Dr. Salter's note at the patient's 1/24 office visit.  He is already on generous dose of Lyrica, he recommends we add Cymbalta to this.  Will start low-dose, patient to call in about a month or so in regards to response or lack thereof, will titrate at that time if so.

## 2024-01-09 NOTE — PROGRESS NOTES
"Chief Complaint  Diabetes and Follow-up (Pt states that this is routine, he had labs. /He is scheduled for back surgery with Dr. Michael./Dr. Salter suggest that you prescribe Cymbalta along with lyrica to help the neuropathy. )    Subjective          Norberto Whiteside presents to [facility]     History of present illness:  Patient 73-year-old male with underlying diabetes mellitus, hypertension, hyperlipidemia, resultant coronary artery disease with prior stent, who is coming in 10/23 for routine 3-4-month follow-up.  We will review his labs and make further recommendations at that time.    Review of Systems   Constitutional:  Negative for appetite change, fatigue and fever.   HENT:  Negative for congestion and ear pain.    Eyes:  Negative for blurred vision.   Respiratory:  Negative for cough, chest tightness, shortness of breath and wheezing.    Cardiovascular:  Negative for chest pain, palpitations and leg swelling.   Gastrointestinal:  Negative for abdominal pain.   Genitourinary:  Negative for difficulty urinating, dysuria and hematuria.   Musculoskeletal:  Negative for arthralgias and gait problem.   Skin:  Negative for skin lesions.   Neurological:  Negative for syncope, memory problem and confusion.   Psychiatric/Behavioral:  Negative for self-injury and depressed mood.        Objective   Vital Signs:   /60   Pulse 80   Temp 98.2 °F (36.8 °C) (Skin)   Ht 167.6 cm (65.98\")   Wt 90 kg (198 lb 6.4 oz)   SpO2 95%   BMI 32.04 kg/m²           Physical Exam  Vitals and nursing note reviewed.   Constitutional:       General: He is not in acute distress.     Appearance: Normal appearance. He is not toxic-appearing.   HENT:      Head: Atraumatic.      Right Ear: External ear normal.      Left Ear: External ear normal.      Nose: Nose normal.      Mouth/Throat:      Mouth: Mucous membranes are moist.   Eyes:      General:         Right eye: No discharge.         Left eye: " No discharge.      Extraocular Movements: Extraocular movements intact.      Pupils: Pupils are equal, round, and reactive to light.   Cardiovascular:      Rate and Rhythm: Normal rate and regular rhythm.      Pulses: Normal pulses.      Heart sounds: Normal heart sounds. No murmur heard.     No gallop.   Pulmonary:      Effort: Pulmonary effort is normal. No respiratory distress.      Breath sounds: No wheezing, rhonchi or rales.   Abdominal:      General: There is no distension.      Palpations: Abdomen is soft. There is no mass.      Tenderness: There is no abdominal tenderness. There is no guarding.   Musculoskeletal:         General: No swelling or tenderness.      Cervical back: No tenderness.      Right lower leg: No edema.      Left lower leg: No edema.   Skin:     General: Skin is warm and dry.      Findings: No rash.   Neurological:      General: No focal deficit present.      Mental Status: He is alert and oriented to person, place, and time. Mental status is at baseline.      Motor: No weakness.      Gait: Gait normal.   Psychiatric:         Mood and Affect: Mood normal.         Thought Content: Thought content normal.          Result Review :   The following data was reviewed by: Moreno Pereira MD on 10/05/2021:                  Assessment and Plan    Diagnoses and all orders for this visit:    1. Type 2 diabetes mellitus with hyperglycemia, with long-term current use of insulin (Primary)  Assessment & Plan:  A1c is very stable at 6.3 as of his 1/24 office visit.  He can continue with moderate dose Trulicity along with full doses of metformin and Jardiance.  Additionally we backed him off from 20 to 15 units of Lantus, he certainly stable with this change, no hypoglycemic episodes, fasting sugars 109 presently, so we will continue same going forward.    Orders:  -     Microalbumin / Creatinine Urine Ratio - Urine, Clean Catch; Future  -     Hemoglobin A1c; Future    2. Primary hypertension  Assessment &  Plan:  Blood pressure well-controlled and his pulse is around 80 as of his 1/24 office visit.  He is stable to continue with moderate dose atenolol and low-dose lisinopril/HCT    Orders:  -     Comprehensive Metabolic Panel; Future    3. Mixed hyperlipidemia  -     Lipid Panel; Future    4. Polyneuropathy associated with underlying disease  Assessment & Plan:  I reviewed Dr. Salter's note at the patient's 1/24 office visit.  He is already on generous dose of Lyrica, he recommends we add Cymbalta to this.  Will start low-dose, patient to call in about a month or so in regards to response or lack thereof, will titrate at that time if so.      5. Herniated nucleus pulposus, C4-5  Assessment & Plan:  Patient has been eval by Dr. Michael and is actually scheduled for cervical surgery tomorrow as of his 1/24 office visit.  He tells me that after he recovers from surgery, Dr. Michael will address his lumbar issues.      6. Prostate cancer screening  -     PSA Screen; Future    7. Chronic pain with drug dependence  -     traMADol (ULTRAM) 50 MG tablet; 1-2 tablets bid prn  Dispense: 90 tablet; Refill: 1    Other orders  -     DULoxetine (CYMBALTA) 30 MG capsule; Take 1 capsule by mouth Daily for 30 days.  Dispense: 30 capsule; Refill: 1  -     aspirin 81 MG EC tablet; Take 1 tablet by mouth Daily.  Dispense: 90 tablet; Refill: 3  -     atenolol (TENORMIN) 50 MG tablet; Take 1 tablet by mouth Every Night.  Dispense: 90 tablet; Refill: 3  -     atorvastatin (LIPITOR) 40 MG tablet; Take 1 tablet by mouth Every Night.  Dispense: 90 tablet; Refill: 3  -     docusate sodium (COLACE) 100 MG capsule; Take 1 capsule by mouth 3 (Three) Times a Day.  Dispense: 270 capsule; Refill: 3  -     empagliflozin (Jardiance) 25 MG tablet tablet; Take 1 tablet by mouth Daily.  Dispense: 90 tablet; Refill: 3  -     Insulin Glargine (LANTUS SOLOSTAR) 100 UNIT/ML injection pen; Inject 15 Units under the skin into the appropriate area as directed  Daily for 200 days.  Dispense: 15 mL; Refill: 3  -     lisinopril-hydrochlorothiazide (PRINZIDE,ZESTORETIC) 10-12.5 MG per tablet; Take 1 tablet by mouth Every Night.  Dispense: 90 tablet; Refill: 3  -     loratadine (CLARITIN) 10 MG tablet; Take 1 tablet by mouth Daily.  Dispense: 90 tablet; Refill: 3  -     metFORMIN (GLUCOPHAGE) 1000 MG tablet; Take 1 tablet by mouth 2 (Two) Times a Day With Meals.  Dispense: 180 tablet; Refill: 3  -     pantoprazole (PROTONIX) 40 MG EC tablet; Take 1 tablet by mouth Daily.  Dispense: 90 tablet; Refill: 3      Total Time Spent:42 minutes     This time includes time spent by me in the following activities: preparing for the visit, reviewing extensive past medical history and tests, performing a medically appropriate examination and/or evaluation, counseling and educating the patient and/or caregivers, ordering medications, tests, or procedures, referring and/or communicating with other health care professionals and documenting information in the medical record all on this date of service.         --  --  OLDER NOTES:  VISIT 6/21:  ANNUAL MEDICARE WELLNESS PHYSICAL 9/17 = reviewed all forms with pt in office; no new concerns raised.  DM = A1C as below and OPTHO=20/20 and saw them in spring '18.  --  DM 2 ('15) and was started on Tanzem 3 mo ago and low dose amaryl was stopped; needs repeat labs, but FBS still in 280 ballpark; last A1C=9.9; will increase Tanzem before add another agent...down 10.8 to 8.5 past 4 months, so no invokana yet...7.6 is great trend, so no changes 1/18...8.5 again so needs jardiance/etc now since this is despite wt down some at 5/18 OV; also, may lose tanzem he tells me...8.2 is w/o any new agents, wt is still trending down, so will wait until after new year to add another if still in the 8's...8.4 and he will find out which one is covered...8.3 and got on Jardiance, so need to max it out as of 5/19 OV...7.9 is ok for now at least...8.1 and he blames it on  his diet around holiday time; maxed out on 3 meds; will use lantus if same on RTO...8.3 and I d/w he needs Lantus now=10U qd and titrate...7.4 is nice drop already 9/20---> 7.1 is better 6/21.  (Micro-alb neg 5/20)  --  CAD s/p PTCA '96 with need for SPECT soonish=been too long it sound like; no sxs at least---> needs eval as of 6/21 for dizzy/weak/feels off;   LIPIDS with LDL 14 and TG's 400, ? lab error; will repeat prior to changes...LDL 43 with TG's < 200 is fine...LDL 37/TG's 300 baseline 12/19...LDL 27, so will lower dose now to 40 mg---> 60 is better 1/21  --  HTN remains well controlled and ok to lower to 1/2 tab ACEI/HCT if stays low at home.  ?CKD3 = 52% and will get on RTO in '19... >60%... 40% out of the blue 9/19---> 55% holding 6/21.  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  NEUROPATHY is worse 5/20 and I d/w anodyne is an option; worse when active; will use PRN ultram.  OBESITY with BMI 37 ballpark (TSH neg 5/18).  --  --  PSA 0.5 (10/19/2022)   COLON 1/22 = 2 polyps = 5 yrs per Dr Meade.  Pneumovax x1 ; Prevnar 9/17.  ( 10/21 after 32 years of marriage and she was 10 yrs older = 81 when she passMisty segundo had severe dementia at the end; retired  and then  and retired '14, 1 girl in town)    Follow Up   Return in about 4 months (around 5/9/2024).  Patient was given instructions and counseling regarding his condition or for health maintenance advice. Please see specific information pulled into the AVS if appropriate.

## 2024-01-09 NOTE — ASSESSMENT & PLAN NOTE
Blood pressure well-controlled and his pulse is around 80 as of his 1/24 office visit.  He is stable to continue with moderate dose atenolol and low-dose lisinopril/HCT

## 2024-01-09 NOTE — ASSESSMENT & PLAN NOTE
A1c is very stable at 6.3 as of his 1/24 office visit.  He can continue with moderate dose Trulicity along with full doses of metformin and Jardiance.  Additionally we backed him off from 20 to 15 units of Lantus, he certainly stable with this change, no hypoglycemic episodes, fasting sugars 109 presently, so we will continue same going forward.

## 2024-01-10 ENCOUNTER — HOSPITAL ENCOUNTER (OUTPATIENT)
Facility: HOSPITAL | Age: 74
Discharge: HOME | End: 2024-01-11
Attending: NEUROLOGICAL SURGERY | Admitting: NEUROLOGICAL SURGERY
Payer: MEDICARE

## 2024-01-10 ENCOUNTER — APPOINTMENT (OUTPATIENT)
Dept: GENERAL RADIOLOGY | Facility: HOSPITAL | Age: 74
End: 2024-01-10
Payer: MEDICARE

## 2024-01-10 ENCOUNTER — ANESTHESIA (OUTPATIENT)
Dept: PERIOP | Facility: HOSPITAL | Age: 74
End: 2024-01-10
Payer: MEDICARE

## 2024-01-10 DIAGNOSIS — M50.221 HERNIATED NUCLEUS PULPOSUS, C4-5: ICD-10-CM

## 2024-01-10 PROBLEM — M48.02 CERVICAL SPINAL STENOSIS: Status: ACTIVE | Noted: 2024-01-10

## 2024-01-10 LAB
GLUCOSE BLDC GLUCOMTR-MCNC: 105 MG/DL (ref 70–99)
GLUCOSE BLDC GLUCOMTR-MCNC: 108 MG/DL (ref 70–99)
GLUCOSE BLDC GLUCOMTR-MCNC: 156 MG/DL (ref 70–99)
GLUCOSE BLDC GLUCOMTR-MCNC: 99 MG/DL (ref 70–99)
QT INTERVAL: 383 MS
QTC INTERVAL: 433 MS

## 2024-01-10 PROCEDURE — 76000 FLUOROSCOPY <1 HR PHYS/QHP: CPT

## 2024-01-10 PROCEDURE — A9270 NON-COVERED ITEM OR SERVICE: HCPCS | Performed by: NEUROLOGICAL SURGERY

## 2024-01-10 PROCEDURE — 94799 UNLISTED PULMONARY SVC/PX: CPT

## 2024-01-10 PROCEDURE — 63710000001 CETIRIZINE 10 MG TABLET: Performed by: NEUROLOGICAL SURGERY

## 2024-01-10 PROCEDURE — 82948 REAGENT STRIP/BLOOD GLUCOSE: CPT

## 2024-01-10 PROCEDURE — 25010000002 MIDAZOLAM PER 1MG: Performed by: ANESTHESIOLOGY

## 2024-01-10 PROCEDURE — 63710000001 DOCUSATE SODIUM 100 MG CAPSULE: Performed by: NEUROLOGICAL SURGERY

## 2024-01-10 PROCEDURE — C1713 ANCHOR/SCREW BN/BN,TIS/BN: HCPCS | Performed by: NEUROLOGICAL SURGERY

## 2024-01-10 PROCEDURE — 63710000001 HYDROCHLOROTHIAZIDE 12.5 MG TABLET: Performed by: NEUROLOGICAL SURGERY

## 2024-01-10 PROCEDURE — 25010000002 FENTANYL CITRATE (PF) 50 MCG/ML SOLUTION: Performed by: NURSE ANESTHETIST, CERTIFIED REGISTERED

## 2024-01-10 PROCEDURE — 25010000002 METOCLOPRAMIDE PER 10 MG: Performed by: ANESTHESIOLOGY

## 2024-01-10 PROCEDURE — 25010000002 DEXAMETHASONE PER 1 MG: Performed by: NURSE ANESTHETIST, CERTIFIED REGISTERED

## 2024-01-10 PROCEDURE — 63710000001 TRIAMCINOLONE 0.1 % CREAM 15 G TUBE: Performed by: NEUROLOGICAL SURGERY

## 2024-01-10 PROCEDURE — 63710000001 DULOXETINE 30 MG CAPSULE DELAYED-RELEASE PARTICLES: Performed by: NEUROLOGICAL SURGERY

## 2024-01-10 PROCEDURE — 63710000001 EMPAGLIFLOZIN 25 MG TABLET: Performed by: NEUROLOGICAL SURGERY

## 2024-01-10 PROCEDURE — 63710000001 ATENOLOL 50 MG TABLET: Performed by: NEUROLOGICAL SURGERY

## 2024-01-10 PROCEDURE — 63710000001 LISINOPRIL 10 MG TABLET: Performed by: NEUROLOGICAL SURGERY

## 2024-01-10 PROCEDURE — 25810000003 SODIUM CHLORIDE 0.9 % SOLUTION: Performed by: NEUROLOGICAL SURGERY

## 2024-01-10 PROCEDURE — 63710000001 PREGABALIN 100 MG CAPSULE: Performed by: NEUROLOGICAL SURGERY

## 2024-01-10 PROCEDURE — 25810000003 LACTATED RINGERS PER 1000 ML: Performed by: ANESTHESIOLOGY

## 2024-01-10 PROCEDURE — 25010000002 SUGAMMADEX 200 MG/2ML SOLUTION: Performed by: NURSE ANESTHETIST, CERTIFIED REGISTERED

## 2024-01-10 PROCEDURE — 25010000002 GLYCOPYRROLATE 0.2 MG/ML SOLUTION: Performed by: NURSE ANESTHETIST, CERTIFIED REGISTERED

## 2024-01-10 PROCEDURE — 25010000002 ONDANSETRON PER 1 MG: Performed by: NURSE ANESTHETIST, CERTIFIED REGISTERED

## 2024-01-10 PROCEDURE — 25010000002 CEFAZOLIN IN DEXTROSE 2-4 GM/100ML-% SOLUTION: Performed by: NEUROLOGICAL SURGERY

## 2024-01-10 PROCEDURE — 25010000002 PROPOFOL 10 MG/ML EMULSION: Performed by: NURSE ANESTHETIST, CERTIFIED REGISTERED

## 2024-01-10 PROCEDURE — 63710000001 PANTOPRAZOLE 40 MG TABLET DELAYED-RELEASE: Performed by: NEUROLOGICAL SURGERY

## 2024-01-10 PROCEDURE — C1894 INTRO/SHEATH, NON-LASER: HCPCS | Performed by: NEUROLOGICAL SURGERY

## 2024-01-10 PROCEDURE — 25010000002 CEFAZOLIN IN DEXTROSE 2000 MG/ 100 ML SOLUTION: Performed by: NEUROLOGICAL SURGERY

## 2024-01-10 PROCEDURE — 63710000001 ATORVASTATIN 40 MG TABLET: Performed by: NEUROLOGICAL SURGERY

## 2024-01-10 PROCEDURE — 25010000002 KETOROLAC TROMETHAMINE PER 15 MG: Performed by: NURSE ANESTHETIST, CERTIFIED REGISTERED

## 2024-01-10 PROCEDURE — 93005 ELECTROCARDIOGRAM TRACING: CPT | Performed by: ANESTHESIOLOGY

## 2024-01-10 DEVICE — PLT SKYLINE 1LEVEL 14MM: Type: IMPLANTABLE DEVICE | Site: SPINE CERVICAL | Status: FUNCTIONAL

## 2024-01-10 DEVICE — ALLOGRFT SPINE CERV VERTIGRAFT WEDGE FZ 7DEG PRESERV 5.75MM: Type: IMPLANTABLE DEVICE | Site: SPINE CERVICAL | Status: FUNCTIONAL

## 2024-01-10 DEVICE — SCRW SKYLINE VAR SD 14MM: Type: IMPLANTABLE DEVICE | Site: SPINE CERVICAL | Status: FUNCTIONAL

## 2024-01-10 RX ORDER — DOCUSATE SODIUM 100 MG/1
100 CAPSULE, LIQUID FILLED ORAL 3 TIMES DAILY
Status: DISCONTINUED | OUTPATIENT
Start: 2024-01-10 | End: 2024-01-11 | Stop reason: HOSPADM

## 2024-01-10 RX ORDER — OXYCODONE AND ACETAMINOPHEN 10; 325 MG/1; MG/1
1 TABLET ORAL EVERY 4 HOURS PRN
Status: DISCONTINUED | OUTPATIENT
Start: 2024-01-10 | End: 2024-01-11 | Stop reason: HOSPADM

## 2024-01-10 RX ORDER — IPRATROPIUM BROMIDE 42 UG/1
2 SPRAY, METERED NASAL 4 TIMES DAILY
Status: DISCONTINUED | OUTPATIENT
Start: 2024-01-10 | End: 2024-01-11 | Stop reason: HOSPADM

## 2024-01-10 RX ORDER — DULOXETIN HYDROCHLORIDE 30 MG/1
30 CAPSULE, DELAYED RELEASE ORAL DAILY
Status: DISCONTINUED | OUTPATIENT
Start: 2024-01-10 | End: 2024-01-11 | Stop reason: HOSPADM

## 2024-01-10 RX ORDER — PREGABALIN 100 MG/1
200 CAPSULE ORAL 2 TIMES DAILY
Status: DISCONTINUED | OUTPATIENT
Start: 2024-01-10 | End: 2024-01-11 | Stop reason: HOSPADM

## 2024-01-10 RX ORDER — ONDANSETRON 2 MG/ML
4 INJECTION INTRAMUSCULAR; INTRAVENOUS EVERY 6 HOURS PRN
Status: DISCONTINUED | OUTPATIENT
Start: 2024-01-10 | End: 2024-01-11 | Stop reason: HOSPADM

## 2024-01-10 RX ORDER — MIDAZOLAM HYDROCHLORIDE 2 MG/2ML
2 INJECTION, SOLUTION INTRAMUSCULAR; INTRAVENOUS ONCE
Status: COMPLETED | OUTPATIENT
Start: 2024-01-10 | End: 2024-01-10

## 2024-01-10 RX ORDER — CEFAZOLIN SODIUM 2 G/100ML
2000 INJECTION, SOLUTION INTRAVENOUS EVERY 8 HOURS
Qty: 200 ML | Refills: 0 | Status: COMPLETED | OUTPATIENT
Start: 2024-01-10 | End: 2024-01-11

## 2024-01-10 RX ORDER — SODIUM CHLORIDE 9 MG/ML
40 INJECTION, SOLUTION INTRAVENOUS AS NEEDED
Status: DISCONTINUED | OUTPATIENT
Start: 2024-01-10 | End: 2024-01-11 | Stop reason: HOSPADM

## 2024-01-10 RX ORDER — CEFAZOLIN SODIUM 2 G/100ML
2 INJECTION, SOLUTION INTRAVENOUS ONCE
Status: COMPLETED | OUTPATIENT
Start: 2024-01-10 | End: 2024-01-10

## 2024-01-10 RX ORDER — PANTOPRAZOLE SODIUM 40 MG/1
40 TABLET, DELAYED RELEASE ORAL
Status: DISCONTINUED | OUTPATIENT
Start: 2024-01-10 | End: 2024-01-11 | Stop reason: HOSPADM

## 2024-01-10 RX ORDER — MORPHINE SULFATE 2 MG/ML
2 INJECTION, SOLUTION INTRAMUSCULAR; INTRAVENOUS EVERY 4 HOURS PRN
Status: DISCONTINUED | OUTPATIENT
Start: 2024-01-10 | End: 2024-01-11 | Stop reason: HOSPADM

## 2024-01-10 RX ORDER — PROMETHAZINE HYDROCHLORIDE 12.5 MG/1
25 TABLET ORAL ONCE AS NEEDED
Status: DISCONTINUED | OUTPATIENT
Start: 2024-01-10 | End: 2024-01-10

## 2024-01-10 RX ORDER — INSULIN LISPRO 100 [IU]/ML
2-7 INJECTION, SOLUTION INTRAVENOUS; SUBCUTANEOUS
Status: DISCONTINUED | OUTPATIENT
Start: 2024-01-10 | End: 2024-01-11 | Stop reason: HOSPADM

## 2024-01-10 RX ORDER — GLYCOPYRROLATE 0.2 MG/ML
INJECTION INTRAMUSCULAR; INTRAVENOUS AS NEEDED
Status: DISCONTINUED | OUTPATIENT
Start: 2024-01-10 | End: 2024-01-10 | Stop reason: SURG

## 2024-01-10 RX ORDER — TRIAMCINOLONE ACETONIDE 1 MG/G
1 CREAM TOPICAL 2 TIMES DAILY
Status: DISCONTINUED | OUTPATIENT
Start: 2024-01-10 | End: 2024-01-11 | Stop reason: HOSPADM

## 2024-01-10 RX ORDER — SUCCINYLCHOLINE/SOD CL,ISO/PF 100 MG/5ML
SYRINGE (ML) INTRAVENOUS AS NEEDED
Status: DISCONTINUED | OUTPATIENT
Start: 2024-01-10 | End: 2024-01-10 | Stop reason: SURG

## 2024-01-10 RX ORDER — ATORVASTATIN CALCIUM 40 MG/1
40 TABLET, FILM COATED ORAL NIGHTLY
Status: DISCONTINUED | OUTPATIENT
Start: 2024-01-10 | End: 2024-01-11 | Stop reason: HOSPADM

## 2024-01-10 RX ORDER — ATENOLOL 50 MG/1
50 TABLET ORAL NIGHTLY
Status: DISCONTINUED | OUTPATIENT
Start: 2024-01-10 | End: 2024-01-11 | Stop reason: HOSPADM

## 2024-01-10 RX ORDER — ROCURONIUM BROMIDE 10 MG/ML
INJECTION, SOLUTION INTRAVENOUS AS NEEDED
Status: DISCONTINUED | OUTPATIENT
Start: 2024-01-10 | End: 2024-01-10 | Stop reason: SURG

## 2024-01-10 RX ORDER — PROPOFOL 10 MG/ML
VIAL (ML) INTRAVENOUS AS NEEDED
Status: DISCONTINUED | OUTPATIENT
Start: 2024-01-10 | End: 2024-01-10 | Stop reason: SURG

## 2024-01-10 RX ORDER — ONDANSETRON 2 MG/ML
4 INJECTION INTRAMUSCULAR; INTRAVENOUS ONCE AS NEEDED
Status: DISCONTINUED | OUTPATIENT
Start: 2024-01-10 | End: 2024-01-10

## 2024-01-10 RX ORDER — LIDOCAINE HYDROCHLORIDE 20 MG/ML
INJECTION, SOLUTION EPIDURAL; INFILTRATION; INTRACAUDAL; PERINEURAL AS NEEDED
Status: DISCONTINUED | OUTPATIENT
Start: 2024-01-10 | End: 2024-01-10 | Stop reason: SURG

## 2024-01-10 RX ORDER — DEXAMETHASONE SODIUM PHOSPHATE 4 MG/ML
INJECTION, SOLUTION INTRA-ARTICULAR; INTRALESIONAL; INTRAMUSCULAR; INTRAVENOUS; SOFT TISSUE AS NEEDED
Status: DISCONTINUED | OUTPATIENT
Start: 2024-01-10 | End: 2024-01-10 | Stop reason: SURG

## 2024-01-10 RX ORDER — FAMOTIDINE 10 MG/ML
20 INJECTION, SOLUTION INTRAVENOUS
Status: COMPLETED | OUTPATIENT
Start: 2024-01-10 | End: 2024-01-10

## 2024-01-10 RX ORDER — HYDROCHLOROTHIAZIDE 12.5 MG/1
12.5 TABLET ORAL
Status: DISCONTINUED | OUTPATIENT
Start: 2024-01-10 | End: 2024-01-11 | Stop reason: HOSPADM

## 2024-01-10 RX ORDER — SODIUM CHLORIDE, SODIUM LACTATE, POTASSIUM CHLORIDE, CALCIUM CHLORIDE 600; 310; 30; 20 MG/100ML; MG/100ML; MG/100ML; MG/100ML
9 INJECTION, SOLUTION INTRAVENOUS CONTINUOUS PRN
Status: DISCONTINUED | OUTPATIENT
Start: 2024-01-10 | End: 2024-01-10

## 2024-01-10 RX ORDER — LIDOCAINE HYDROCHLORIDE AND EPINEPHRINE 10; 10 MG/ML; UG/ML
INJECTION, SOLUTION INFILTRATION; PERINEURAL AS NEEDED
Status: DISCONTINUED | OUTPATIENT
Start: 2024-01-10 | End: 2024-01-10 | Stop reason: HOSPADM

## 2024-01-10 RX ORDER — DEXTROSE MONOHYDRATE 25 G/50ML
25 INJECTION, SOLUTION INTRAVENOUS
Status: DISCONTINUED | OUTPATIENT
Start: 2024-01-10 | End: 2024-01-11 | Stop reason: HOSPADM

## 2024-01-10 RX ORDER — PHENYLEPHRINE HCL IN 0.9% NACL 1 MG/10 ML
SYRINGE (ML) INTRAVENOUS AS NEEDED
Status: DISCONTINUED | OUTPATIENT
Start: 2024-01-10 | End: 2024-01-10 | Stop reason: SURG

## 2024-01-10 RX ORDER — PROMETHAZINE HYDROCHLORIDE 25 MG/1
25 SUPPOSITORY RECTAL ONCE AS NEEDED
Status: DISCONTINUED | OUTPATIENT
Start: 2024-01-10 | End: 2024-01-10

## 2024-01-10 RX ORDER — ONDANSETRON 2 MG/ML
INJECTION INTRAMUSCULAR; INTRAVENOUS AS NEEDED
Status: DISCONTINUED | OUTPATIENT
Start: 2024-01-10 | End: 2024-01-10 | Stop reason: SURG

## 2024-01-10 RX ORDER — LISINOPRIL 10 MG/1
10 TABLET ORAL
Status: DISCONTINUED | OUTPATIENT
Start: 2024-01-10 | End: 2024-01-11 | Stop reason: HOSPADM

## 2024-01-10 RX ORDER — METOCLOPRAMIDE HYDROCHLORIDE 5 MG/ML
10 INJECTION INTRAMUSCULAR; INTRAVENOUS ONCE AS NEEDED
Status: COMPLETED | OUTPATIENT
Start: 2024-01-10 | End: 2024-01-10

## 2024-01-10 RX ORDER — IBUPROFEN 600 MG/1
1 TABLET ORAL
Status: DISCONTINUED | OUTPATIENT
Start: 2024-01-10 | End: 2024-01-11 | Stop reason: HOSPADM

## 2024-01-10 RX ORDER — NALOXONE HCL 0.4 MG/ML
0.4 VIAL (ML) INJECTION
Status: DISCONTINUED | OUTPATIENT
Start: 2024-01-10 | End: 2024-01-11 | Stop reason: HOSPADM

## 2024-01-10 RX ORDER — OXYCODONE HYDROCHLORIDE AND ACETAMINOPHEN 5; 325 MG/1; MG/1
1 TABLET ORAL EVERY 4 HOURS PRN
Status: DISCONTINUED | OUTPATIENT
Start: 2024-01-10 | End: 2024-01-11 | Stop reason: HOSPADM

## 2024-01-10 RX ORDER — KETOROLAC TROMETHAMINE 30 MG/ML
INJECTION, SOLUTION INTRAMUSCULAR; INTRAVENOUS AS NEEDED
Status: DISCONTINUED | OUTPATIENT
Start: 2024-01-10 | End: 2024-01-10 | Stop reason: SURG

## 2024-01-10 RX ORDER — NICOTINE POLACRILEX 4 MG
15 LOZENGE BUCCAL
Status: DISCONTINUED | OUTPATIENT
Start: 2024-01-10 | End: 2024-01-11 | Stop reason: HOSPADM

## 2024-01-10 RX ORDER — MAGNESIUM HYDROXIDE 1200 MG/15ML
LIQUID ORAL AS NEEDED
Status: DISCONTINUED | OUTPATIENT
Start: 2024-01-10 | End: 2024-01-10 | Stop reason: HOSPADM

## 2024-01-10 RX ORDER — FENTANYL CITRATE 50 UG/ML
INJECTION, SOLUTION INTRAMUSCULAR; INTRAVENOUS AS NEEDED
Status: DISCONTINUED | OUTPATIENT
Start: 2024-01-10 | End: 2024-01-10 | Stop reason: SURG

## 2024-01-10 RX ORDER — OXYCODONE HYDROCHLORIDE 5 MG/1
5 TABLET ORAL
Status: DISCONTINUED | OUTPATIENT
Start: 2024-01-10 | End: 2024-01-10

## 2024-01-10 RX ORDER — SODIUM CHLORIDE 9 MG/ML
50 INJECTION, SOLUTION INTRAVENOUS CONTINUOUS
Status: DISCONTINUED | OUTPATIENT
Start: 2024-01-10 | End: 2024-01-11 | Stop reason: HOSPADM

## 2024-01-10 RX ORDER — CETIRIZINE HYDROCHLORIDE 10 MG/1
10 TABLET ORAL DAILY
Status: DISCONTINUED | OUTPATIENT
Start: 2024-01-10 | End: 2024-01-11 | Stop reason: HOSPADM

## 2024-01-10 RX ORDER — MEPERIDINE HYDROCHLORIDE 25 MG/ML
12.5 INJECTION INTRAMUSCULAR; INTRAVENOUS; SUBCUTANEOUS
Status: DISCONTINUED | OUTPATIENT
Start: 2024-01-10 | End: 2024-01-10

## 2024-01-10 RX ORDER — ACETAMINOPHEN 500 MG
1000 TABLET ORAL ONCE
Status: COMPLETED | OUTPATIENT
Start: 2024-01-10 | End: 2024-01-10

## 2024-01-10 RX ADMIN — Medication 100 MG: at 11:24

## 2024-01-10 RX ADMIN — ROCURONIUM BROMIDE 20 MG: 10 INJECTION, SOLUTION INTRAVENOUS at 11:43

## 2024-01-10 RX ADMIN — ROCURONIUM BROMIDE 50 MG: 10 INJECTION, SOLUTION INTRAVENOUS at 11:30

## 2024-01-10 RX ADMIN — DEXAMETHASONE SODIUM PHOSPHATE 4 MG: 4 INJECTION, SOLUTION INTRAMUSCULAR; INTRAVENOUS at 11:39

## 2024-01-10 RX ADMIN — Medication 100 MCG: at 11:42

## 2024-01-10 RX ADMIN — LIDOCAINE HYDROCHLORIDE 100 MG: 20 INJECTION, SOLUTION EPIDURAL; INFILTRATION; INTRACAUDAL; PERINEURAL at 11:22

## 2024-01-10 RX ADMIN — DOCUSATE SODIUM 100 MG: 100 CAPSULE, LIQUID FILLED ORAL at 15:09

## 2024-01-10 RX ADMIN — PROPOFOL 150 MG: 10 INJECTION, EMULSION INTRAVENOUS at 11:22

## 2024-01-10 RX ADMIN — METOCLOPRAMIDE HYDROCHLORIDE 10 MG: 5 INJECTION INTRAMUSCULAR; INTRAVENOUS at 10:51

## 2024-01-10 RX ADMIN — TRIAMCINOLONE ACETONIDE 1 APPLICATION: 1 CREAM TOPICAL at 21:36

## 2024-01-10 RX ADMIN — HYDROCHLOROTHIAZIDE 12.5 MG: 12.5 TABLET ORAL at 15:06

## 2024-01-10 RX ADMIN — ONDANSETRON 4 MG: 2 INJECTION INTRAMUSCULAR; INTRAVENOUS at 11:39

## 2024-01-10 RX ADMIN — LISINOPRIL 10 MG: 10 TABLET ORAL at 15:07

## 2024-01-10 RX ADMIN — SODIUM CHLORIDE, POTASSIUM CHLORIDE, SODIUM LACTATE AND CALCIUM CHLORIDE 9 ML/HR: 600; 310; 30; 20 INJECTION, SOLUTION INTRAVENOUS at 10:26

## 2024-01-10 RX ADMIN — IPRATROPIUM BROMIDE 2 SPRAY: 42 SPRAY NASAL at 21:38

## 2024-01-10 RX ADMIN — GLYCOPYRROLATE 0.2 MG: 0.2 INJECTION INTRAMUSCULAR; INTRAVENOUS at 11:39

## 2024-01-10 RX ADMIN — EMPAGLIFLOZIN 25 MG: 25 TABLET, FILM COATED ORAL at 15:07

## 2024-01-10 RX ADMIN — ATENOLOL 50 MG: 50 TABLET ORAL at 20:17

## 2024-01-10 RX ADMIN — ACETAMINOPHEN 1000 MG: 500 TABLET ORAL at 10:25

## 2024-01-10 RX ADMIN — PANTOPRAZOLE SODIUM 40 MG: 40 TABLET, DELAYED RELEASE ORAL at 15:06

## 2024-01-10 RX ADMIN — PREGABALIN 200 MG: 100 CAPSULE ORAL at 20:16

## 2024-01-10 RX ADMIN — Medication 50 MCG: at 12:34

## 2024-01-10 RX ADMIN — CETIRIZINE HYDROCHLORIDE 10 MG: 10 TABLET, FILM COATED ORAL at 15:06

## 2024-01-10 RX ADMIN — KETOROLAC TROMETHAMINE 30 MG: 30 INJECTION, SOLUTION INTRAMUSCULAR; INTRAVENOUS at 11:39

## 2024-01-10 RX ADMIN — SUGAMMADEX 100 MG: 100 INJECTION, SOLUTION INTRAVENOUS at 12:45

## 2024-01-10 RX ADMIN — DOCUSATE SODIUM 100 MG: 100 CAPSULE, LIQUID FILLED ORAL at 20:17

## 2024-01-10 RX ADMIN — MIDAZOLAM HYDROCHLORIDE 2 MG: 1 INJECTION, SOLUTION INTRAMUSCULAR; INTRAVENOUS at 10:49

## 2024-01-10 RX ADMIN — SODIUM CHLORIDE 50 ML/HR: 9 INJECTION, SOLUTION INTRAVENOUS at 15:31

## 2024-01-10 RX ADMIN — FENTANYL CITRATE 100 MCG: 50 INJECTION, SOLUTION INTRAMUSCULAR; INTRAVENOUS at 11:22

## 2024-01-10 RX ADMIN — FENTANYL CITRATE 100 MCG: 50 INJECTION, SOLUTION INTRAMUSCULAR; INTRAVENOUS at 11:55

## 2024-01-10 RX ADMIN — SODIUM CHLORIDE, POTASSIUM CHLORIDE, SODIUM LACTATE AND CALCIUM CHLORIDE: 600; 310; 30; 20 INJECTION, SOLUTION INTRAVENOUS at 12:26

## 2024-01-10 RX ADMIN — CEFAZOLIN SODIUM 2 G: 2 INJECTION, SOLUTION INTRAVENOUS at 11:26

## 2024-01-10 RX ADMIN — FAMOTIDINE 20 MG: 10 INJECTION INTRAVENOUS at 10:26

## 2024-01-10 RX ADMIN — DULOXETINE HYDROCHLORIDE 30 MG: 30 CAPSULE, DELAYED RELEASE ORAL at 15:06

## 2024-01-10 RX ADMIN — CEFAZOLIN SODIUM 2000 MG: 2 INJECTION, SOLUTION INTRAVENOUS at 19:30

## 2024-01-10 RX ADMIN — ATORVASTATIN CALCIUM 40 MG: 40 TABLET, FILM COATED ORAL at 20:17

## 2024-01-10 NOTE — ANESTHESIA POSTPROCEDURE EVALUATION
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        Post Anesthesia Care and Evaluation    Patient location during evaluation: bedside  Patient participation: complete - patient participated  Level of consciousness: awake  Pain management: adequate    Airway patency: patent  PONV Status: none  Cardiovascular status: acceptable  Respiratory status: acceptable  Hydration status: acceptable    Comments: An Anesthesiologist personally participated in the most demanding procedures (including induction and emergence if applicable) in the anesthesia plan, monitored the course of anesthesia administration at frequent intervals and remained physically present and available for immediate diagnosis and treatment of emergencies.

## 2024-01-10 NOTE — PLAN OF CARE
Goal Outcome Evaluation:         VSS.  Tolerated diet well.  A&Ox4.  No c/o pain or nausea.  Family at bedside.

## 2024-01-10 NOTE — H&P
[facility]   HISTORY AND PHYSICAL    Patient Name: Norberto Whiteside  : 1950  MRN: [id number]  Primary Care Physician:  Moreno Pereira MD  Date of admission: 1/10/2024    Subjective   Subjective     Chief Complaint: Cervical spinal stenosis with neck pain    History of Present Illness  Cervical spinal stenosis at C4-5 with signal change.  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  Secondary to his stenosis and signal change he has opted for decompression in an attempt to reduce the risk of further spinal cord injury.      Review of Systems   Musculoskeletal:  Positive for neck pain.        Personal History     Past Medical History:   Diagnosis Date    Acid reflux     Allergies     seasonal    Anxiety     Callus     Cancer     skin cancer    Diabetes mellitus     ave -140    Difficulty walking     Balancing difficulty    Hard of hearing     Hyperlipidemia     Hypertension     MI (myocardial infarction)     follows with PCP    Neck pain     Neuropathy     legs, feet, arms, hands, shoulders    Sciatica     Skin cancer     Sleep apnea     Vision loss        Past Surgical History:   Procedure Laterality Date    ANGIOPLASTY      no stents    COLONOSCOPY N/A 2022    Procedure: COLONOSCOPY with polypectomies;  Surgeon: Kanu Meade MD;  Location: [facility] ENDOSCOPY;  Service: General;  Laterality: N/A;  colon polyps       Family History: family history includes Cancer in his father, mother, and sister; Neuropathy in his brother; Stroke in his mother. Otherwise pertinent FHx was reviewed and not pertinent to current issue.    Social History:  reports that he quit smoking about 27 years ago. His smoking use included cigarettes. He started smoking about 51 years ago. He has a 24.00 pack-year smoking history. He has been exposed to tobacco smoke. He has never used smokeless tobacco. He reports current alcohol use of about 2.0 standard drinks of alcohol per week. He reports that he  "does not currently use drugs after having used the following drugs: Marijuana. Frequency: 3.00 times per week.    Home Medications:  B Complex Vitamins, DULoxetine, Dulaglutide, Insulin Glargine, Lancets, Multiple Vitamins-Minerals, NON FORMULARY, Pen Needles 3/16\", aspirin, atenolol, atorvastatin, docusate sodium, empagliflozin, fluorouracil, glucose blood, ipratropium, lisinopril-hydrochlorothiazide, loratadine, metFORMIN, multivitamin with minerals, pantoprazole, pregabalin, traMADol, and triamcinolone    Allergies:  No Known Allergies    Objective    Objective     Vitals:   Temp:  [98.2 °F (36.8 °C)-99 °F (37.2 °C)] 99 °F (37.2 °C)  Heart Rate:  [77-80] 77  Resp:  [20] 20  BP: (110-137)/(60-68) 137/68    Physical Exam  Constitutional:       Appearance: He is normal weight.   Cardiovascular:      Comments: No notable lower extremity edema  Pulmonary:      Effort: Pulmonary effort is normal.   Skin:     General: Skin is warm and dry.   Neurological:      Mental Status: He is alert.   Psychiatric:         Mood and Affect: Mood normal.         Assessment & Plan   Assessment / Plan     Brief Patient Summary:  Norberto Whiteside is a 73 y.o. male who has cervical 4-cervical 5 spinal stenosis with signal change within the spinal cord.    Active Hospital Problems:  Active Hospital Problems    Diagnosis     **Herniated nucleus pulposus, C4-5      Plan:   OR today for anterior cervical discectomy and fusion using allograft bone and instrumentation, right approach, cervical 4-cervical 5.  Risk and benefits discussed with patient.  He desires to proceed.    DVT prophylaxis:  Mechanical DVT prophylaxis orders are present.    CODE STATUS:       Admission Status:  I believe this patient meets outpatient in a bed status.    Konstantin Michael MD  "

## 2024-01-10 NOTE — ANESTHESIA PREPROCEDURE EVALUATION
Anesthesia Evaluation     Patient summary reviewed and Nursing notes reviewed   no history of anesthetic complications:   NPO Solid Status: > 8 hours  NPO Liquid Status: > 2 hours           Airway   Mallampati: II  TM distance: >3 FB  Neck ROM: full  No difficulty expected  Dental      Pulmonary - normal exam    breath sounds clear to auscultation  (+) ,sleep apnea  Cardiovascular - normal exam  Exercise tolerance: good (4-7 METS)    Rhythm: regular    (+) hypertension, past MI , CAD, angina, hyperlipidemia      Neuro/Psych  (+) numbness, psychiatric history  GI/Hepatic/Renal/Endo    (+) obesity, GERD, diabetes mellitus    Musculoskeletal     (+) neck pain  Abdominal    Substance History - negative use     OB/GYN negative ob/gyn ROS         Other   arthritis,   history of cancer    ROS/Med Hx Other: PAT Nursing Notes unavailable.     Balloon angioplasty in 1996, no issues since.    Took trulicity on Sunday                    Anesthesia Plan    ASA 3     general   total IV anesthesia  (Patient understands anesthesia not responsible for dental damage.  Took Trulicity on Sunday, no symptoms of bloating or N/V.  Discussed at length aspiration risk, pt voices understanding and wishes to proceed)  intravenous induction     Anesthetic plan, risks, benefits, and alternatives have been provided, discussed and informed consent has been obtained with: patient.    Use of blood products discussed with patient .    Plan discussed with CRNA.

## 2024-01-11 VITALS
BODY MASS INDEX: 31.5 KG/M2 | WEIGHT: 195.99 LBS | RESPIRATION RATE: 17 BRPM | DIASTOLIC BLOOD PRESSURE: 74 MMHG | SYSTOLIC BLOOD PRESSURE: 133 MMHG | HEART RATE: 76 BPM | OXYGEN SATURATION: 94 % | HEIGHT: 66 IN | TEMPERATURE: 97.7 F

## 2024-01-11 PROBLEM — M48.02 CERVICAL SPINAL STENOSIS: Status: RESOLVED | Noted: 2024-01-10 | Resolved: 2024-01-11

## 2024-01-11 LAB — GLUCOSE BLDC GLUCOMTR-MCNC: 97 MG/DL (ref 70–99)

## 2024-01-11 PROCEDURE — 25010000002 CEFAZOLIN IN DEXTROSE 2-4 GM/100ML-% SOLUTION: Performed by: NEUROLOGICAL SURGERY

## 2024-01-11 PROCEDURE — A9270 NON-COVERED ITEM OR SERVICE: HCPCS | Performed by: NEUROLOGICAL SURGERY

## 2024-01-11 PROCEDURE — 63710000001 HYDROCHLOROTHIAZIDE 12.5 MG TABLET: Performed by: NEUROLOGICAL SURGERY

## 2024-01-11 PROCEDURE — 63710000001 DULOXETINE 30 MG CAPSULE DELAYED-RELEASE PARTICLES: Performed by: NEUROLOGICAL SURGERY

## 2024-01-11 PROCEDURE — 63710000001 OXYCODONE-ACETAMINOPHEN 5-325 MG TABLET: Performed by: NEUROLOGICAL SURGERY

## 2024-01-11 PROCEDURE — 63710000001 PREGABALIN 100 MG CAPSULE: Performed by: NEUROLOGICAL SURGERY

## 2024-01-11 PROCEDURE — 63710000001 LISINOPRIL 10 MG TABLET: Performed by: NEUROLOGICAL SURGERY

## 2024-01-11 PROCEDURE — 63710000001 TRIAMCINOLONE 0.1 % CREAM 15 G TUBE: Performed by: NEUROLOGICAL SURGERY

## 2024-01-11 PROCEDURE — 82948 REAGENT STRIP/BLOOD GLUCOSE: CPT

## 2024-01-11 PROCEDURE — 63710000001 PANTOPRAZOLE 40 MG TABLET DELAYED-RELEASE: Performed by: NEUROLOGICAL SURGERY

## 2024-01-11 PROCEDURE — 63710000001 EMPAGLIFLOZIN 25 MG TABLET: Performed by: NEUROLOGICAL SURGERY

## 2024-01-11 PROCEDURE — 63710000001 CETIRIZINE 10 MG TABLET: Performed by: NEUROLOGICAL SURGERY

## 2024-01-11 PROCEDURE — 63710000001 DOCUSATE SODIUM 100 MG CAPSULE: Performed by: NEUROLOGICAL SURGERY

## 2024-01-11 RX ORDER — OXYCODONE HYDROCHLORIDE AND ACETAMINOPHEN 5; 325 MG/1; MG/1
1 TABLET ORAL EVERY 6 HOURS PRN
Qty: 20 TABLET | Refills: 0 | Status: SHIPPED | OUTPATIENT
Start: 2024-01-11

## 2024-01-11 RX ORDER — DIPHENOXYLATE HYDROCHLORIDE AND ATROPINE SULFATE 2.5; .025 MG/1; MG/1
1 TABLET ORAL DAILY
COMMUNITY

## 2024-01-11 RX ADMIN — PREGABALIN 200 MG: 100 CAPSULE ORAL at 08:07

## 2024-01-11 RX ADMIN — OXYCODONE HYDROCHLORIDE AND ACETAMINOPHEN 1 TABLET: 5; 325 TABLET ORAL at 04:14

## 2024-01-11 RX ADMIN — HYDROCHLOROTHIAZIDE 12.5 MG: 12.5 TABLET ORAL at 08:07

## 2024-01-11 RX ADMIN — DOCUSATE SODIUM 100 MG: 100 CAPSULE, LIQUID FILLED ORAL at 08:08

## 2024-01-11 RX ADMIN — CEFAZOLIN SODIUM 2000 MG: 2 INJECTION, SOLUTION INTRAVENOUS at 04:10

## 2024-01-11 RX ADMIN — CETIRIZINE HYDROCHLORIDE 10 MG: 10 TABLET, FILM COATED ORAL at 08:08

## 2024-01-11 RX ADMIN — TRIAMCINOLONE ACETONIDE 1 APPLICATION: 1 CREAM TOPICAL at 08:12

## 2024-01-11 RX ADMIN — DULOXETINE HYDROCHLORIDE 30 MG: 30 CAPSULE, DELAYED RELEASE ORAL at 08:07

## 2024-01-11 RX ADMIN — EMPAGLIFLOZIN 25 MG: 25 TABLET, FILM COATED ORAL at 08:07

## 2024-01-11 RX ADMIN — PANTOPRAZOLE SODIUM 40 MG: 40 TABLET, DELAYED RELEASE ORAL at 05:00

## 2024-01-11 RX ADMIN — LISINOPRIL 10 MG: 10 TABLET ORAL at 08:07

## 2024-01-11 NOTE — DISCHARGE INSTR - ACTIVITY
Call the office or go to the ER if:  Fever over 101  Flu-like symptoms  Redness, swelling, foul odor or pus-like drainage from incision  Uncontrolled pain or bleeding  New numbness in the extremities  Inability to control bowel or bladder

## 2024-01-11 NOTE — PLAN OF CARE
Goal Outcome Evaluation:            VSS.  UOP and BM.  No c/o pain or nausea.  Discharge home today; self care.

## 2024-01-11 NOTE — DISCHARGE SUMMARY
Date of Discharge:  1/11/2024    Discharge Diagnosis:   Status post ACDF    Problem List:  Active Hospital Problems   No active problems to display.      Resolved Hospital Problems    Diagnosis Date Resolved POA    **Herniated nucleus pulposus, C4-5 [M50.221] 01/10/2024 Yes    Cervical spinal stenosis [M48.02] 01/11/2024 Yes       Presenting Problem/History of Present Illness  Herniated nucleus pulposus, C4-5 [M50.221]  Cervical spinal stenosis [M48.02]      Hospital Course  Patient is a 73 y.o. male presented with C4-5 spinal stenosis, status post ACDF.  He tolerated surgery well and was admitted postoperatively.  He was able to ambulate, tolerate p.o. and his pain was adequately controlled with oral medication.  He desired discharge.    Procedures Performed    Procedure(s):  ANTERIOR CERVICAL DISCECTOMY AND FUSION USING ALLOGRAFT BONE AND INSTRUMENTATION, right approach, 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)  -------------------       Consults:   Consults       No orders found for last 30 day(s).          Condition on Discharge: Stable    Vital Signs  Temp:  [97.1 °F (36.2 °C)-99 °F (37.2 °C)] 97.7 °F (36.5 °C)  Heart Rate:  [] 76  Resp:  [14-20] 17  BP: (110-137)/(51-79) 133/74    Discharge Disposition  Home or Self Care    Discharge Medications     Discharge Medications        New Medications        Instructions Start Date   oxyCODONE-acetaminophen 5-325 MG per tablet  Commonly known as: PERCOCET   1 tablet, Oral, Every 6 Hours PRN             Changes to Medications        Instructions Start Date   atorvastatin 40 MG tablet  Commonly known as: LIPITOR  What changed: when to take this   40 mg, Oral, Nightly             Continue These Medications        Instructions Start Date   aspirin 81 MG EC tablet   81 mg, Oral, Daily      atenolol 50 MG tablet  Commonly known as: TENORMIN   50 mg, Oral, Nightly      docusate sodium 100 MG capsule  Commonly known as: COLACE   100  "mg, Oral, 3 Times Daily      DULoxetine 30 MG capsule  Commonly known as: CYMBALTA   30 mg, Oral, Daily      empagliflozin 25 MG tablet tablet  Commonly known as: Jardiance   25 mg, Oral, Daily      fluorouracil 5 % cream  Commonly known as: EFUDEX   No dose, route, or frequency recorded.      glucose blood test strip   1 each, Other, 2 Times Daily, Use as instructed      Insulin Glargine 100 UNIT/ML injection pen  Commonly known as: LANTUS SOLOSTAR   15 Units, Subcutaneous, Daily      ipratropium 0.06 % nasal spray  Commonly known as: ATROVENT   2 sprays, Nasal, 4 Times Daily      Lancets misc   1 kit, Does not apply, 2 Times Daily      lisinopril-hydrochlorothiazide 10-12.5 MG per tablet  Commonly known as: PRINZIDE,ZESTORETIC   1 tablet, Oral, Nightly      loratadine 10 MG tablet  Commonly known as: CLARITIN   10 mg, Oral, Daily      metFORMIN 1000 MG tablet  Commonly known as: GLUCOPHAGE   1,000 mg, Oral, 2 Times Daily With Meals      ICAPS AREDS 2 PO   Oral      multivitamin with minerals tablet tablet   1 tablet, Oral, Daily      NON FORMULARY   Balance of nature      pantoprazole 40 MG EC tablet  Commonly known as: PROTONIX   40 mg, Oral, Daily      Pen Needles 3/16\" 31G X 5 MM misc   1 kit, Does not apply, Daily      pregabalin 200 MG capsule  Commonly known as: Lyrica   200 mg, Oral, 2 Times Daily      triamcinolone 0.1 % cream  Commonly known as: KENALOG   1 application , Topical, 2 Times Daily      Trulicity 3 MG/0.5ML solution pen-injector  Generic drug: Dulaglutide   3 mg, Subcutaneous, Weekly      VITAMIN B COMPLEX PO   Oral             Stop These Medications      traMADol 50 MG tablet  Commonly known as: ULTRAM              Discharge Diet       Activity at Discharge  Activity Instructions       Discharge Activity      1) No driving for 5-7 days and no longer taking narcotics.   2) May shower as soon as desired, no submerging incision.  3) Do not lift / push / pull more than 8-10 lbs.  4) Minimize " bending/twisting at the waist and jarring activity      such as lawnmower.            Follow-up Appointments  Future Appointments   Date Time Provider Department Center   1/31/2024  1:15 PM Dmitriy Mariano PA-C [facility] NS [place] [facility]   2/7/2024 10:30 AM Wayne Foster DPM [facility] POD ETWN [facility]   5/9/2024 12:00 PM Moreno Pereira MD [facility] PC H[place] [facility]     Additional Instructions for the Follow-ups that You Need to Schedule       Notify Physician or Go To The ED For the Following Conditions   As directed      New numbness, weakness, difficulty controlling bowel or bladder function or other concerns.    Order Comments: New numbness, weakness, difficulty controlling bowel or bladder function or other concerns.                 Test Results Pending at Discharge      Konstantin Michael MD

## 2024-01-15 ENCOUNTER — OFFICE VISIT (OUTPATIENT)
Dept: INTERNAL MEDICINE | Facility: CLINIC | Age: 74
End: 2024-01-15
Payer: MEDICARE

## 2024-01-15 VITALS
BODY MASS INDEX: 30.44 KG/M2 | WEIGHT: 189.4 LBS | SYSTOLIC BLOOD PRESSURE: 122 MMHG | DIASTOLIC BLOOD PRESSURE: 75 MMHG | OXYGEN SATURATION: 95 % | HEART RATE: 84 BPM | TEMPERATURE: 98.4 F | HEIGHT: 66 IN

## 2024-01-15 DIAGNOSIS — I10 PRIMARY HYPERTENSION: ICD-10-CM

## 2024-01-15 DIAGNOSIS — Z09 HOSPITAL DISCHARGE FOLLOW-UP: Primary | ICD-10-CM

## 2024-01-15 DIAGNOSIS — J01.00 ACUTE NON-RECURRENT MAXILLARY SINUSITIS: ICD-10-CM

## 2024-01-15 DIAGNOSIS — M48.02 CERVICAL SPINAL STENOSIS: ICD-10-CM

## 2024-01-15 RX ORDER — AMOXICILLIN AND CLAVULANATE POTASSIUM 875; 125 MG/1; MG/1
1 TABLET, FILM COATED ORAL 2 TIMES DAILY
Qty: 20 TABLET | Refills: 0 | Status: SHIPPED | OUTPATIENT
Start: 2024-01-15

## 2024-01-15 RX ORDER — CEPHALEXIN 500 MG/1
500 CAPSULE ORAL 3 TIMES DAILY
Qty: 21 CAPSULE | Refills: 0 | Status: SHIPPED | OUTPATIENT
Start: 2024-01-15 | End: 2024-01-15

## 2024-01-15 NOTE — ASSESSMENT & PLAN NOTE
This is also an issue as of his 1/24 hospital follow-up.  Having some sinus congestion etc.  We will try to treat this with over-the-counter antihistamines as well as antibiotic.  Trying to avoid corticosteroids in regards to wound healing and a diabetic.

## 2024-01-15 NOTE — PATIENT INSTRUCTIONS
Get cetirizine/Zyrtec 10 mg over-the-counter take this in the morning.    2.  Also  diphenhydramine/Benadryl 25 mg and take this in the evening.    3.  I sent over an antibiotic Augmentin that you will take twice a day.

## 2024-01-15 NOTE — ASSESSMENT & PLAN NOTE
Patient is being seen 4 days after his anterior cervical fusion by Dr. Michael.  He is having some swelling and tenderness at the proximal end of his incision.  There is no discharge, no significant erythema.  He is having some issues with swallowing, it sore, possibly just related to the intubation etc.  Not having any fevers.    He was given empiric perioperative IV antibiotics, we will go ahead and cover him empirically now as well.  Patient will get ahold of us or Dr. Michael if symptoms progress.

## 2024-01-15 NOTE — PROGRESS NOTES
"Chief Complaint  Hospital Follow Up Visit    Subjective          Norberto Whiteside presents to [facility]     History of present illness:  Patient 73-year-old male with underlying diabetes mellitus, hypertension, hyperlipidemia, resultant coronary artery disease with prior stent, who is coming in 1/24 for routine 3-4-month follow-up.  We will review his labs and make further recommendations at that time.  ---> Patient being seen for hospital follow-up 1/24, underwent ACDF for C4-5 spinal stenosis on 1/11/24 per Dr. Michael.    Review of Systems   Constitutional:  Negative for appetite change, fatigue and fever.   HENT:  Negative for congestion and ear pain.    Eyes:  Negative for blurred vision.   Respiratory:  Negative for cough, chest tightness, shortness of breath and wheezing.    Cardiovascular:  Negative for chest pain, palpitations and leg swelling.   Gastrointestinal:  Negative for abdominal pain.   Genitourinary:  Negative for difficulty urinating, dysuria and hematuria.   Musculoskeletal:  Negative for arthralgias and gait problem.   Skin:  Negative for skin lesions.   Neurological:  Negative for syncope, memory problem and confusion.   Psychiatric/Behavioral:  Negative for self-injury and depressed mood.        Objective   Vital Signs:   /75   Pulse 84   Temp 98.4 °F (36.9 °C)   Ht 167.6 cm (66\")   Wt 85.9 kg (189 lb 6.4 oz)   SpO2 95%   BMI 30.57 kg/m²           Physical Exam  Vitals and nursing note reviewed.   Constitutional:       General: He is not in acute distress.     Appearance: Normal appearance. He is not toxic-appearing.   HENT:      Head: Atraumatic.      Right Ear: External ear normal.      Left Ear: External ear normal.      Nose: Nose normal.      Mouth/Throat:      Mouth: Mucous membranes are moist.   Eyes:      General:         Right eye: No discharge.         Left eye: No discharge.      Extraocular Movements: Extraocular movements intact.      " Pupils: Pupils are equal, round, and reactive to light.   Cardiovascular:      Rate and Rhythm: Normal rate and regular rhythm.      Pulses: Normal pulses.      Heart sounds: Normal heart sounds. No murmur heard.     No gallop.   Pulmonary:      Effort: Pulmonary effort is normal. No respiratory distress.      Breath sounds: No wheezing, rhonchi or rales.   Abdominal:      General: There is no distension.      Palpations: Abdomen is soft. There is no mass.      Tenderness: There is no abdominal tenderness. There is no guarding.   Musculoskeletal:         General: No swelling or tenderness.      Cervical back: No tenderness.      Right lower leg: No edema.      Left lower leg: No edema.   Skin:     General: Skin is warm and dry.      Findings: No rash.   Neurological:      General: No focal deficit present.      Mental Status: He is alert and oriented to person, place, and time. Mental status is at baseline.      Motor: No weakness.      Gait: Gait normal.   Psychiatric:         Mood and Affect: Mood normal.         Thought Content: Thought content normal.          Result Review :   The following data was reviewed by: Moreno Pereira MD on 10/05/2021:                  Assessment and Plan    Diagnoses and all orders for this visit:    1. Hospital discharge follow-up (Primary)  Assessment & Plan:  Patient is being seen 4 days after his anterior cervical fusion by Dr. Michael.  He is having some swelling and tenderness at the proximal end of his incision.  There is no discharge, no significant erythema.  He is having some issues with swallowing, it sore, possibly just related to the intubation etc.  Not having any fevers.    He was given empiric perioperative IV antibiotics, we will go ahead and cover him empirically now as well.  Patient will get ahold of us or Dr. Michael if symptoms progress.      2. Cervical spinal stenosis  Overview:  S/P ACDF 1/24  (anterior cervical discectomy and fusion)     Assessment &  Plan:  Surgical notes and discharge summary reviewed.  Patient has follow-up with Dr. Michael in about 2 weeks.      3. Primary hypertension  Assessment & Plan:  Blood pressure remained stable as of his 1/24 hospital follow-up.  His pulse is in the mid 80s and he is in sinus.  He will continue with low-dose lisinopril/HCT and moderate dose atenolol.      4. Acute non-recurrent maxillary sinusitis  Assessment & Plan:  This is also an issue as of his 1/24 hospital follow-up.  Having some sinus congestion etc.  We will try to treat this with over-the-counter antihistamines as well as antibiotic.  Trying to avoid corticosteroids in regards to wound healing and a diabetic.      Other orders  -     Discontinue: cephalexin (Keflex) 500 MG capsule; Take 1 capsule by mouth 3 (Three) Times a Day for 7 days.  Dispense: 21 capsule; Refill: 0  -     amoxicillin-clavulanate (AUGMENTIN) 875-125 MG per tablet; Take 1 tablet by mouth 2 (Two) Times a Day.  Dispense: 20 tablet; Refill: 0        Total Time Spent:42 minutes     This time includes time spent by me in the following activities: preparing for the visit, reviewing extensive past medical history and tests, performing a medically appropriate examination and/or evaluation, counseling and educating the patient and/or caregivers, ordering medications, tests, or procedures, referring and/or communicating with other health care professionals and documenting information in the medical record all on this date of service.         --  --  OLDER NOTES:  VISIT 6/21:  ANNUAL MEDICARE WELLNESS PHYSICAL 9/17 = reviewed all forms with pt in office; no new concerns raised.  DM = A1C as below and OPTHO=20/20 and saw them in spring '18.  --  DM 2 ('15) and was started on Tanzem 3 mo ago and low dose amaryl was stopped; needs repeat labs, but FBS still in 280 ballpark; last A1C=9.9; will increase Tanzem before add another agent...down 10.8 to 8.5 past 4 months, so no invokana yet...7.6 is great  trend, so no changes 1/18...8.5 again so needs jardiance/etc now since this is despite wt down some at 5/18 OV; also, may lose tanzem he tells me...8.2 is w/o any new agents, wt is still trending down, so will wait until after new year to add another if still in the 8's...8.4 and he will find out which one is covered...8.3 and got on Jardiance, so need to max it out as of 5/19 OV...7.9 is ok for now at least...8.1 and he blames it on his diet around holiday time; maxed out on 3 meds; will use lantus if same on RTO...8.3 and I d/w he needs Lantus now=10U qd and titrate...7.4 is nice drop already 9/20---> 7.1 is better 6/21.  (Micro-alb neg 5/20)  --  CAD s/p PTCA '96 with need for SPECT soonish=been too long it sound like; no sxs at least---> needs eval as of 6/21 for dizzy/weak/feels off;   LIPIDS with LDL 14 and TG's 400, ? lab error; will repeat prior to changes...LDL 43 with TG's < 200 is fine...LDL 37/TG's 300 baseline 12/19...LDL 27, so will lower dose now to 40 mg---> 60 is better 1/21  --  HTN remains well controlled and ok to lower to 1/2 tab ACEI/HCT if stays low at home.  ?CKD3 = 52% and will get on RTO in '19... >60%... 40% out of the blue 9/19---> 55% holding 6/21.  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  NEUROPATHY is worse 5/20 and I d/w anodyne is an option; worse when active; will use PRN ultram.  OBESITY with BMI 37 ballpark (TSH neg 5/18).  --  --  PSA 0.5 (10/19/2022)   COLON 1/22 = 2 polyps = 5 yrs per Dr Meade.  Pneumovax x1 ; Prevnar 9/17.  ( 10/21 after 32 years of marriage and she was 10 yrs older = 81 when she passeed, Sun had severe dementia at the end; retired  and then  and retired '14, 1 girl in town)    Follow Up   Return for Next scheduled follow up.  Patient was given instructions and counseling regarding his condition or for health  maintenance advice. Please see specific information pulled into the AVS if appropriate.

## 2024-01-15 NOTE — ASSESSMENT & PLAN NOTE
Surgical notes and discharge summary reviewed.  Patient has follow-up with Dr. Michael in about 2 weeks.

## 2024-01-15 NOTE — ASSESSMENT & PLAN NOTE
Blood pressure remained stable as of his 1/24 hospital follow-up.  His pulse is in the mid 80s and he is in sinus.  He will continue with low-dose lisinopril/HCT and moderate dose atenolol.

## 2024-01-31 ENCOUNTER — OFFICE VISIT (OUTPATIENT)
Dept: NEUROSURGERY | Facility: CLINIC | Age: 74
End: 2024-01-31
Payer: MEDICARE

## 2024-01-31 VITALS
HEIGHT: 66 IN | WEIGHT: 188 LBS | DIASTOLIC BLOOD PRESSURE: 67 MMHG | BODY MASS INDEX: 30.22 KG/M2 | SYSTOLIC BLOOD PRESSURE: 130 MMHG | HEART RATE: 96 BPM

## 2024-01-31 DIAGNOSIS — M50.221 HERNIATED NUCLEUS PULPOSUS, C4-5: Primary | ICD-10-CM

## 2024-01-31 DIAGNOSIS — Z98.1 STATUS POST CERVICAL SPINAL FUSION: ICD-10-CM

## 2024-01-31 NOTE — PROGRESS NOTES
Patient being seen for today for Post-op  .    Subjective    Norberto Whiteside is a 73 y.o. male that presents with Post-op  .    HPI  Previously: He is status post ACDF using right approach at C4-C5 on 1/10/2024.  The previously complained of neck pain and pain across the bilateral shoulder blades as well as numbness in hands bilaterally.  He had some spinal cord signal change at C4-C5.    Today: He denies any pain. He reports some stiffness in the neck after surgery. He reports occasional numbness in the right arm, none today.    He reports he has had some intermittent headaches, but Tylenol seems to manage this well.    He denies any other new complaints.    He has been following restrictions.    He rarely has been using the post-op percocet.     reports that he quit smoking about 27 years ago. His smoking use included cigarettes. He started smoking about 51 years ago. He has a 24.00 pack-year smoking history. He has been exposed to tobacco smoke. He has never used smokeless tobacco.    Review of Systems   Musculoskeletal:  Positive for neck pain.   Neurological:  Positive for numbness. Negative for weakness.       Objective   Vitals:    01/31/24 1325   BP: 130/67   Pulse: 96        Physical Exam  Constitutional:       Appearance: Normal appearance. He is obese.   Neck:      Comments: Pain with ROM  Pulmonary:      Effort: Pulmonary effort is normal.   Musculoskeletal:      Comments: Petty's negative bilaterally   Skin:     Comments: Anterior cervical incision is well-healed without erythema or discharge   Neurological:      General: No focal deficit present.      Mental Status: He is alert and oriented to person, place, and time.      Sensory: No sensory deficit.      Motor: No weakness.      Deep Tendon Reflexes: Reflexes normal.   Psychiatric:         Mood and Affect: Mood normal.         Behavior: Behavior normal.          Result Review   None.     Assessment and Plan {CC Problem List  Visit Diagnosis  ROS   Review (Popup)  [facility]  BestPractice  Medications  SmartSets  SnapShot Encounters  Media :23}   Diagnoses and all orders for this visit:    1. Herniated nucleus pulposus, C4-5 (Primary)  -     XR Spine Cervical Complete 4 or 5 View; Future    2. Status post cervical spinal fusion  -     XR Spine Cervical Complete 4 or 5 View; Future    He will monitor for new or worsening symptoms and notify us of change.    He will continue physical restrictions for the next 9 weeks, then he may resume normal activity as tolerated.    I am recommending the following restrictions: No heavy lifting greater than 10 lb, limit twisting and bending at the waist, avoidance of strenuous activity.    I will order an x-ray of the cervical spine to monitor the ACDF at C4-C5. He will complete this 2-3 days prior to his follow-up.    He will follow-up in 9 weeks to reassess and review imaging, sooner if needed.  Follow Up {Instructions Charge Capture  Follow-up Communications :23}   Return in about 9 weeks (around 4/3/2024).

## 2024-02-07 ENCOUNTER — OFFICE VISIT (OUTPATIENT)
Dept: PODIATRY | Facility: CLINIC | Age: 74
End: 2024-02-07
Payer: MEDICARE

## 2024-02-07 VITALS
OXYGEN SATURATION: 97 % | WEIGHT: 189 LBS | HEART RATE: 96 BPM | BODY MASS INDEX: 30.51 KG/M2 | DIASTOLIC BLOOD PRESSURE: 81 MMHG | TEMPERATURE: 98 F | SYSTOLIC BLOOD PRESSURE: 122 MMHG

## 2024-02-07 DIAGNOSIS — M79.671 FOOT PAIN, BILATERAL: ICD-10-CM

## 2024-02-07 DIAGNOSIS — E11.8 DIABETIC FOOT: Primary | ICD-10-CM

## 2024-02-07 DIAGNOSIS — G62.9 NEUROPATHY: ICD-10-CM

## 2024-02-07 DIAGNOSIS — E11.42 TYPE 2 DIABETES MELLITUS WITH DIABETIC POLYNEUROPATHY, WITH LONG-TERM CURRENT USE OF INSULIN: ICD-10-CM

## 2024-02-07 DIAGNOSIS — B35.1 ONYCHOMYCOSIS: ICD-10-CM

## 2024-02-07 DIAGNOSIS — M79.672 FOOT PAIN, BILATERAL: ICD-10-CM

## 2024-02-07 DIAGNOSIS — L60.0 ONYCHOCRYPTOSIS: ICD-10-CM

## 2024-02-07 DIAGNOSIS — Z79.4 TYPE 2 DIABETES MELLITUS WITH DIABETIC POLYNEUROPATHY, WITH LONG-TERM CURRENT USE OF INSULIN: ICD-10-CM

## 2024-02-07 NOTE — PROGRESS NOTES
[facility] - PODIATRY    Today's Date: 02/07/24    Patient Name: Norberto Whiteside  MRN: [id number]  CSN: [id number]  PCP: Moreno Pereira MD, Last PCP Visit: 1/9/2024  Referring Provider: No ref. provider found    SUBJECTIVE     Chief Complaint   Patient presents with    Left Foot - Follow-up, Nail Problem    Right Foot - Follow-up, Nail Problem     HPI: Norberto Whiteside, a 73 y.o.male, comes to clinic.    New, Established, New Problem:  est    Location:  Toenails    Duration:   Greater than five years    Onset:  Gradual    Nature:  sore with palpation.    Stable, worsening, improving:   Stable    Aggravating factors:  Pain with shoe gear and ambulation.    Previous Treatment:  debridement    Patient states their most recent blood glucose reading was 138.    Medical changes: Cervical fusion neck surgery by Dr. Konstantin Michael.    Patient denies any fevers, chills, nausea, vomiting, shortness of breathe, nor any other constitutional signs nor symptoms.       Past Medical History:   Diagnosis Date    Acid reflux     Allergies     seasonal    Anxiety     Callus     Cancer     skin cancer    Diabetes mellitus     ave -140    Difficulty walking 2014    Balancing difficulty    Hard of hearing     Hyperlipidemia     Hypertension     MI (myocardial infarction) 1996    follows with PCP    Neck pain     Neuropathy     legs, feet, arms, hands, shoulders    Sciatica     Skin cancer     Sleep apnea     Vision loss 1962     Past Surgical History:   Procedure Laterality Date    ANGIOPLASTY  1996    no stents    ANTERIOR CERVICAL DISCECTOMY W/ FUSION Right 1/10/2024    Procedure: ANTERIOR CERVICAL DISCECTOMY AND FUSION USING ALLOGRAFT BONE AND INSTRUMENTATION, right approach, 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);  Surgeon: Konstantin Michael MD;  Location: [facility] MAIN OR;  Service: Neurosurgery;  Laterality: Right;    COLONOSCOPY N/A 01/12/2022    Procedure: COLONOSCOPY with  polypectomies;  Surgeon: Kanu Meade MD;  Location: [facility] ENDOSCOPY;  Service: General;  Laterality: N/A;  colon polyps     Family History   Problem Relation Age of Onset    Stroke Mother         Stroke around     Cancer Mother     Cancer Father     Cancer Sister     Neuropathy Brother     Malbrenda Hyperthermia Neg Hx      Social History     Socioeconomic History    Marital status:    Tobacco Use    Smoking status: Former     Packs/day: 1.00     Years: 24.00     Additional pack years: 0.00     Total pack years: 24.00     Types: Cigarettes     Start date: 1972     Quit date: 3/1/1996     Years since quittin.9     Passive exposure: Past    Smokeless tobacco: Never   Vaping Use    Vaping Use: Never used   Substance and Sexual Activity    Alcohol use: Yes     Alcohol/week: 2.0 standard drinks of alcohol     Types: 2 Glasses of wine per week     Comment: rare    Drug use: Not Currently     Frequency: 3.0 times per week     Types: Marijuana     Comment: Helps relieve neuropathy pain in feet and arthritis pain in    Sexual activity: Not Currently     Partners: Female     Birth control/protection: None     Comment: ED     No Known Allergies  Current Outpatient Medications   Medication Sig Dispense Refill    amoxicillin-clavulanate (AUGMENTIN) 875-125 MG per tablet Take 1 tablet by mouth 2 (Two) Times a Day. 20 tablet 0    aspirin 81 MG EC tablet Take 1 tablet by mouth Daily. 90 tablet 3    atenolol (TENORMIN) 50 MG tablet Take 1 tablet by mouth Every Night. 90 tablet 3    atorvastatin (LIPITOR) 40 MG tablet Take 1 tablet by mouth Every Night. (Patient taking differently: Take 1 tablet by mouth Daily.) 90 tablet 3    docusate sodium (COLACE) 100 MG capsule Take 1 capsule by mouth 3 (Three) Times a Day. 270 capsule 3    Dulaglutide (Trulicity) 3 MG/0.5ML solution pen-injector Inject 0.5 mL under the skin into the appropriate area as directed 1 (One) Time Per Week. (Patient taking differently: Inject 0.5  mL under the skin into the appropriate area as directed 1 (One) Time Per Week. On Sundays) 6 mL 1    DULoxetine (CYMBALTA) 30 MG capsule Take 1 capsule by mouth Daily for 30 days. (Patient taking differently: Take 1 capsule by mouth Daily. States he has not started yet) 30 capsule 1    empagliflozin (Jardiance) 25 MG tablet tablet Take 1 tablet by mouth Daily. 90 tablet 3    fluorouracil (EFUDEX) 5 % cream Apply 1 Application topically to the appropriate area as directed Daily As Needed.      Insulin Glargine (LANTUS SOLOSTAR) 100 UNIT/ML injection pen Inject 15 Units under the skin into the appropriate area as directed Daily for 200 days. (Patient taking differently: Inject 15 Units under the skin into the appropriate area as directed Daily With Lunch.) 15 mL 3    ipratropium (ATROVENT) 0.06 % nasal spray 2 sprays into the nostril(s) as directed by provider 4 (Four) Times a Day. 15 mL 3    lisinopril-hydrochlorothiazide (PRINZIDE,ZESTORETIC) 10-12.5 MG per tablet Take 1 tablet by mouth Every Night. 90 tablet 3    loratadine (CLARITIN) 10 MG tablet Take 1 tablet by mouth Daily. 90 tablet 3    metFORMIN (GLUCOPHAGE) 1000 MG tablet Take 1 tablet by mouth 2 (Two) Times a Day With Meals. 180 tablet 3    multivitamin (MULTIVITAMIN PO) Take 1 tablet by mouth Daily.      multivitamin with minerals tablet tablet Take 1 tablet by mouth Daily. 90 tablet 1    pantoprazole (PROTONIX) 40 MG EC tablet Take 1 tablet by mouth Daily. 90 tablet 3    pregabalin (Lyrica) 200 MG capsule Take 1 capsule by mouth 2 (Two) Times a Day. 180 capsule 1    triamcinolone (KENALOG) 0.1 % cream Apply 1 application topically to the appropriate area as directed 2 (Two) Times a Day. (Patient taking differently: Apply 1 Application topically to the appropriate area as directed 2 (Two) Times a Day As Needed for Irritation or Rash.) 30 g 1     No current facility-administered medications for this visit.     Review of Systems   Constitutional: Negative.     Skin:         Painful toenails   Neurological:  Positive for numbness.   All other systems reviewed and are negative.      OBJECTIVE     Vitals:    02/07/24 1008   BP: 122/81   Pulse: 96   Temp: 98 °F (36.7 °C)   SpO2: 97%           Lab Results   Component Value Date    HGBA1C 6.30 (H) 01/03/2024       Lab Results   Component Value Date    GLUCOSE 109 (H) 01/03/2024    CALCIUM 10.0 01/03/2024     01/03/2024    K 4.8 01/03/2024    CO2 28.0 01/03/2024     01/03/2024    BUN 19 01/03/2024    CREATININE 1.07 01/03/2024    EGFRIFNONA 69 01/14/2022    BCR 17.8 01/03/2024    ANIONGAP 11.0 01/03/2024       Patient seen in no apparent distress.      PHYSICAL EXAM:     Foot/Ankle Exam    GENERAL  Diabetic foot exam performed    Appearance:  elderly  Orientation:  AAOx3  Affect:  appropriate  Gait:  unimpaired  Assistance:  independent  Right shoe gear: casual shoe  Left shoe gear: casual shoe    VASCULAR     Right Foot Vascularity   Dorsalis pedis:  1+  Posterior tibial:  1+  Skin temperature:  cool  Edema grading:  None  CFT:  < 3 seconds  Pedal hair growth:  Present  Varicosities:  mild varicosities     Left Foot Vascularity   Dorsalis pedis:  1+  Posterior tibial:  1+  Skin temperature:  cool  Edema grading:  None  CFT:  < 3 seconds  Pedal hair growth:  Present  Varicosities:  mild varicosities     NEUROLOGIC     Right Foot Neurologic   Light touch sensation: diminished  Vibratory sensation: diminished  Hot/Cold sensation: diminished  Protective Sensation using [place] Monofilament:   Sites intact: 3  Sites tested: 10     Left Foot Neurologic   Light touch sensation: diminished  Vibratory sensation: diminished  Hot/Cold sensation:  diminished  Protective Sensation using [place] Monofilament:   Sites intact: 3  Sites tested: 10    MUSCLE STRENGTH     Right Foot Muscle Strength   Foot dorsiflexion:  4  Foot plantar flexion:  4  Foot inversion:  4  Foot eversion:  4     Left Foot Muscle Strength    Foot dorsiflexion:  4  Foot plantar flexion:  4  Foot inversion:  4  Foot eversion:  4    RANGE OF MOTION     Right Foot Range of Motion   Foot and ankle ROM within normal limits       Left Foot Range of Motion   Foot and ankle ROM within normal limits      DERMATOLOGIC      Right Foot Dermatologic   Skin  Right foot skin is intact.   Nails  1.  Positive for elongated, onychomycosis, abnormal thickness, subungual debris and ingrown toenail.  2.  Positive for elongated, onychomycosis, abnormal thickness, subungual debris and ingrown toenail.  3.  Positive for elongated, onychomycosis, abnormal thickness, subungual debris and ingrown toenail.  4.  Positive for elongated, onychomycosis, abnormal thickness, subungual debris and ingrown toenail.  5.  Positive for elongated, onychomycosis, abnormal thickness, subungual debris and ingrown toenail.  Nails comment:  Toenails 1, 2, 3, 4, and 5     Left Foot Dermatologic   Skin  Left foot skin is intact.   Nails comment:  Toenails 1, 2, 3, 4, and 5  Nails  1.  Positive for elongated, onychomycosis, abnormal thickness, subungual debris and ingrown toenail.  2.  Positive for elongated, onychomycosis, abnormal thickness, subungual debris and ingrown toenail.  3.  Positive for elongated, onychomycosis, abnormal thickness, subungual debris and ingrown toenail.  4.  Positive for elongated, onychomycosis, abnormally thick, subungual debris and ingrown toenail.  5.  Positive for elongated, onychomycosis, abnormally thick, subungual debris and ingrown toenail.    Diabetic Foot Exam Performed and Monofilament Test Performed    ASSESSMENT/PLAN     Diagnoses and all orders for this visit:    1. Diabetic foot (Primary)    2. Onychomycosis    3. Neuropathy    4. Onychocryptosis    5. Type 2 diabetes mellitus with diabetic polyneuropathy, with long-term current use of insulin    6. Foot pain, bilateral    Comprehensive lower extremity examination and evaluation was performed.    Discussed  findings and treatment plan including risks, benefits, and treatment options with patient in detail. Patient agreed with treatment plan.    Toenails 1 through 5 bilaterally were debrided in thickness and length and then smoothed with a Dremel Tool.  Tolerated the procedure well without complications.    Diabetic foot exam performed and documented this date, compliant with CQM required standards. Detail of findings as noted in physical exam.  Lower extremity Neurologic exam for diabetic patient performed and documented this date, compliant with PQRS required standards. Detail of findings as noted in physical exam.  Advised patient importance of good routine lower extremity hygiene. Advised patient importance of evaluating for intact skin and pain free nail borders.  Advised patient to use mirror to evaluate plantar/ soles of feet for better visualization. Advised patient monitor and phone office to be seen if any cracking to skin, open lesions, painful nail borders or if nails become elongated prior to next visit. Advised patient importance of daily cleansing of lower extremities, followed by good skin cream to maintain normal hydration of skin. Also advised patient importance of close daily monitoring of blood sugar. Advised to regulate diet and medications to maintain control of blood sugar in optimal range. Contact primary care provider if difficulties maintaining blood sugar levels.  Advised Patient of presence of Diabetes Mellitus condition.  Advised Patient risk of progression and worsening or improvement, then return of condition.  Will monitor condition for any change in future. Treat with most appropriate treatment pending status of condition.  Counseled and advised patient extensively on nature and ramifications of diabetes. Standard instructions given to patient for good diabetic foot care and maintenance. Advised importance of careful monitoring to avoid break down and complications secondary to diabetes.  Advised patient importance of strict maintenance of blood sugar control. Advised patient of possible ominous results from neglect of condition, i.e.: amputation/ loss of digits, feet and legs, or even death.  Patient states understands counseling, will monitor closely, continue good hygiene and routine diabetic foot care. Patient will contact office is questions or problems.      An After Visit Summary was printed and given to the patient at discharge, including (if requested) any available informative/educational handouts regarding diagnosis, treatment, or medications. All questions were answered to patient/family satisfaction. Should symptoms fail to improve or worsen they agree to call or return to clinic or to go to the Emergency Department. Discussed the importance of following up with any needed screening tests/labs/specialist appointments and any requested follow-up recommended by me today. Importance of maintaining follow-up discussed and patient accepts that missed appointments can delay diagnosis and potentially lead to worsening of conditions.    Return in about 9 weeks (around 4/10/2024) for Toenail Care., or sooner if acute issues arise.    This document has been electronically signed by Wayne Foster DPM on February 7, 2024 10:24 EST

## 2024-03-20 RX ORDER — DULOXETIN HYDROCHLORIDE 30 MG/1
30 CAPSULE, DELAYED RELEASE ORAL DAILY
Qty: 90 CAPSULE | Refills: 1 | Status: SHIPPED | OUTPATIENT
Start: 2024-03-20

## 2024-03-28 ENCOUNTER — HOSPITAL ENCOUNTER (OUTPATIENT)
Dept: GENERAL RADIOLOGY | Facility: HOSPITAL | Age: 74
Discharge: HOME | End: 2024-03-28
Admitting: PHYSICIAN ASSISTANT
Payer: MEDICARE

## 2024-03-28 DIAGNOSIS — M50.221 HERNIATED NUCLEUS PULPOSUS, C4-5: ICD-10-CM

## 2024-03-28 DIAGNOSIS — Z98.1 STATUS POST CERVICAL SPINAL FUSION: ICD-10-CM

## 2024-03-28 PROCEDURE — 72050 X-RAY EXAM NECK SPINE 4/5VWS: CPT

## 2024-04-01 ENCOUNTER — TELEPHONE (OUTPATIENT)
Dept: INTERNAL MEDICINE | Age: 74
End: 2024-04-01
Payer: MEDICARE

## 2024-04-01 RX ORDER — DULAGLUTIDE 3 MG/.5ML
3 INJECTION, SOLUTION SUBCUTANEOUS WEEKLY
Qty: 6 ML | Refills: 1 | Status: SHIPPED | OUTPATIENT
Start: 2024-04-01

## 2024-04-01 NOTE — TELEPHONE ENCOUNTER
Patient called asking for a refill of Trulicity be sent to [facility] pharmacy.       Pt # [phone]

## 2024-04-03 ENCOUNTER — OFFICE VISIT (OUTPATIENT)
Dept: NEUROSURGERY | Facility: CLINIC | Age: 74
End: 2024-04-03
Payer: MEDICARE

## 2024-04-03 VITALS
WEIGHT: 200 LBS | DIASTOLIC BLOOD PRESSURE: 68 MMHG | SYSTOLIC BLOOD PRESSURE: 115 MMHG | HEART RATE: 80 BPM | HEIGHT: 66 IN | BODY MASS INDEX: 32.14 KG/M2

## 2024-04-03 DIAGNOSIS — Z98.1 STATUS POST CERVICAL SPINAL FUSION: ICD-10-CM

## 2024-04-03 DIAGNOSIS — M50.221 HERNIATED NUCLEUS PULPOSUS, C4-5: Primary | ICD-10-CM

## 2024-04-03 NOTE — PROGRESS NOTES
Patient being seen for today for Follow-up  .    Subjective    Norberto Whiteside is a 73 y.o. male that presents with Follow-up  .    HPI  Previously: Last seen on 1/31/2024 for 3-week postoperative follow-up from ACDF using right approach at C4-C5 on 1/10/2024.  He was doing well at that time.  He was going to monitor for new or worsening symptoms and notify us of change.  He was going to continue with physical restrictions for at least 9 weeks and then resume normal activity.  He was going to follow-up in 9 weeks with x-ray of the cervical spine to monitor the ACDF at C4-C5.    Today: He denies arm pain. He reports pain in the neck at times. He feels ROM is improved. He has some numbness at times in the right greater than the left arm.    He denies other new or changed complaints.    He has been doing well with physical restrictions, but has been bending over at times.     reports that he quit smoking about 28 years ago. His smoking use included cigarettes. He started smoking about 51 years ago. He has a 24 pack-year smoking history. He has been exposed to tobacco smoke. He has never used smokeless tobacco.    Review of Systems   Musculoskeletal:  Positive for neck pain.   Neurological:  Positive for numbness.       Objective   Vitals:    04/03/24 1056   BP: 115/68   Pulse: 80        Physical Exam  Constitutional:       Appearance: Normal appearance. He is obese.   Neck:      Comments: Pain with extreme ROM  Pulmonary:      Effort: Pulmonary effort is normal.   Musculoskeletal:         General: No tenderness.      Comments: Petty's negative bilaterally   Skin:     Comments: Right anterior cervical incision is well-healed without erythema or discharge   Neurological:      General: No focal deficit present.      Mental Status: He is alert and oriented to person, place, and time.      Sensory: No sensory deficit.      Motor: No weakness.      Deep Tendon Reflexes: Reflexes normal.   Psychiatric:         Mood and  Affect: Mood normal.         Behavior: Behavior normal.          Result Review   I have personally reviewed the x-ray of cervical spine from 3/28/2024 which shows stable ACDF at C4-C5.     Assessment and Plan {CC Problem List  Visit Diagnosis  ROS  Review (Popup)  [facility]  Quality  BestPractice  Medications  SmartSets  SnapShot Encounters  Media :23}   Diagnoses and all orders for this visit:    1. Herniated nucleus pulposus, C4-5 (Primary)  -     XR Spine Cervical Complete 4 or 5 View; Future    2. Status post cervical spinal fusion  -     XR Spine Cervical Complete 4 or 5 View; Future    The ACDF at C4-C5 is stable on x-ray.    He may resume normal activity as tolerated.    He will monitor for new or worsening complaints and notify us of change.    He will follow-up in 3 months to reassess with x-ray of the cervical spine to monitor the ACDF at C4-C5.    Follow Up {Instructions Charge Capture  Follow-up Communications :23}   Return in about 3 months (around 7/3/2024).

## 2024-05-06 ENCOUNTER — LAB (OUTPATIENT)
Dept: LAB | Facility: HOSPITAL | Age: 74
End: 2024-05-06
Payer: MEDICARE

## 2024-05-06 DIAGNOSIS — Z12.5 PROSTATE CANCER SCREENING: ICD-10-CM

## 2024-05-06 DIAGNOSIS — I10 PRIMARY HYPERTENSION: ICD-10-CM

## 2024-05-06 DIAGNOSIS — E11.65 TYPE 2 DIABETES MELLITUS WITH HYPERGLYCEMIA, WITH LONG-TERM CURRENT USE OF INSULIN: ICD-10-CM

## 2024-05-06 DIAGNOSIS — E78.2 MIXED HYPERLIPIDEMIA: ICD-10-CM

## 2024-05-06 DIAGNOSIS — Z79.4 TYPE 2 DIABETES MELLITUS WITH HYPERGLYCEMIA, WITH LONG-TERM CURRENT USE OF INSULIN: ICD-10-CM

## 2024-05-06 LAB — HBA1C MFR BLD: 6.3 % (ref 4.8–5.6)

## 2024-05-06 PROCEDURE — G0103 PSA SCREENING: HCPCS

## 2024-05-06 PROCEDURE — 80061 LIPID PANEL: CPT

## 2024-05-06 PROCEDURE — 36415 COLL VENOUS BLD VENIPUNCTURE: CPT

## 2024-05-06 PROCEDURE — 80053 COMPREHEN METABOLIC PANEL: CPT

## 2024-05-06 PROCEDURE — 83036 HEMOGLOBIN GLYCOSYLATED A1C: CPT

## 2024-05-07 LAB
ALBUMIN SERPL-MCNC: 4.7 G/DL (ref 3.5–5.2)
ALBUMIN/GLOB SERPL: 1.8 G/DL
ALP SERPL-CCNC: 77 U/L (ref 39–117)
ALT SERPL W P-5'-P-CCNC: 21 U/L (ref 1–41)
ANION GAP SERPL CALCULATED.3IONS-SCNC: 13 MMOL/L (ref 5–15)
AST SERPL-CCNC: 14 U/L (ref 1–40)
BILIRUB SERPL-MCNC: 0.6 MG/DL (ref 0–1.2)
BUN SERPL-MCNC: 21 MG/DL (ref 8–23)
BUN/CREAT SERPL: 21.6 (ref 7–25)
CALCIUM SPEC-SCNC: 9.4 MG/DL (ref 8.6–10.5)
CHLORIDE SERPL-SCNC: 101 MMOL/L (ref 98–107)
CHOLEST SERPL-MCNC: 143 MG/DL (ref 0–200)
CO2 SERPL-SCNC: 25 MMOL/L (ref 22–29)
CREAT SERPL-MCNC: 0.97 MG/DL (ref 0.76–1.27)
EGFRCR SERPLBLD CKD-EPI 2021: 83.5 ML/MIN/1.73
GLOBULIN UR ELPH-MCNC: 2.6 GM/DL
GLUCOSE SERPL-MCNC: 151 MG/DL (ref 65–99)
HDLC SERPL-MCNC: 34 MG/DL (ref 40–60)
LDLC SERPL CALC-MCNC: 78 MG/DL (ref 0–100)
LDLC/HDLC SERPL: 2.13 {RATIO}
POTASSIUM SERPL-SCNC: 4.7 MMOL/L (ref 3.5–5.2)
PROT SERPL-MCNC: 7.3 G/DL (ref 6–8.5)
PSA SERPL-MCNC: 0.82 NG/ML (ref 0–4)
SODIUM SERPL-SCNC: 139 MMOL/L (ref 136–145)
TRIGL SERPL-MCNC: 183 MG/DL (ref 0–150)
VLDLC SERPL-MCNC: 31 MG/DL (ref 5–40)

## 2024-05-09 ENCOUNTER — OFFICE VISIT (OUTPATIENT)
Dept: INTERNAL MEDICINE | Age: 74
End: 2024-05-09
Payer: MEDICARE

## 2024-05-09 VITALS
OXYGEN SATURATION: 99 % | DIASTOLIC BLOOD PRESSURE: 82 MMHG | WEIGHT: 190 LBS | BODY MASS INDEX: 30.53 KG/M2 | SYSTOLIC BLOOD PRESSURE: 142 MMHG | HEIGHT: 66 IN | HEART RATE: 76 BPM | TEMPERATURE: 97.8 F

## 2024-05-09 DIAGNOSIS — R53.83 OTHER FATIGUE: ICD-10-CM

## 2024-05-09 DIAGNOSIS — G63 POLYNEUROPATHY ASSOCIATED WITH UNDERLYING DISEASE: ICD-10-CM

## 2024-05-09 DIAGNOSIS — I10 PRIMARY HYPERTENSION: ICD-10-CM

## 2024-05-09 DIAGNOSIS — E11.65 TYPE 2 DIABETES MELLITUS WITH HYPERGLYCEMIA, WITH LONG-TERM CURRENT USE OF INSULIN: Primary | ICD-10-CM

## 2024-05-09 DIAGNOSIS — D50.9 IRON DEFICIENCY ANEMIA, UNSPECIFIED IRON DEFICIENCY ANEMIA TYPE: ICD-10-CM

## 2024-05-09 DIAGNOSIS — E78.2 MIXED HYPERLIPIDEMIA: ICD-10-CM

## 2024-05-09 DIAGNOSIS — Z79.4 TYPE 2 DIABETES MELLITUS WITH HYPERGLYCEMIA, WITH LONG-TERM CURRENT USE OF INSULIN: Primary | ICD-10-CM

## 2024-05-09 PROBLEM — Z09 HOSPITAL DISCHARGE FOLLOW-UP: Status: RESOLVED | Noted: 2024-01-15 | Resolved: 2024-05-09

## 2024-05-09 PROCEDURE — 1126F AMNT PAIN NOTED NONE PRSNT: CPT | Performed by: INTERNAL MEDICINE

## 2024-05-09 PROCEDURE — 3077F SYST BP >= 140 MM HG: CPT | Performed by: INTERNAL MEDICINE

## 2024-05-09 PROCEDURE — 99214 OFFICE O/P EST MOD 30 MIN: CPT | Performed by: INTERNAL MEDICINE

## 2024-05-09 PROCEDURE — 1159F MED LIST DOCD IN RCRD: CPT | Performed by: INTERNAL MEDICINE

## 2024-05-09 PROCEDURE — 1160F RVW MEDS BY RX/DR IN RCRD: CPT | Performed by: INTERNAL MEDICINE

## 2024-05-09 PROCEDURE — G2211 COMPLEX E/M VISIT ADD ON: HCPCS | Performed by: INTERNAL MEDICINE

## 2024-05-09 PROCEDURE — 3079F DIAST BP 80-89 MM HG: CPT | Performed by: INTERNAL MEDICINE

## 2024-05-09 PROCEDURE — 3044F HG A1C LEVEL LT 7.0%: CPT | Performed by: INTERNAL MEDICINE

## 2024-05-09 PROCEDURE — 1170F FXNL STATUS ASSESSED: CPT | Performed by: INTERNAL MEDICINE

## 2024-05-09 RX ORDER — BISACODYL 10 MG
10 SUPPOSITORY, RECTAL RECTAL DAILY PRN
Qty: 15 EACH | Refills: 1 | Status: SHIPPED | OUTPATIENT
Start: 2024-05-09

## 2024-05-09 RX ORDER — ASPIRIN 81 MG/1
81 TABLET ORAL DAILY
Qty: 90 TABLET | Refills: 3 | Status: SHIPPED | OUTPATIENT
Start: 2024-05-09

## 2024-05-09 RX ORDER — DOCUSATE SODIUM 100 MG/1
100 CAPSULE, LIQUID FILLED ORAL 3 TIMES DAILY
Qty: 270 CAPSULE | Refills: 3 | Status: SHIPPED | OUTPATIENT
Start: 2024-05-09

## 2024-05-09 RX ORDER — ATORVASTATIN CALCIUM 40 MG/1
40 TABLET, FILM COATED ORAL NIGHTLY
Qty: 90 TABLET | Refills: 3 | Status: SHIPPED | OUTPATIENT
Start: 2024-05-09

## 2024-05-09 RX ORDER — PANTOPRAZOLE SODIUM 40 MG/1
40 TABLET, DELAYED RELEASE ORAL DAILY
Qty: 90 TABLET | Refills: 3 | Status: SHIPPED | OUTPATIENT
Start: 2024-05-09

## 2024-05-09 RX ORDER — ATENOLOL 50 MG/1
50 TABLET ORAL NIGHTLY
Qty: 90 TABLET | Refills: 3 | Status: SHIPPED | OUTPATIENT
Start: 2024-05-09

## 2024-05-09 RX ORDER — LISINOPRIL AND HYDROCHLOROTHIAZIDE 12.5; 1 MG/1; MG/1
1 TABLET ORAL NIGHTLY
Qty: 90 TABLET | Refills: 3 | Status: SHIPPED | OUTPATIENT
Start: 2024-05-09

## 2024-05-09 RX ORDER — DULAGLUTIDE 3 MG/.5ML
3 INJECTION, SOLUTION SUBCUTANEOUS WEEKLY
Qty: 6 ML | Refills: 1 | Status: SHIPPED | OUTPATIENT
Start: 2024-05-09

## 2024-05-09 RX ORDER — DULOXETIN HYDROCHLORIDE 30 MG/1
30 CAPSULE, DELAYED RELEASE ORAL DAILY
Qty: 90 CAPSULE | Refills: 1 | Status: SHIPPED | OUTPATIENT
Start: 2024-05-09

## 2024-05-09 RX ORDER — LORATADINE 10 MG/1
10 TABLET ORAL DAILY
Qty: 90 TABLET | Refills: 3 | Status: SHIPPED | OUTPATIENT
Start: 2024-05-09

## 2024-05-09 RX ORDER — PREGABALIN 200 MG/1
200 CAPSULE ORAL 2 TIMES DAILY
Qty: 180 CAPSULE | Refills: 1 | Status: SHIPPED | OUTPATIENT
Start: 2024-05-09

## 2024-05-23 ENCOUNTER — OFFICE VISIT (OUTPATIENT)
Dept: PODIATRY | Facility: CLINIC | Age: 74
End: 2024-05-23
Payer: MEDICARE

## 2024-05-23 VITALS
SYSTOLIC BLOOD PRESSURE: 142 MMHG | DIASTOLIC BLOOD PRESSURE: 67 MMHG | HEART RATE: 80 BPM | BODY MASS INDEX: 30.68 KG/M2 | TEMPERATURE: 98.5 F | OXYGEN SATURATION: 95 % | WEIGHT: 190 LBS

## 2024-05-23 DIAGNOSIS — L60.0 ONYCHOCRYPTOSIS: ICD-10-CM

## 2024-05-23 DIAGNOSIS — G62.9 NEUROPATHY: Primary | ICD-10-CM

## 2024-05-23 DIAGNOSIS — E11.8 DIABETIC FOOT: ICD-10-CM

## 2024-05-23 DIAGNOSIS — E11.42 TYPE 2 DIABETES MELLITUS WITH DIABETIC POLYNEUROPATHY, WITH LONG-TERM CURRENT USE OF INSULIN: ICD-10-CM

## 2024-05-23 DIAGNOSIS — M79.672 FOOT PAIN, BILATERAL: ICD-10-CM

## 2024-05-23 DIAGNOSIS — Z79.4 TYPE 2 DIABETES MELLITUS WITH DIABETIC POLYNEUROPATHY, WITH LONG-TERM CURRENT USE OF INSULIN: ICD-10-CM

## 2024-05-23 DIAGNOSIS — M79.671 FOOT PAIN, BILATERAL: ICD-10-CM

## 2024-05-23 DIAGNOSIS — B35.1 ONYCHOMYCOSIS: ICD-10-CM

## 2024-05-23 RX ORDER — TERBINAFINE HYDROCHLORIDE 250 MG/1
250 TABLET ORAL DAILY
Qty: 90 TABLET | Refills: 0 | Status: SHIPPED | OUTPATIENT
Start: 2024-05-23 | End: 2024-08-21

## 2024-05-23 NOTE — PROGRESS NOTES
[facility] - PODIATRY    Today's Date: 05/23/24    Patient Name: Norberto Whiteside  MRN: [id number]  CSN: [id number]  PCP: Moreno Pereira MD, Last PCP Visit: 5/9/2024  Referring Provider: No ref. provider found    SUBJECTIVE     Chief Complaint   Patient presents with    Left Foot - Follow-up, Nail Problem    Right Foot - Follow-up, Nail Problem     HPI: Norberto Whiteside, a 74 y.o.male, comes to clinic.    New, Established, New Problem:  est    Location:  Toenails    Duration:   Greater than five years    Onset:  Gradual    Nature:  sore with palpation.    Stable, worsening, improving:   Stable    Aggravating factors:  Pain with shoe gear and ambulation.    Previous Treatment:  debridement    Patient states their most recent blood glucose reading was  142.    Medical changes:  none.    Patient denies any fevers, chills, nausea, vomiting, shortness of breathe, nor any other constitutional signs nor symptoms.       I have reviewed/confirmed previously documented HPI with no changes.       Past Medical History:   Diagnosis Date    Acid reflux     Allergies     seasonal    Anxiety     Callus     Cancer     skin cancer    Diabetes mellitus     ave -140    Difficulty walking 2014    Balancing difficulty    Hard of hearing     Hyperlipidemia     Hypertension     MI (myocardial infarction) 1996    follows with PCP    Neck pain     Neuropathy     legs, feet, arms, hands, shoulders    Sciatica     Skin cancer     Sleep apnea     Vision loss 1962     Past Surgical History:   Procedure Laterality Date    ANGIOPLASTY  1996    no stents    ANTERIOR CERVICAL DISCECTOMY W/ FUSION Right 1/10/2024    Procedure: ANTERIOR CERVICAL DISCECTOMY AND FUSION USING ALLOGRAFT BONE AND INSTRUMENTATION, right approach, 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);  Surgeon: Konstantin Michael MD;  Location: [facility] MAIN OR;  Service: Neurosurgery;  Laterality: Right;    COLONOSCOPY N/A 01/12/2022     Procedure: COLONOSCOPY with polypectomies;  Surgeon: Kanu Meade MD;  Location: [facility] ENDOSCOPY;  Service: General;  Laterality: N/A;  colon polyps     Family History   Problem Relation Age of Onset    Stroke Mother         Stroke around     Cancer Mother     Cancer Father     Cancer Sister     Neuropathy Brother     Ariel Hyperthermia Neg Hx      Social History     Socioeconomic History    Marital status:    Tobacco Use    Smoking status: Former     Current packs/day: 0.00     Average packs/day: 1 pack/day for 24.0 years (24.0 ttl pk-yrs)     Types: Cigarettes     Start date: 1972     Quit date: 3/1/1996     Years since quittin.2     Passive exposure: Past    Smokeless tobacco: Never   Vaping Use    Vaping status: Never Used   Substance and Sexual Activity    Alcohol use: Yes     Alcohol/week: 2.0 standard drinks of alcohol     Types: 2 Glasses of wine per week     Comment: rare    Drug use: Not Currently     Frequency: 3.0 times per week     Types: Marijuana     Comment: Helps relieve neuropathy pain in feet and arthritis pain in    Sexual activity: Not Currently     Partners: Female     Birth control/protection: None     Comment: ED     No Known Allergies  Current Outpatient Medications   Medication Sig Dispense Refill    aspirin 81 MG EC tablet Take 1 tablet by mouth Daily. 90 tablet 3    atenolol (TENORMIN) 50 MG tablet Take 1 tablet by mouth Every Night. 90 tablet 3    atorvastatin (LIPITOR) 40 MG tablet Take 1 tablet by mouth Every Night. 90 tablet 3    bisacodyl (DULCOLAX) 10 MG suppository Insert 1 suppository into the rectum Daily As Needed for Constipation. 15 each 1    docusate sodium (COLACE) 100 MG capsule Take 1 capsule by mouth 3 (Three) Times a Day. 270 capsule 3    Dulaglutide (Trulicity) 3 MG/0.5ML solution pen-injector Inject 0.5 mL under the skin into the appropriate area as directed 1 (One) Time Per Week. On Sundays 6 mL 1    DULoxetine (CYMBALTA) 30 MG capsule Take 1  capsule by mouth Daily. 90 capsule 1    empagliflozin (Jardiance) 25 MG tablet tablet Take 1 tablet by mouth Daily. 90 tablet 3    fluorouracil (EFUDEX) 5 % cream Apply 1 Application topically to the appropriate area as directed Daily As Needed.      Insulin Glargine (LANTUS SOLOSTAR) 100 UNIT/ML injection pen Inject 15 Units under the skin into the appropriate area as directed Daily for 200 days. 3 mL 3    ipratropium (ATROVENT) 0.06 % nasal spray 2 sprays into the nostril(s) as directed by provider 4 (Four) Times a Day. 15 mL 3    lisinopril-hydrochlorothiazide (PRINZIDE,ZESTORETIC) 10-12.5 MG per tablet Take 1 tablet by mouth Every Night. 90 tablet 3    loratadine (CLARITIN) 10 MG tablet Take 1 tablet by mouth Daily. 90 tablet 3    metFORMIN (GLUCOPHAGE) 1000 MG tablet Take 1 tablet by mouth 2 (Two) Times a Day With Meals. 180 tablet 3    multivitamin (MULTIVITAMIN PO) Take 1 tablet by mouth Daily.      multivitamin with minerals tablet tablet Take 1 tablet by mouth Daily. 90 tablet 1    pantoprazole (PROTONIX) 40 MG EC tablet Take 1 tablet by mouth Daily. 90 tablet 3    pregabalin (Lyrica) 200 MG capsule Take 1 capsule by mouth 2 (Two) Times a Day. 180 capsule 1    triamcinolone (KENALOG) 0.1 % cream Apply 1 application topically to the appropriate area as directed 2 (Two) Times a Day. (Patient taking differently: Apply 1 Application topically to the appropriate area as directed 2 (Two) Times a Day As Needed for Irritation or Rash.) 30 g 1    terbinafine (LamISIL) 250 MG tablet Take 1 tablet by mouth Daily for 90 days. 90 tablet 0     No current facility-administered medications for this visit.     Review of Systems   Constitutional: Negative.    Skin:         Painful toenails   Neurological:  Positive for numbness.   All other systems reviewed and are negative.      OBJECTIVE     Vitals:    05/23/24 1526   BP: 142/67   Pulse: 80   Temp: 98.5 °F (36.9 °C)   SpO2: 95%             Lab Results   Component Value  Date    HGBA1C 6.30 (H) 05/06/2024       Lab Results   Component Value Date    GLUCOSE 151 (H) 05/06/2024    CALCIUM 9.4 05/06/2024     05/06/2024    K 4.7 05/06/2024    CO2 25.0 05/06/2024     05/06/2024    BUN 21 05/06/2024    CREATININE 0.97 05/06/2024    EGFRIFNONA 69 01/14/2022    BCR 21.6 05/06/2024    ANIONGAP 13.0 05/06/2024       Patient seen in no apparent distress.      PHYSICAL EXAM:     Foot/Ankle Exam    GENERAL  Appearance:  elderly  Orientation:  AAOx3  Affect:  appropriate  Gait:  antalgic  Assistance:  cane use  Right shoe gear: casual shoe  Left shoe gear: casual shoe    VASCULAR     Right Foot Vascularity   Dorsalis pedis:  1+  Posterior tibial:  1+  Skin temperature:  cool  Edema grading:  None  CFT:  < 3 seconds  Pedal hair growth:  Present  Varicosities:  mild varicosities     Left Foot Vascularity   Dorsalis pedis:  1+  Posterior tibial:  1+  Skin temperature:  cool  Edema grading:  None  CFT:  < 3 seconds  Pedal hair growth:  Present  Varicosities:  mild varicosities     NEUROLOGIC     Right Foot Neurologic   Light touch sensation: diminished  Vibratory sensation: diminished  Hot/Cold sensation: diminished  Protective Sensation using Montgomery-Keiko Monofilament:   Sites intact: 3  Sites tested: 10     Left Foot Neurologic   Light touch sensation: diminished  Vibratory sensation: diminished  Hot/Cold sensation:  diminished  Protective Sensation using Montgomery-Keiko Monofilament:   Sites intact: 3  Sites tested: 10    MUSCLE STRENGTH     Right Foot Muscle Strength   Foot dorsiflexion:  4-  Foot plantar flexion:  4-  Foot inversion:  4-  Foot eversion:  4-     Left Foot Muscle Strength   Foot dorsiflexion:  4-  Foot plantar flexion:  4-  Foot inversion:  4-  Foot eversion:  4-    RANGE OF MOTION     Right Foot Range of Motion   Foot and ankle ROM within normal limits       Left Foot Range of Motion   Foot and ankle ROM within normal limits      DERMATOLOGIC      Right Foot  Dermatologic   Skin  Right foot skin is intact.   Nails  1.  Positive for elongated, onychomycosis, abnormal thickness, subungual debris and ingrown toenail.  2.  Positive for elongated, onychomycosis, abnormal thickness, subungual debris and ingrown toenail.  3.  Positive for elongated, onychomycosis, abnormal thickness, subungual debris and ingrown toenail.  4.  Positive for elongated, onychomycosis, abnormal thickness, subungual debris and ingrown toenail.  5.  Positive for elongated, onychomycosis, abnormal thickness, subungual debris and ingrown toenail.  Nails comment:  Toenails 1, 2, 3, 4, and 5     Left Foot Dermatologic   Skin  Left foot skin is intact.   Nails comment:  Toenails 1, 2, 3, 4, and 5  Nails  1.  Positive for elongated, onychomycosis, abnormal thickness, subungual debris and ingrown toenail.  2.  Positive for elongated, onychomycosis, abnormal thickness, subungual debris and ingrown toenail.  3.  Positive for elongated, onychomycosis, abnormal thickness, subungual debris and ingrown toenail.  4.  Positive for elongated, onychomycosis, abnormally thick, subungual debris and ingrown toenail.  5.  Positive for elongated, onychomycosis, abnormally thick, subungual debris and ingrown toenail.    Lab Results   Component Value Date    GLUCOSE 151 (H) 05/06/2024    BUN 21 05/06/2024    CREATININE 0.97 05/06/2024    EGFR 83.5 05/06/2024    BCR 21.6 05/06/2024    K 4.7 05/06/2024    CO2 25.0 05/06/2024    CALCIUM 9.4 05/06/2024    ALBUMIN 4.7 05/06/2024    BILITOT 0.6 05/06/2024    AST 14 05/06/2024    ALT 21 05/06/2024       ASSESSMENT/PLAN     Diagnoses and all orders for this visit:    1. Neuropathy (Primary)    2. Onychomycosis  -     terbinafine (LamISIL) 250 MG tablet; Take 1 tablet by mouth Daily for 90 days.  Dispense: 90 tablet; Refill: 0    3. Onychocryptosis    4. Diabetic foot    5. Type 2 diabetes mellitus with diabetic polyneuropathy, with long-term current use of insulin    6. Foot pain,  bilateral    Comprehensive lower extremity examination and evaluation was performed.    Discussed findings and treatment plan including risks, benefits, and treatment options with patient in detail. Patient agreed with treatment plan.    Toenails 1 through 5 bilaterally were debrided in thickness and length and then smoothed with a Dremel Tool.  Tolerated the procedure well without complications.    An After Visit Summary was printed and given to the patient at discharge, including (if requested) any available informative/educational handouts regarding diagnosis, treatment, or medications. All questions were answered to patient/family satisfaction. Should symptoms fail to improve or worsen they agree to call or return to clinic or to go to the Emergency Department. Discussed the importance of following up with any needed screening tests/labs/specialist appointments and any requested follow-up recommended by me today. Importance of maintaining follow-up discussed and patient accepts that missed appointments can delay diagnosis and potentially lead to worsening of conditions.    Return in about 9 weeks (around 7/25/2024) for Toenail Care., or sooner if acute issues arise.    I have reviewed the assessment and plan and verified the accuracy of it. No changes to assessment and plan since the information was documented. Wayne Foster DPM 05/23/24     I have dictated this note utilizing Dragon Dictation.  Please note that portions of this note were completed with a voice recognition program.  Part of this note may be an electronic transcription/translation of spoken language to printed text using the Dragon Dictation System.      This document has been electronically signed by Wayne Foster DPM on May 23, 2024 15:39 EDT

## 2024-06-27 ENCOUNTER — HOSPITAL ENCOUNTER (OUTPATIENT)
Dept: GENERAL RADIOLOGY | Facility: HOSPITAL | Age: 74
Discharge: HOME | End: 2024-06-27
Admitting: PHYSICIAN ASSISTANT
Payer: MEDICARE

## 2024-06-27 DIAGNOSIS — M50.221 HERNIATED NUCLEUS PULPOSUS, C4-5: ICD-10-CM

## 2024-06-27 DIAGNOSIS — Z98.1 STATUS POST CERVICAL SPINAL FUSION: ICD-10-CM

## 2024-06-27 PROCEDURE — 72050 X-RAY EXAM NECK SPINE 4/5VWS: CPT

## 2024-07-03 ENCOUNTER — OFFICE VISIT (OUTPATIENT)
Dept: NEUROSURGERY | Facility: CLINIC | Age: 74
End: 2024-07-03
Payer: MEDICARE

## 2024-07-03 VITALS
WEIGHT: 199 LBS | BODY MASS INDEX: 31.98 KG/M2 | HEIGHT: 66 IN | SYSTOLIC BLOOD PRESSURE: 130 MMHG | HEART RATE: 82 BPM | DIASTOLIC BLOOD PRESSURE: 74 MMHG

## 2024-07-03 DIAGNOSIS — M50.221 HERNIATED NUCLEUS PULPOSUS, C4-5: Primary | ICD-10-CM

## 2024-07-03 DIAGNOSIS — Z98.1 STATUS POST CERVICAL SPINAL FUSION: ICD-10-CM

## 2024-07-03 NOTE — PROGRESS NOTES
Patient being seen for today for Follow-up  .    Subjective    Norberto Whiteside is a 74 y.o. male that presents with Follow-up  .    HPI  Previously: Last seen on 4/3/2024 status post ACDF using a right approach at C4-C5 on 1/10/2024.  The ACDF was stable on x-ray.  There was plan to resume normal activity as tolerated.  There is plan to monitor for new or worsening complaints and notify us of change.  There was plan to follow-up in 3 months to reassess with x-ray of the cervical spine to monitor the ACDF from C4-C5.    Today: He reports some numbness in the right shoulder down to the tip of the 5th digit, worse at nighttime. He denies neck or arm pain, however.     reports that he quit smoking about 28 years ago. His smoking use included cigarettes. He started smoking about 51 years ago. He has a 24 pack-year smoking history. He has been exposed to tobacco smoke. He has never used smokeless tobacco.    Review of Systems   Musculoskeletal:  Negative for neck pain.   Neurological:  Positive for numbness.       Objective   Vitals:    07/03/24 1046   BP: 130/74   Pulse: 82        Physical Exam  Constitutional:       Appearance: Normal appearance. He is obese.   Pulmonary:      Effort: Pulmonary effort is normal.   Neurological:      General: No focal deficit present.      Mental Status: He is alert and oriented to person, place, and time.      Sensory: No sensory deficit.      Motor: No weakness.      Deep Tendon Reflexes: Reflexes normal.   Psychiatric:         Mood and Affect: Mood normal.         Behavior: Behavior normal.          Result Review   I have personally interpreted the x-ray of cervical spine from 6/27/2024 which shows stable ACDF at C4-C5.     Assessment and Plan {CC Problem List  Visit Diagnosis  ROS  Review (Popup)  [employer] Maintenance  Quality  BestPractice  Medications  SmartSets  SnapShot Encounters  Media :23}   Diagnoses and all orders for this visit:    1. Herniated nucleus pulposus,  C4-5 (Primary)  -     XR Spine Cervical Complete 4 or 5 View; Future    2. Status post cervical spinal fusion  -     XR Spine Cervical Complete 4 or 5 View; Future    The polyneuropathy in the right upper extremities may explain the intermittent right arm numbness.    The ACDF is stable on x-ray.    He will monitor for new or worsening complaints and notify us of change.    He will follow-up in 3 months to reassess and monitor the ACDF at C4-C5 with an x-ray of the cervical spine 2-3 days in advance.    Follow Up {Instructions Charge Capture  Follow-up Communications :23}   Return in about 3 months (around 10/3/2024).

## 2024-07-12 ENCOUNTER — TELEPHONE (OUTPATIENT)
Dept: INTERNAL MEDICINE | Age: 74
End: 2024-07-12

## 2024-07-12 RX ORDER — DULOXETIN HYDROCHLORIDE 60 MG/1
60 CAPSULE, DELAYED RELEASE ORAL DAILY
Qty: 90 CAPSULE | Refills: 1 | Status: SHIPPED | OUTPATIENT
Start: 2024-07-12

## 2024-07-12 NOTE — TELEPHONE ENCOUNTER
Caller: [email or web address]    Relationship: Emergency Contact    Best call back number: [phone]     Requested Prescriptions:   [facility]    Pharmacy where request should be sent: [facility] [place] [phone] [facility] [phone]      Last office visit with prescribing clinician: 5/9/2024   Last telemedicine visit with prescribing clinician: Visit date not found   Next office visit with prescribing clinician: 9/9/2024     Additional details provided by patient: PATIENT WOULD LIKE A HIGHER DOSE OF THIS MEDICATION    Does the patient have less than a 3 day supply:  [x] Yes  [] No    Would you like a call back once the refill request has been completed: [x] Yes [] No    If the office needs to give you a call back, can they leave a voicemail: [x] Yes [] No    Roni Mackenzie Rep   07/12/24 10:53 EDT

## 2024-07-12 NOTE — TELEPHONE ENCOUNTER
I sent over the 60 mg dose.  Tell him he can take 2 of his 30 mg doses at 1 time until they are gone, and then started on one of the 60 mg dose.  Thanks.

## 2024-08-15 ENCOUNTER — OFFICE VISIT (OUTPATIENT)
Dept: PODIATRY | Facility: CLINIC | Age: 74
End: 2024-08-15
Payer: MEDICARE

## 2024-08-15 VITALS
SYSTOLIC BLOOD PRESSURE: 138 MMHG | TEMPERATURE: 98 F | OXYGEN SATURATION: 94 % | RESPIRATION RATE: 16 BRPM | WEIGHT: 197 LBS | HEART RATE: 82 BPM | BODY MASS INDEX: 31.66 KG/M2 | HEIGHT: 66 IN | DIASTOLIC BLOOD PRESSURE: 72 MMHG

## 2024-08-15 DIAGNOSIS — E11.42 TYPE 2 DIABETES MELLITUS WITH DIABETIC POLYNEUROPATHY, WITH LONG-TERM CURRENT USE OF INSULIN: ICD-10-CM

## 2024-08-15 DIAGNOSIS — Z79.4 TYPE 2 DIABETES MELLITUS WITH DIABETIC POLYNEUROPATHY, WITH LONG-TERM CURRENT USE OF INSULIN: ICD-10-CM

## 2024-08-15 DIAGNOSIS — M79.672 FOOT PAIN, BILATERAL: ICD-10-CM

## 2024-08-15 DIAGNOSIS — M79.671 FOOT PAIN, BILATERAL: ICD-10-CM

## 2024-08-15 DIAGNOSIS — L60.0 ONYCHOCRYPTOSIS: ICD-10-CM

## 2024-08-15 DIAGNOSIS — B35.1 ONYCHOMYCOSIS: ICD-10-CM

## 2024-08-15 DIAGNOSIS — G62.9 NEUROPATHY: ICD-10-CM

## 2024-08-15 DIAGNOSIS — E11.8 DIABETIC FOOT: Primary | ICD-10-CM

## 2024-08-15 NOTE — PROGRESS NOTES
[facility] - PODIATRY    Today's Date: 08/15/24    Patient Name: Norberto Whiteside  MRN: [id number]  CSN: [id number]  PCP: Moreno Pereira MD, Last PCP Visit: 5/9/2024  Referring Provider: No ref. provider found    SUBJECTIVE     Chief Complaint   Patient presents with    Right Foot - Follow-up, Nail Problem    Left Foot - Follow-up, Nail Problem     HPI: Norberto Whiteside, a 74 y.o.male, comes to clinic.    New, Established, New Problem:  est    Location:  Toenails    Duration:   Greater than five years    Onset:  Gradual    Nature:  sore with palpation.    Stable, worsening, improving:   Stable    Aggravating factors:  Pain with shoe gear and ambulation.    Previous Treatment:  debridement    Patient states their most recent blood glucose reading was  135    Medical changes: Evaluated neurosurgery    Patient denies any fevers, chills, nausea, vomiting, shortness of breathe, nor any other constitutional signs nor symptoms.       I have reviewed/confirmed previously documented HPI with no changes.       Past Medical History:   Diagnosis Date    Acid reflux     Allergies     seasonal    Anxiety     Callus     Cancer     skin cancer    Diabetes mellitus     ave -140    Difficulty walking 2014    Balancing difficulty    Hard of hearing     Hyperlipidemia     Hypertension     MI (myocardial infarction) 1996    follows with PCP    Neck pain     Neuropathy     legs, feet, arms, hands, shoulders    Sciatica     Skin cancer     Sleep apnea     Vision loss 1962     Past Surgical History:   Procedure Laterality Date    ANGIOPLASTY  1996    no stents    ANTERIOR CERVICAL DISCECTOMY W/ FUSION Right 1/10/2024    Procedure: ANTERIOR CERVICAL DISCECTOMY AND FUSION USING ALLOGRAFT BONE AND INSTRUMENTATION, right approach, 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 MAIN OR;  Service: Neurosurgery;  Laterality: Right;    COLONOSCOPY N/A  2022    Procedure: COLONOSCOPY with polypectomies;  Surgeon: Kanu Meade MD;  Location: [facility] ENDOSCOPY;  Service: General;  Laterality: N/A;  colon polyps     Family History   Problem Relation Age of Onset    Stroke Mother         Stroke around     Cancer Mother     Cancer Father     Cancer Sister     Neuropathy Brother     Malbrenda Hyperthermia Neg Hx      Social History     Socioeconomic History    Marital status:    Tobacco Use    Smoking status: Former     Current packs/day: 0.00     Average packs/day: 1 pack/day for 24.0 years (24.0 ttl pk-yrs)     Types: Cigarettes     Start date: 1972     Quit date: 3/1/1996     Years since quittin.4     Passive exposure: Past    Smokeless tobacco: Never   Vaping Use    Vaping status: Never Used   Substance and Sexual Activity    Alcohol use: Yes     Alcohol/week: 2.0 standard drinks of alcohol     Types: 2 Glasses of wine per week     Comment: rare    Drug use: Not Currently     Frequency: 3.0 times per week     Types: Marijuana     Comment: Helps relieve neuropathy pain in feet and arthritis pain in    Sexual activity: Not Currently     Partners: Female     Birth control/protection: None     Comment: ED     No Known Allergies  Current Outpatient Medications   Medication Sig Dispense Refill    aspirin 81 MG EC tablet Take 1 tablet by mouth Daily. 90 tablet 3    atenolol (TENORMIN) 50 MG tablet Take 1 tablet by mouth Every Night. 90 tablet 3    atorvastatin (LIPITOR) 40 MG tablet Take 1 tablet by mouth Every Night. 90 tablet 3    bisacodyl (DULCOLAX) 10 MG suppository Insert 1 suppository into the rectum Daily As Needed for Constipation. 15 each 1    docusate sodium (COLACE) 100 MG capsule Take 1 capsule by mouth 3 (Three) Times a Day. 270 capsule 3    Dulaglutide (Trulicity) 3 MG/0.5ML solution pen-injector Inject 0.5 mL under the skin into the appropriate area as directed 1 (One) Time Per Week. On Sundays 6 mL 1    DULoxetine (CYMBALTA) 60 MG  capsule Take 1 capsule by mouth Daily. 90 capsule 1    empagliflozin (Jardiance) 25 MG tablet tablet Take 1 tablet by mouth Daily. 90 tablet 3    fluorouracil (EFUDEX) 5 % cream Apply 1 Application topically to the appropriate area as directed Daily As Needed.      Insulin Glargine (LANTUS SOLOSTAR) 100 UNIT/ML injection pen Inject 15 Units under the skin into the appropriate area as directed Daily for 200 days. 3 mL 3    ipratropium (ATROVENT) 0.06 % nasal spray 2 sprays into the nostril(s) as directed by provider 4 (Four) Times a Day. 15 mL 3    lisinopril-hydrochlorothiazide (PRINZIDE,ZESTORETIC) 10-12.5 MG per tablet Take 1 tablet by mouth Every Night. 90 tablet 3    loratadine (CLARITIN) 10 MG tablet Take 1 tablet by mouth Daily. 90 tablet 3    metFORMIN (GLUCOPHAGE) 1000 MG tablet Take 1 tablet by mouth 2 (Two) Times a Day With Meals. 180 tablet 3    multivitamin (MULTIVITAMIN PO) Take 1 tablet by mouth Daily.      multivitamin with minerals tablet tablet Take 1 tablet by mouth Daily. 90 tablet 1    pantoprazole (PROTONIX) 40 MG EC tablet Take 1 tablet by mouth Daily. 90 tablet 3    pregabalin (Lyrica) 200 MG capsule Take 1 capsule by mouth 2 (Two) Times a Day. 180 capsule 1    terbinafine (LamISIL) 250 MG tablet Take 1 tablet by mouth Daily for 90 days. 90 tablet 0    triamcinolone (KENALOG) 0.1 % cream Apply 1 application topically to the appropriate area as directed 2 (Two) Times a Day. (Patient taking differently: Apply 1 Application topically to the appropriate area as directed 2 (Two) Times a Day As Needed for Irritation or Rash.) 30 g 1    Ibuprofen 3 %, Gabapentin 10 %, Baclofen 2 %, lidocaine 4 %, Ketamine HCl 4 % Apply 1-2 g topically to the appropriate area as directed 3 (Three) to 4 (Four) times daily. 90 g 5     No current facility-administered medications for this visit.     Review of Systems   Constitutional: Negative.    Skin:         Painful toenails   Neurological:  Positive for numbness.    All other systems reviewed and are negative.      OBJECTIVE     Vitals:    08/15/24 1433   BP: 138/72   Pulse: 82   Resp: 16   Temp: 98 °F (36.7 °C)   SpO2: 94%             Lab Results   Component Value Date    HGBA1C 6.30 (H) 05/06/2024       Lab Results   Component Value Date    GLUCOSE 151 (H) 05/06/2024    CALCIUM 9.4 05/06/2024     05/06/2024    K 4.7 05/06/2024    CO2 25.0 05/06/2024     05/06/2024    BUN 21 05/06/2024    CREATININE 0.97 05/06/2024    EGFRIFNONA 69 01/14/2022    BCR 21.6 05/06/2024    ANIONGAP 13.0 05/06/2024       Patient seen in no apparent distress.      PHYSICAL EXAM:     Foot/Ankle Exam    GENERAL  Appearance:  elderly  Orientation:  AAOx3  Affect:  appropriate  Gait:  antalgic  Assistance:  cane use  Right shoe gear: casual shoe  Left shoe gear: casual shoe    VASCULAR     Right Foot Vascularity   Dorsalis pedis:  1+  Posterior tibial:  1+  Skin temperature:  cool  Edema grading:  None  CFT:  < 3 seconds  Pedal hair growth:  Present  Varicosities:  mild varicosities     Left Foot Vascularity   Dorsalis pedis:  1+  Posterior tibial:  1+  Skin temperature:  cool  Edema grading:  None  CFT:  < 3 seconds  Pedal hair growth:  Present  Varicosities:  mild varicosities     NEUROLOGIC     Right Foot Neurologic   Light touch sensation: diminished  Vibratory sensation: diminished  Hot/Cold sensation: diminished  Protective Sensation using [place] Monofilament:   Sites intact: 3  Sites tested: 10     Left Foot Neurologic   Light touch sensation: diminished  Vibratory sensation: diminished  Hot/Cold sensation:  diminished  Protective Sensation using [place] Monofilament:   Sites intact: 3  Sites tested: 10    MUSCLE STRENGTH     Right Foot Muscle Strength   Foot dorsiflexion:  4-  Foot plantar flexion:  4-  Foot inversion:  4-  Foot eversion:  4-     Left Foot Muscle Strength   Foot dorsiflexion:  4-  Foot plantar flexion:  4-  Foot inversion:  4-  Foot eversion:   4-    RANGE OF MOTION     Right Foot Range of Motion   Foot and ankle ROM within normal limits       Left Foot Range of Motion   Foot and ankle ROM within normal limits      DERMATOLOGIC      Right Foot Dermatologic   Skin  Right foot skin is intact.   Nails  1.  Positive for elongated, onychomycosis, abnormal thickness, subungual debris and ingrown toenail.  2.  Positive for elongated, onychomycosis, abnormal thickness, subungual debris and ingrown toenail.  3.  Positive for elongated, onychomycosis, abnormal thickness, subungual debris and ingrown toenail.  4.  Positive for elongated, onychomycosis, abnormal thickness, subungual debris and ingrown toenail.  5.  Positive for elongated, onychomycosis, abnormal thickness, subungual debris and ingrown toenail.  Nails comment:  Toenails 1, 2, 3, 4, and 5     Left Foot Dermatologic   Skin  Left foot skin is intact.   Nails comment:  Toenails 1, 2, 3, 4, and 5  Nails  1.  Positive for elongated, onychomycosis, abnormal thickness, subungual debris and ingrown toenail.  2.  Positive for elongated, onychomycosis, abnormal thickness, subungual debris and ingrown toenail.  3.  Positive for elongated, onychomycosis, abnormal thickness, subungual debris and ingrown toenail.  4.  Positive for elongated, onychomycosis, abnormally thick, subungual debris and ingrown toenail.  5.  Positive for elongated, onychomycosis, abnormally thick, subungual debris and ingrown toenail.    I have reexamined the patient the results are consistent with the previously documented exam.    ASSESSMENT/PLAN     Diagnoses and all orders for this visit:    1. Diabetic foot (Primary)    2. Onychocryptosis    3. Onychomycosis    4. Neuropathy  -     Ibuprofen 3 %, Gabapentin 10 %, Baclofen 2 %, lidocaine 4 %, Ketamine HCl 4 %; Apply 1-2 g topically to the appropriate area as directed 3 (Three) to 4 (Four) times daily.  Dispense: 90 g; Refill: 5    5. Type 2 diabetes mellitus with diabetic polyneuropathy,  with long-term current use of insulin    6. Foot pain, bilateral    Comprehensive lower extremity examination and evaluation was performed.    Discussed findings and treatment plan including risks, benefits, and treatment options with patient in detail. Patient agreed with treatment plan.    Toenails 1 through 5 bilaterally were debrided in thickness and length and then smoothed with a Dremel Tool.  Tolerated the procedure well without complications.    An After Visit Summary was printed and given to the patient at discharge, including (if requested) any available informative/educational handouts regarding diagnosis, treatment, or medications. All questions were answered to patient/family satisfaction. Should symptoms fail to improve or worsen they agree to call or return to clinic or to go to the Emergency Department. Discussed the importance of following up with any needed screening tests/labs/specialist appointments and any requested follow-up recommended by me today. Importance of maintaining follow-up discussed and patient accepts that missed appointments can delay diagnosis and potentially lead to worsening of conditions.    Return in about 9 weeks (around 10/17/2024) for Toenail Care., or sooner if acute issues arise.    I have reviewed the assessment and plan and verified the accuracy of it. No changes to assessment and plan since the information was documented. Wayne Foster DPM 08/15/24     I have dictated this note utilizing Dragon Dictation.  Please note that portions of this note were completed with a voice recognition program.  Part of this note may be an electronic transcription/translation of spoken language to printed text using the Dragon Dictation System.      This document has been electronically signed by Wayne Foster DPM on August 15, 2024 14:52 EDT

## 2024-09-04 ENCOUNTER — LAB (OUTPATIENT)
Dept: LAB | Facility: HOSPITAL | Age: 74
End: 2024-09-04
Payer: MEDICARE

## 2024-09-04 DIAGNOSIS — D50.9 IRON DEFICIENCY ANEMIA, UNSPECIFIED IRON DEFICIENCY ANEMIA TYPE: ICD-10-CM

## 2024-09-04 DIAGNOSIS — I10 PRIMARY HYPERTENSION: ICD-10-CM

## 2024-09-04 DIAGNOSIS — Z79.4 TYPE 2 DIABETES MELLITUS WITH HYPERGLYCEMIA, WITH LONG-TERM CURRENT USE OF INSULIN: ICD-10-CM

## 2024-09-04 DIAGNOSIS — R53.83 OTHER FATIGUE: ICD-10-CM

## 2024-09-04 DIAGNOSIS — E11.65 TYPE 2 DIABETES MELLITUS WITH HYPERGLYCEMIA, WITH LONG-TERM CURRENT USE OF INSULIN: ICD-10-CM

## 2024-09-04 DIAGNOSIS — E78.2 MIXED HYPERLIPIDEMIA: ICD-10-CM

## 2024-09-04 LAB
ALBUMIN SERPL-MCNC: 4.8 G/DL (ref 3.5–5.2)
ALBUMIN/GLOB SERPL: 1.8 G/DL
ALP SERPL-CCNC: 68 U/L (ref 39–117)
ALT SERPL W P-5'-P-CCNC: 24 U/L (ref 1–41)
ANION GAP SERPL CALCULATED.3IONS-SCNC: 14 MMOL/L (ref 5–15)
AST SERPL-CCNC: 17 U/L (ref 1–40)
BASOPHILS # BLD AUTO: 0.03 10*3/MM3 (ref 0–0.2)
BASOPHILS NFR BLD AUTO: 0.2 % (ref 0–1.5)
BILIRUB SERPL-MCNC: 0.5 MG/DL (ref 0–1.2)
BUN SERPL-MCNC: 19 MG/DL (ref 8–23)
BUN/CREAT SERPL: 18.1 (ref 7–25)
CALCIUM SPEC-SCNC: 10.5 MG/DL (ref 8.6–10.5)
CHLORIDE SERPL-SCNC: 99 MMOL/L (ref 98–107)
CHOLEST SERPL-MCNC: 184 MG/DL (ref 0–200)
CO2 SERPL-SCNC: 28 MMOL/L (ref 22–29)
CREAT SERPL-MCNC: 1.05 MG/DL (ref 0.76–1.27)
DEPRECATED RDW RBC AUTO: 42.6 FL (ref 37–54)
EGFRCR SERPLBLD CKD-EPI 2021: 75.4 ML/MIN/1.73
EOSINOPHIL # BLD AUTO: 0.28 10*3/MM3 (ref 0–0.4)
EOSINOPHIL NFR BLD AUTO: 2.2 % (ref 0.3–6.2)
ERYTHROCYTE [DISTWIDTH] IN BLOOD BY AUTOMATED COUNT: 12.6 % (ref 12.3–15.4)
FOLATE SERPL-MCNC: >20 NG/ML (ref 4.78–24.2)
GLOBULIN UR ELPH-MCNC: 2.7 GM/DL
GLUCOSE SERPL-MCNC: 154 MG/DL (ref 65–99)
HBA1C MFR BLD: 7.7 % (ref 4.8–5.6)
HCT VFR BLD AUTO: 49.3 % (ref 37.5–51)
HDLC SERPL-MCNC: 36 MG/DL (ref 40–60)
HGB BLD-MCNC: 16.6 G/DL (ref 13–17.7)
IMM GRANULOCYTES # BLD AUTO: 0.06 10*3/MM3 (ref 0–0.05)
IMM GRANULOCYTES NFR BLD AUTO: 0.5 % (ref 0–0.5)
LDLC SERPL CALC-MCNC: 96 MG/DL (ref 0–100)
LDLC/HDLC SERPL: 2.39 {RATIO}
LYMPHOCYTES # BLD AUTO: 2.56 10*3/MM3 (ref 0.7–3.1)
LYMPHOCYTES NFR BLD AUTO: 19.8 % (ref 19.6–45.3)
MCH RBC QN AUTO: 31.1 PG (ref 26.6–33)
MCHC RBC AUTO-ENTMCNC: 33.7 G/DL (ref 31.5–35.7)
MCV RBC AUTO: 92.5 FL (ref 79–97)
MONOCYTES # BLD AUTO: 0.88 10*3/MM3 (ref 0.1–0.9)
MONOCYTES NFR BLD AUTO: 6.8 % (ref 5–12)
NEUTROPHILS NFR BLD AUTO: 70.5 % (ref 42.7–76)
NEUTROPHILS NFR BLD AUTO: 9.11 10*3/MM3 (ref 1.7–7)
NRBC BLD AUTO-RTO: 0 /100 WBC (ref 0–0.2)
PLATELET # BLD AUTO: 257 10*3/MM3 (ref 140–450)
PMV BLD AUTO: 11.7 FL (ref 6–12)
POTASSIUM SERPL-SCNC: 4.8 MMOL/L (ref 3.5–5.2)
PROT SERPL-MCNC: 7.5 G/DL (ref 6–8.5)
RBC # BLD AUTO: 5.33 10*6/MM3 (ref 4.14–5.8)
SODIUM SERPL-SCNC: 141 MMOL/L (ref 136–145)
T4 FREE SERPL-MCNC: 1.33 NG/DL (ref 0.92–1.68)
TRIGL SERPL-MCNC: 310 MG/DL (ref 0–150)
TSH SERPL DL<=0.05 MIU/L-ACNC: 2.77 UIU/ML (ref 0.27–4.2)
VIT B12 BLD-MCNC: 636 PG/ML (ref 211–946)
VLDLC SERPL-MCNC: 52 MG/DL (ref 5–40)
WBC NRBC COR # BLD AUTO: 12.92 10*3/MM3 (ref 3.4–10.8)

## 2024-09-04 PROCEDURE — 85025 COMPLETE CBC W/AUTO DIFF WBC: CPT

## 2024-09-04 PROCEDURE — 84439 ASSAY OF FREE THYROXINE: CPT

## 2024-09-04 PROCEDURE — 82607 VITAMIN B-12: CPT

## 2024-09-04 PROCEDURE — 80053 COMPREHEN METABOLIC PANEL: CPT

## 2024-09-04 PROCEDURE — 36415 COLL VENOUS BLD VENIPUNCTURE: CPT

## 2024-09-04 PROCEDURE — 80061 LIPID PANEL: CPT

## 2024-09-04 PROCEDURE — 84443 ASSAY THYROID STIM HORMONE: CPT

## 2024-09-04 PROCEDURE — 83036 HEMOGLOBIN GLYCOSYLATED A1C: CPT

## 2024-09-04 PROCEDURE — 82746 ASSAY OF FOLIC ACID SERUM: CPT

## 2024-09-09 ENCOUNTER — OFFICE VISIT (OUTPATIENT)
Dept: INTERNAL MEDICINE | Age: 74
End: 2024-09-09
Payer: MEDICARE

## 2024-09-09 VITALS
TEMPERATURE: 97.1 F | DIASTOLIC BLOOD PRESSURE: 58 MMHG | HEART RATE: 89 BPM | BODY MASS INDEX: 31.24 KG/M2 | OXYGEN SATURATION: 94 % | WEIGHT: 194.4 LBS | SYSTOLIC BLOOD PRESSURE: 110 MMHG | HEIGHT: 66 IN

## 2024-09-09 DIAGNOSIS — E78.2 MIXED HYPERLIPIDEMIA: ICD-10-CM

## 2024-09-09 DIAGNOSIS — E11.65 TYPE 2 DIABETES MELLITUS WITH HYPERGLYCEMIA, WITH LONG-TERM CURRENT USE OF INSULIN: ICD-10-CM

## 2024-09-09 DIAGNOSIS — I10 PRIMARY HYPERTENSION: ICD-10-CM

## 2024-09-09 DIAGNOSIS — G63 POLYNEUROPATHY ASSOCIATED WITH UNDERLYING DISEASE: ICD-10-CM

## 2024-09-09 DIAGNOSIS — Z79.4 TYPE 2 DIABETES MELLITUS WITH HYPERGLYCEMIA, WITH LONG-TERM CURRENT USE OF INSULIN: ICD-10-CM

## 2024-09-09 DIAGNOSIS — Z00.00 MEDICARE ANNUAL WELLNESS VISIT, SUBSEQUENT: ICD-10-CM

## 2024-09-09 DIAGNOSIS — Z51.81 ENCOUNTER FOR MEDICATION MONITORING: Primary | ICD-10-CM

## 2024-09-09 PROBLEM — J01.00 ACUTE NON-RECURRENT MAXILLARY SINUSITIS: Status: RESOLVED | Noted: 2024-01-15 | Resolved: 2024-09-09

## 2024-09-09 PROCEDURE — 3074F SYST BP LT 130 MM HG: CPT | Performed by: INTERNAL MEDICINE

## 2024-09-09 PROCEDURE — 1160F RVW MEDS BY RX/DR IN RCRD: CPT | Performed by: INTERNAL MEDICINE

## 2024-09-09 PROCEDURE — 3051F HG A1C>EQUAL 7.0%<8.0%: CPT | Performed by: INTERNAL MEDICINE

## 2024-09-09 PROCEDURE — 99214 OFFICE O/P EST MOD 30 MIN: CPT | Performed by: INTERNAL MEDICINE

## 2024-09-09 PROCEDURE — 1125F AMNT PAIN NOTED PAIN PRSNT: CPT | Performed by: INTERNAL MEDICINE

## 2024-09-09 PROCEDURE — G0439 PPPS, SUBSEQ VISIT: HCPCS | Performed by: INTERNAL MEDICINE

## 2024-09-09 PROCEDURE — 3078F DIAST BP <80 MM HG: CPT | Performed by: INTERNAL MEDICINE

## 2024-09-09 PROCEDURE — 1170F FXNL STATUS ASSESSED: CPT | Performed by: INTERNAL MEDICINE

## 2024-09-09 PROCEDURE — 1159F MED LIST DOCD IN RCRD: CPT | Performed by: INTERNAL MEDICINE

## 2024-09-09 RX ORDER — DULOXETIN HYDROCHLORIDE 30 MG/1
CAPSULE, DELAYED RELEASE ORAL
Qty: 90 CAPSULE | Refills: 1 | Status: SHIPPED | OUTPATIENT
Start: 2024-09-09

## 2024-09-09 NOTE — ASSESSMENT & PLAN NOTE
BRODERICK completed 9/24.  Patient remains active and independent.  He does have occasional falls secondary to his peripheral neuropathy, but no significant trauma.  No hospitalizations in the past year.  We will get us a copy of his living will in the near future.

## 2024-09-09 NOTE — ASSESSMENT & PLAN NOTE
LDL is up from 78 to 96 as of his 9/24 OV.  He is on moderate dose atorvastatin, will continue same for now, but recommend repeat on return to office to ensure it is not continuing to climb.

## 2024-09-09 NOTE — ASSESSMENT & PLAN NOTE
Blood pressure stable and his pulse is in the mid 80s as of his 9/24 OV.  He can continue with low-dose lisinopril/HCT and moderate dose atenolol.

## 2024-09-09 NOTE — ASSESSMENT & PLAN NOTE
This is patient's worst issue still as of his 9/24 OV.  He saw neurology as noted above and we started low-dose Cymbalta when we saw him earlier this year.  We have titrated him to 60 mg, and we will go with 90 mg now.  Discussed with patient 120 is the typical max.  Asked him to call in about 6 weeks in regards to possibly titrating the medication further.

## 2024-09-09 NOTE — PROGRESS NOTES
Subjective   The ABCs of the Annual Wellness Visit  Medicare Wellness Visit      Norberto Whiteside is a 74 y.o. patient who presents for a Medicare Wellness Visit.    The following portions of the patient's history were reviewed and   updated as appropriate: allergies, current medications, past family history, past medical history, past social history, past surgical history, and problem list.    Compared to one year ago, the patient's physical   health is the same.  Compared to one year ago, the patient's mental   health is the same.    Recent Hospitalizations:  He was not admitted to the hospital during the last year.     Current Medical Providers:  Patient Care Team:  Moreno Pereira MD as PCP - General (Internal Medicine)  Wayne Foster DPM as Consulting Physician (Podiatry)  Konstantin Michael MD as Surgeon (Neurosurgery)  Ha Salter MD as Consulting Physician (Neurology)    Outpatient Medications Prior to Visit   Medication Sig Dispense Refill    aspirin 81 MG EC tablet Take 1 tablet by mouth Daily. 90 tablet 3    atenolol (TENORMIN) 50 MG tablet Take 1 tablet by mouth Every Night. 90 tablet 3    atorvastatin (LIPITOR) 40 MG tablet Take 1 tablet by mouth Every Night. 90 tablet 3    bisacodyl (DULCOLAX) 10 MG suppository Insert 1 suppository into the rectum Daily As Needed for Constipation. 15 each 1    docusate sodium (COLACE) 100 MG capsule Take 1 capsule by mouth 3 (Three) Times a Day. 270 capsule 3    Dulaglutide (Trulicity) 3 MG/0.5ML solution pen-injector Inject 0.5 mL under the skin into the appropriate area as directed 1 (One) Time Per Week. On Sundays 6 mL 1    DULoxetine (CYMBALTA) 60 MG capsule Take 1 capsule by mouth Daily. 90 capsule 1    empagliflozin (Jardiance) 25 MG tablet tablet Take 1 tablet by mouth Daily. 90 tablet 3    fluorouracil (EFUDEX) 5 % cream Apply 1 Application topically to the appropriate area as directed Daily As Needed.      Ibuprofen 3 %, Gabapentin 10 %,  Baclofen 2 %, lidocaine 4 %, Ketamine HCl 4 % Apply 1-2 g topically to the appropriate area as directed 3 (Three) to 4 (Four) times daily. 90 g 5    Insulin Glargine (LANTUS SOLOSTAR) 100 UNIT/ML injection pen Inject 15 Units under the skin into the appropriate area as directed Daily for 200 days. 3 mL 3    ipratropium (ATROVENT) 0.06 % nasal spray 2 sprays into the nostril(s) as directed by provider 4 (Four) Times a Day. 15 mL 3    lisinopril-hydrochlorothiazide (PRINZIDE,ZESTORETIC) 10-12.5 MG per tablet Take 1 tablet by mouth Every Night. 90 tablet 3    loratadine (CLARITIN) 10 MG tablet Take 1 tablet by mouth Daily. 90 tablet 3    metFORMIN (GLUCOPHAGE) 1000 MG tablet Take 1 tablet by mouth 2 (Two) Times a Day With Meals. 180 tablet 3    multivitamin (MULTIVITAMIN PO) Take 1 tablet by mouth Daily.      multivitamin with minerals tablet tablet Take 1 tablet by mouth Daily. 90 tablet 1    pantoprazole (PROTONIX) 40 MG EC tablet Take 1 tablet by mouth Daily. 90 tablet 3    pregabalin (Lyrica) 200 MG capsule Take 1 capsule by mouth 2 (Two) Times a Day. 180 capsule 1    triamcinolone (KENALOG) 0.1 % cream Apply 1 application topically to the appropriate area as directed 2 (Two) Times a Day. (Patient taking differently: Apply 1 Application topically to the appropriate area as directed 2 (Two) Times a Day As Needed for Irritation or Rash.) 30 g 1     No facility-administered medications prior to visit.     No opioid medication identified on active medication list. I have reviewed chart for other potential  high risk medication/s and harmful drug interactions in the elderly.      Aspirin is on active medication list. Aspirin use is indicated based on review of current medical condition/s. Pros and cons of this therapy have been discussed today. Benefits of this medication outweigh potential harm.  Patient has been encouraged to continue taking this medication.  .      Patient Active Problem List   Diagnosis    History  "of colonic polyps    Mixed hyperlipidemia    Unstable angina pectoris due to coronary arteriosclerosis    Type 2 diabetes mellitus with hyperglycemia, with long-term current use of insulin    Primary osteoarthritis involving multiple joints    Primary hypertension    Gastroesophageal reflux disease without esophagitis    Polyneuropathy associated with underlying disease    Medicare annual wellness visit, subsequent    Cervical spinal stenosis    Acute non-recurrent maxillary sinusitis     Advance Care Planning Advance Directive is on file.  ACP discussion was held with the patient during this visit. Patient has an advance directive in EMR which is still valid.             Objective   Vitals:    24 1256   BP: 110/58   Pulse: 89   Temp: 97.1 °F (36.2 °C)   TempSrc: Skin   SpO2: 94%   Weight: 88.2 kg (194 lb 6.4 oz)   Height: 167.6 cm (65.98\")       Estimated body mass index is 31.39 kg/m² as calculated from the following:    Height as of this encounter: 167.6 cm (65.98\").    Weight as of this encounter: 88.2 kg (194 lb 6.4 oz).            Does the patient have evidence of cognitive impairment? No  Lab Results   Component Value Date    TRIG 310 (H) 2024    HDL 36 (L) 2024    LDL 96 2024    VLDL 52 (H) 2024    HGBA1C 7.70 (H) 2024                                                                                               Health  Risk Assessment    Smoking Status:  Social History     Tobacco Use   Smoking Status Former    Current packs/day: 0.00    Average packs/day: 1 pack/day for 24.0 years (24.0 ttl pk-yrs)    Types: Cigarettes    Start date: 1972    Quit date: 3/1/1996    Years since quittin.5    Passive exposure: Past   Smokeless Tobacco Never     Alcohol Consumption:  Social History     Substance and Sexual Activity   Alcohol Use Yes    Alcohol/week: 2.0 standard drinks of alcohol    Types: 2 Glasses of wine per week    Comment: rare       Fall Risk Screen  STEADI Fall " Risk Assessment was completed, and patient is at HIGH risk for falls. Assessment completed on:2024    Depression Screenin/9/2024    12:55 PM   PHQ-2/PHQ-9 Depression Screening   Little Interest or Pleasure in Doing Things 0-->not at all   Feeling Down, Depressed or Hopeless 0-->not at all   PHQ-9: Brief Depression Severity Measure Score 0     Health Habits and Functional and Cognitive Screenin/9/2024    12:55 PM   Functional & Cognitive Status   Do you have difficulty preparing food and eating? No   Do you have difficulty bathing yourself, getting dressed or grooming yourself? No   Do you have difficulty using the toilet? No   Do you have difficulty moving around from place to place? No   Do you have trouble with steps or getting out of a bed or a chair? No   Current Diet Well Balanced Diet   Dental Exam Up to date   Eye Exam Up to date   Exercise (times per week) 2 times per week   Current Exercises Include Other   Do you need help using the phone?  No   Are you deaf or do you have serious difficulty hearing?  No   Do you need help to go to places out of walking distance? No   Do you need help shopping? No   Do you need help preparing meals?  No   Do you need help with housework?  No   Do you need help with laundry? No   Do you need help taking your medications? No   Do you need help managing money? No   Do you ever drive or ride in a car without wearing a seat belt? No   Have you felt unusual stress, anger or loneliness in the last month? No   Who do you live with? Child   If you need help, do you have trouble finding someone available to you? No   Have you been bothered in the last four weeks by sexual problems? No   Do you have difficulty concentrating, remembering or making decisions? No           Age-appropriate Screening Schedule:  Refer to the list below for future screening recommendations based on patient's age, sex and/or medical conditions. Orders for these recommended tests are  listed in the plan section. The patient has been provided with a written plan.    Health Maintenance List  Health Maintenance   Topic Date Due    DIABETIC EYE EXAM  02/04/2024    ANNUAL WELLNESS VISIT  06/08/2024    COVID-19 Vaccine (6 - 2023-24 season) 09/01/2024    INFLUENZA VACCINE  08/01/2024    URINE MICROALBUMIN  01/09/2025    DIABETIC FOOT EXAM  02/07/2025    HEMOGLOBIN A1C  03/04/2025    BMI FOLLOWUP  07/03/2025    LIPID PANEL  09/04/2025    COLORECTAL CANCER SCREENING  01/12/2027    TDAP/TD VACCINES (2 - Td or Tdap) 07/26/2032    HEPATITIS C SCREENING  Completed    Pneumococcal Vaccine 65+  Completed    AAA SCREEN (ONE-TIME)  Completed    ZOSTER VACCINE  Completed                                                                                                                                                CMS Preventative Services Quick Reference  Risk Factors Identified During Encounter  None Identified    The above risks/problems have been discussed with the patient.  Pertinent information has been shared with the patient in the After Visit Summary.  An After Visit Summary and PPPS were made available to the patient.    Follow Up:   Next Medicare Wellness visit to be scheduled in 1 year.     Assessment & Plan  Encounter for medication monitoring    Medicare annual wellness visit, subsequent  AWV completed 9/24.  Patient remains active and independent.  He does have occasional falls secondary to his peripheral neuropathy, but no significant trauma.  No hospitalizations in the past year.  We will get us a copy of his living will in the near future.  Polyneuropathy associated with underlying disease  This is patient's worst issue still as of his 9/24 OV.  He saw neurology as noted above and we started low-dose Cymbalta when we saw him earlier this year.  We have titrated him to 60 mg, and we will go with 90 mg now.  Discussed with patient 120 is the typical max.  Asked him to call in about 6 weeks in regards  to possibly titrating the medication further.    Orders Placed This Encounter   Procedures    Urine Drug Screen - Urine, Clean Catch     New Medications Ordered This Visit   Medications    DULoxetine (CYMBALTA) 30 MG capsule     Sig: Take 1 capsule daily with the 60 mg duloxetine.     Dispense:  90 capsule     Refill:  1          Follow Up:   No follow-ups on file.

## 2024-09-09 NOTE — ASSESSMENT & PLAN NOTE
Patient is A1c is up from 6.3 to 7.7 as of his 9/24 OV.  He is maxed out on metformin and Jardiance.  He is on just low-dose 15 units of insulin, his sugar was in the 150 ballpark the day of his labs, and he gets somewhere in the 130-40 ballpark in the morning at home.  Discussed with patient he could titrate this by couple units every 5 days or so until fasting sugars are in the 120 or lower ballpark on average.    He had to back off on Trulicity to every 2 to 3 weeks or so due to some persistent nausea.  He will try to get back on this, we may need to try a lower dose down the road, and/or go back to Januvia etc.

## 2024-09-09 NOTE — PROGRESS NOTES
"Chief Complaint  Diabetes, Follow-up (Pt states that this is routine, he had labs. He has no new issues.), and Medicare Wellness-subsequent    Subjective          Norberto Whiteside presents to [facility]     History of present illness:  Patient 73-year-old male with underlying diabetes mellitus, hypertension, hyperlipidemia, resultant coronary artery disease with prior stent, who is coming in 5/24 for routine 3-4-month follow-up.  We will review his labs and make further recommendations at that time.    ---> Patient seen for hospital follow-up 1/24, underwent ACDF for C4-5 spinal stenosis per Dr. Michael.    Review of Systems   Constitutional:  Negative for appetite change, fatigue and fever.   HENT:  Negative for congestion and ear pain.    Eyes:  Negative for blurred vision.   Respiratory:  Negative for cough, chest tightness, shortness of breath and wheezing.    Cardiovascular:  Negative for chest pain, palpitations and leg swelling.   Gastrointestinal:  Negative for abdominal pain.   Genitourinary:  Negative for difficulty urinating, dysuria and hematuria.   Musculoskeletal:  Negative for arthralgias and gait problem.   Skin:  Negative for skin lesions.   Neurological:  Negative for syncope, memory problem and confusion.   Psychiatric/Behavioral:  Negative for self-injury and depressed mood.        Objective   Vital Signs:   /58   Pulse 89   Temp 97.1 °F (36.2 °C) (Skin)   Ht 167.6 cm (65.98\")   Wt 88.2 kg (194 lb 6.4 oz)   SpO2 94%   BMI 31.39 kg/m²           Physical Exam  Vitals and nursing note reviewed.   Constitutional:       General: He is not in acute distress.     Appearance: Normal appearance. He is not toxic-appearing.   HENT:      Head: Atraumatic.      Right Ear: External ear normal.      Left Ear: External ear normal.      Nose: Nose normal.      Mouth/Throat:      Mouth: Mucous membranes are moist.   Eyes:      General:         Right eye: No discharge.   "       Left eye: No discharge.      Extraocular Movements: Extraocular movements intact.      Pupils: Pupils are equal, round, and reactive to light.   Cardiovascular:      Rate and Rhythm: Normal rate and regular rhythm.      Pulses: Normal pulses.      Heart sounds: Normal heart sounds. No murmur heard.     No gallop.   Pulmonary:      Effort: Pulmonary effort is normal. No respiratory distress.      Breath sounds: No wheezing, rhonchi or rales.   Abdominal:      General: There is no distension.      Palpations: Abdomen is soft. There is no mass.      Tenderness: There is no abdominal tenderness. There is no guarding.   Musculoskeletal:         General: No swelling or tenderness.      Cervical back: No tenderness.      Right lower leg: No edema.      Left lower leg: No edema.   Skin:     General: Skin is warm and dry.      Findings: No rash.   Neurological:      General: No focal deficit present.      Mental Status: He is alert and oriented to person, place, and time. Mental status is at baseline.      Motor: No weakness.      Gait: Gait normal.   Psychiatric:         Mood and Affect: Mood normal.         Thought Content: Thought content normal.          Result Review :   The following data was reviewed by: Moreno Pereira MD on 10/05/2021:                  Assessment and Plan    Diagnoses and all orders for this visit:    1. Encounter for medication monitoring (Primary)  -     Urine Drug Screen - Urine, Clean Catch; Future    2. Medicare annual wellness visit, subsequent  Assessment & Plan:  AWV completed 9/24.  Patient remains active and independent.  He does have occasional falls secondary to his peripheral neuropathy, but no significant trauma.  No hospitalizations in the past year.  We will get us a copy of his living will in the near future.      3. Polyneuropathy associated with underlying disease  Overview:  Dr Salter's note 12/23:  Nerve conduction study and limited EMG study is abnormal and shows  electrophysiologic evidence for severe axonal sensorimotor polyneuropathy in the upper extremities. Clinically he has severe diabetic neuropathy involving the upper and lower extremities. I discussed with him and his daughter that he has a separate problem with cervical spinal stenosis and lumbar spine spinal stenosis which are separate from his diagnosis of diabetic polyneuropathy. I discussed with him that Dr. Pereira may try him on Cymbalta on top of his Lyrica. Thank you for letting me participate in his care.     Assessment & Plan:  This is patient's worst issue still as of his 9/24 OV.  He saw neurology as noted above and we started low-dose Cymbalta when we saw him earlier this year.  We have titrated him to 60 mg, and we will go with 90 mg now.  Discussed with patient 120 is the typical max.  Asked him to call in about 6 weeks in regards to possibly titrating the medication further.      4. Primary hypertension  Assessment & Plan:  Blood pressure stable and his pulse is in the mid 80s as of his 9/24 OV.  He can continue with low-dose lisinopril/HCT and moderate dose atenolol.    Orders:  -     Comprehensive Metabolic Panel; Future    5. Mixed hyperlipidemia  Assessment & Plan:  LDL is up from 78 to 96 as of his 9/24 OV.  He is on moderate dose atorvastatin, will continue same for now, but recommend repeat on return to office to ensure it is not continuing to climb.    Orders:  -     Lipid Panel; Future    6. Type 2 diabetes mellitus with hyperglycemia, with long-term current use of insulin  Assessment & Plan:  Patient is A1c is up from 6.3 to 7.7 as of his 9/24 OV.  He is maxed out on metformin and Jardiance.  He is on just low-dose 15 units of insulin, his sugar was in the 150 ballpark the day of his labs, and he gets somewhere in the 130-40 ballpark in the morning at home.  Discussed with patient he could titrate this by couple units every 5 days or so until fasting sugars are in the 120 or lower ballpark  on average.    He had to back off on Trulicity to every 2 to 3 weeks or so due to some persistent nausea.  He will try to get back on this, we may need to try a lower dose down the road, and/or go back to Januvia etc.    Orders:  -     Hemoglobin A1c; Future    Other orders  -     DULoxetine (CYMBALTA) 30 MG capsule; Take 1 capsule daily with the 60 mg duloxetine.  Dispense: 90 capsule; Refill: 1        Total Time Spent:  minutes   This time includes time spent by me in the following activities: preparing for the visit, reviewing extensive past medical history and tests, performing a medically appropriate examination and/or evaluation, counseling and educating the patient and/or caregivers, ordering medications, tests, or procedures, referring and/or communicating with other health care professionals and documenting information in the medical record all on this date of service.         --  --  OLDER NOTES:  VISIT 6/21:  ANNUAL MEDICARE WELLNESS PHYSICAL 9/17 = reviewed all forms with pt in office; no new concerns raised.  DM = A1C as below and OPTHO=20/20 and saw them in spring '18.  --  DM 2 ('15) and was started on Tanzem 3 mo ago and low dose amaryl was stopped; needs repeat labs, but FBS still in 280 ballpark; last A1C=9.9; will increase Tanzem before add another agent...down 10.8 to 8.5 past 4 months, so no invokana yet...7.6 is great trend, so no changes 1/18...8.5 again so needs jardiance/etc now since this is despite wt down some at 5/18 OV; also, may lose tanzem he tells me...8.2 is w/o any new agents, wt is still trending down, so will wait until after new year to add another if still in the 8's...8.4 and he will find out which one is covered...8.3 and got on Jardiance, so need to max it out as of 5/19 OV...7.9 is ok for now at least...8.1 and he blames it on his diet around holiday time; maxed out on 3 meds; will use lantus if same on RTO...8.3 and I d/w he needs Lantus now=10U qd and titrate...7.4 is  nice drop already 9/20---> 7.1 is better 6/21.  (Micro-alb neg 5/20)  --  CAD s/p PTCA '96 with need for SPECT soonish=been too long it sound like; no sxs at least---> needs eval as of 6/21 for dizzy/weak/feels off;   LIPIDS with LDL 14 and TG's 400, ? lab error; will repeat prior to changes...LDL 43 with TG's < 200 is fine...LDL 37/TG's 300 baseline 12/19...LDL 27, so will lower dose now to 40 mg---> 60 is better 1/21  --  HTN remains well controlled and ok to lower to 1/2 tab ACEI/HCT if stays low at home.  ?CKD3 = 52% and will get on RTO in '19... >60%... 40% out of the blue 9/19---> 55% holding 6/21.  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  NEUROPATHY is worse 5/20 and I d/w anodyne is an option; worse when active; will use PRN ultram.  OBESITY with BMI 37 ballpark (TSH neg 5/18).  --  --  PSA 0.8 (5/6/2024)   COLON 1/22 = 2 polyps = 5 yrs per Dr eMade.  Pneumovax x1 ; Prevnar 9/17.  ( 10/21 after 32 years of marriage and she was 10 yrs older = 81 when she passMisty segundo had severe dementia at the end; retired  and then  and retired '14, 1 girl in town)    Follow Up   Return in about 4 months (around 1/9/2025).  Patient was given instructions and counseling regarding his condition or for health maintenance advice. Please see specific information pulled into the AVS if appropriate.

## 2024-09-30 ENCOUNTER — HOSPITAL ENCOUNTER (OUTPATIENT)
Dept: GENERAL RADIOLOGY | Facility: HOSPITAL | Age: 74
Discharge: HOME | End: 2024-09-30
Admitting: PHYSICIAN ASSISTANT
Payer: MEDICARE

## 2024-09-30 DIAGNOSIS — Z98.1 STATUS POST CERVICAL SPINAL FUSION: ICD-10-CM

## 2024-09-30 DIAGNOSIS — M50.221 HERNIATED NUCLEUS PULPOSUS, C4-5: ICD-10-CM

## 2024-09-30 PROCEDURE — 72050 X-RAY EXAM NECK SPINE 4/5VWS: CPT

## 2024-10-03 ENCOUNTER — OFFICE VISIT (OUTPATIENT)
Dept: NEUROSURGERY | Facility: CLINIC | Age: 74
End: 2024-10-03
Payer: MEDICARE

## 2024-10-03 VITALS
HEIGHT: 66 IN | SYSTOLIC BLOOD PRESSURE: 124 MMHG | BODY MASS INDEX: 30.22 KG/M2 | WEIGHT: 188 LBS | HEART RATE: 93 BPM | DIASTOLIC BLOOD PRESSURE: 65 MMHG

## 2024-10-03 DIAGNOSIS — M54.42 CHRONIC MIDLINE LOW BACK PAIN WITH BILATERAL SCIATICA: ICD-10-CM

## 2024-10-03 DIAGNOSIS — M50.221 HERNIATED NUCLEUS PULPOSUS, C4-5: Primary | ICD-10-CM

## 2024-10-03 DIAGNOSIS — M48.061 SPINAL STENOSIS, LUMBAR REGION, WITHOUT NEUROGENIC CLAUDICATION: ICD-10-CM

## 2024-10-03 DIAGNOSIS — M54.41 CHRONIC MIDLINE LOW BACK PAIN WITH BILATERAL SCIATICA: ICD-10-CM

## 2024-10-03 DIAGNOSIS — Z98.1 STATUS POST CERVICAL SPINAL FUSION: ICD-10-CM

## 2024-10-03 DIAGNOSIS — G89.29 CHRONIC MIDLINE LOW BACK PAIN WITH BILATERAL SCIATICA: ICD-10-CM

## 2024-10-03 NOTE — PROGRESS NOTES
Patient being seen for today history of ACDF at C4-C5  .    Subjective    Norberto Whiteside is a 74 y.o. male that presents with Results (X-ray results. )  .    HPI  Previously: Last seen on 7/3/2024 for numbness in the right shoulder down to the tip of the fifth digit of the right hand.  This was worse at nighttime. He was status post ACDF using right approach at C4-C5 on 1/10/2024. He had x-ray of the cervical spine on 6/27/2024 showing stable ACDF at C4-C5.  He had previous NCV/EMG testing on 12/20/2023 showing severe polyneuropathy of the upper extremities.  We did discuss that the polyneuropathy is probably a better explanation for his right arm numbness and a change in the spine would be.  There was plan to monitor for new or worsening complaints and follow-up in 3 months with x-ray to monitor the ACDF at C4-C5.    Today: He denies any major pain today. He denies new complaints today.     reports that he quit smoking about 28 years ago. His smoking use included cigarettes. He started smoking about 52 years ago. He has a 24 pack-year smoking history. He has been exposed to tobacco smoke. He has never used smokeless tobacco.    Review of Systems   Musculoskeletal:  Negative for neck pain.       Objective   Vitals:    10/03/24 1053   BP: 124/65   Pulse: 93        Physical Exam  Constitutional:       Appearance: Normal appearance. He is obese.   Pulmonary:      Effort: Pulmonary effort is normal.   Musculoskeletal:         General: Tenderness (mild in midline cervical area) present.      Cervical back: Normal range of motion.      Comments: Petty's negative bilaterally   Neurological:      General: No focal deficit present.      Mental Status: He is alert and oriented to person, place, and time.      Sensory: No sensory deficit.      Motor: No weakness.      Deep Tendon Reflexes: Reflexes normal.   Psychiatric:         Mood and Affect: Mood normal.         Behavior: Behavior normal.          Result Review   I have  personally interpreted the x-ray of cervical spine from 9/30/2024 which shows stable ACDF at C4-C5 with stable adjacent level spondylosis greatest at C5-C6.     Assessment and Plan {CC Problem List  Visit Diagnosis  ROS  Review (Popup)  [facility]  Quality  BestPractice  Medications  SmartSets  SnapShot Encounters  Media :23}   Diagnoses and all orders for this visit:    1. Herniated nucleus pulposus, C4-5 (Primary)  -     XR Spine Cervical Complete 4 or 5 View; Future    2. Status post cervical spinal fusion  -     XR Spine Cervical Complete 4 or 5 View; Future    3. Spinal stenosis, lumbar region, without neurogenic claudication    4. Chronic midline low back pain with bilateral sciatica    He is doing well as far as the neck goes. The ACDF at C4-C5 is stable.    He will monitor for new or worsening complaints and notify us of change.    We did discuss his back/leg pain today. He intermittent has pain in the legs to the heel. The pain alternates from left to right leg, but mostly affects the right leg. It is not persistent or severe. He does have severe central canal stenosis at L3-L4 and L4-L5. He is a candidate for surgery, but considering the leg pain description today, I expect it is unlikely to help. He will monitor for worsening or persistent leg pain and notify us. He may consider spinal injections as an alternative treatment. For now he is deferring further treatment.    For the neck, he will follow-up in 3 months with x-ray to monitor the ACDF at C4-C5.    Follow Up {Instructions Charge Capture  Follow-up Communications :23}   Return in about 3 months (around 1/3/2025).

## 2024-10-28 ENCOUNTER — TELEPHONE (OUTPATIENT)
Dept: INTERNAL MEDICINE | Age: 74
End: 2024-10-28
Payer: MEDICARE

## 2024-10-28 DIAGNOSIS — E11.65 TYPE 2 DIABETES MELLITUS WITH HYPERGLYCEMIA, WITH LONG-TERM CURRENT USE OF INSULIN: Primary | ICD-10-CM

## 2024-10-28 DIAGNOSIS — Z79.4 TYPE 2 DIABETES MELLITUS WITH HYPERGLYCEMIA, WITH LONG-TERM CURRENT USE OF INSULIN: Primary | ICD-10-CM

## 2024-10-31 ENCOUNTER — CLINICAL SUPPORT (OUTPATIENT)
Dept: INTERNAL MEDICINE | Age: 74
End: 2024-10-31
Payer: MEDICARE

## 2024-10-31 ENCOUNTER — OFFICE VISIT (OUTPATIENT)
Dept: PODIATRY | Facility: CLINIC | Age: 74
End: 2024-10-31
Payer: MEDICARE

## 2024-10-31 VITALS
HEART RATE: 103 BPM | WEIGHT: 186 LBS | OXYGEN SATURATION: 98 % | DIASTOLIC BLOOD PRESSURE: 75 MMHG | TEMPERATURE: 98.3 F | BODY MASS INDEX: 30.04 KG/M2 | SYSTOLIC BLOOD PRESSURE: 110 MMHG

## 2024-10-31 DIAGNOSIS — G62.9 NEUROPATHY: ICD-10-CM

## 2024-10-31 DIAGNOSIS — Z79.4 TYPE 2 DIABETES MELLITUS WITH DIABETIC POLYNEUROPATHY, WITH LONG-TERM CURRENT USE OF INSULIN: ICD-10-CM

## 2024-10-31 DIAGNOSIS — B35.1 ONYCHOMYCOSIS: ICD-10-CM

## 2024-10-31 DIAGNOSIS — E11.8 DM FEET: ICD-10-CM

## 2024-10-31 DIAGNOSIS — M79.672 FOOT PAIN, BILATERAL: ICD-10-CM

## 2024-10-31 DIAGNOSIS — L60.0 ONYCHOCRYPTOSIS: Primary | ICD-10-CM

## 2024-10-31 DIAGNOSIS — M79.671 FOOT PAIN, BILATERAL: ICD-10-CM

## 2024-10-31 DIAGNOSIS — Z23 NEED FOR IMMUNIZATION AGAINST INFLUENZA: Primary | ICD-10-CM

## 2024-10-31 DIAGNOSIS — E11.42 TYPE 2 DIABETES MELLITUS WITH DIABETIC POLYNEUROPATHY, WITH LONG-TERM CURRENT USE OF INSULIN: ICD-10-CM

## 2024-10-31 PROCEDURE — G0008 ADMIN INFLUENZA VIRUS VAC: HCPCS | Performed by: INTERNAL MEDICINE

## 2024-10-31 PROCEDURE — 90662 IIV NO PRSV INCREASED AG IM: CPT | Performed by: INTERNAL MEDICINE

## 2024-10-31 NOTE — PROGRESS NOTES
[facility]    Today's Date: 10/31/24    Patient Name: Norberto Whiteside  MRN: [id number]  CSN: [id number]  PCP: Moreno Pereira MD, Last PCP Visit: 9/9/2024  Referring Provider: No ref. provider found    SUBJECTIVE     Chief Complaint   Patient presents with    Left Foot - Follow-up, Nail Problem    Right Foot - Follow-up, Nail Problem     HPI: Norberto Whiteside, a 74 y.o.male, comes to clinic.    New, Established, New Problem:  est    Location:  Toenails    Duration:   Greater than five years    Onset:  Gradual    Nature:  sore with palpation.    Stable, worsening, improving:   Stable    Aggravating factors:  Pain with shoe gear and ambulation.    Previous Treatment:  debridement    Patient states their most recent blood glucose reading was  147    Medical changes:  none    Patient denies any fevers, chills, nausea, vomiting, shortness of breathe, nor any other constitutional signs nor symptoms.       I have reviewed/confirmed previously documented HPI with no changes.       Past Medical History:   Diagnosis Date    Acid reflux     Allergies     seasonal    Anxiety     Callus     Cancer     skin cancer    Diabetes mellitus     ave -140    Difficulty walking 2014    Balancing difficulty    Hard of hearing     Hyperlipidemia     Hypertension     MI (myocardial infarction) 1996    follows with PCP    Neck pain     Neuropathy     legs, feet, arms, hands, shoulders    Sciatica     Skin cancer     Sleep apnea     Vision loss 1962     Past Surgical History:   Procedure Laterality Date    ANGIOPLASTY  1996    no stents    ANTERIOR CERVICAL DISCECTOMY W/ FUSION Right 1/10/2024    Procedure: ANTERIOR CERVICAL DISCECTOMY AND FUSION USING ALLOGRAFT BONE AND INSTRUMENTATION, right approach, 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);  Surgeon: Konstantin Michael MD;  Location: [facility] MAIN OR;  Service: Neurosurgery;  Laterality: Right;    COLONOSCOPY N/A 01/12/2022     Procedure: COLONOSCOPY with polypectomies;  Surgeon: Kanu Meade MD;  Location: [facility] ENDOSCOPY;  Service: General;  Laterality: N/A;  colon polyps     Family History   Problem Relation Age of Onset    Stroke Mother         Stroke around     Cancer Mother     Cancer Father     Cancer Sister     Neuropathy Brother     Ariel Hyperthermia Neg Hx      Social History     Socioeconomic History    Marital status:    Tobacco Use    Smoking status: Former     Current packs/day: 0.00     Average packs/day: 1 pack/day for 24.0 years (24.0 ttl pk-yrs)     Types: Cigarettes     Start date: 1972     Quit date: 3/1/1996     Years since quittin.6     Passive exposure: Past    Smokeless tobacco: Never   Vaping Use    Vaping status: Never Used   Substance and Sexual Activity    Alcohol use: Yes     Alcohol/week: 2.0 standard drinks of alcohol     Types: 2 Glasses of wine per week     Comment: rare    Drug use: Not Currently     Frequency: 3.0 times per week     Types: Marijuana     Comment: Helps relieve neuropathy pain in feet and arthritis pain in    Sexual activity: Not Currently     Partners: Female     Birth control/protection: None     Comment: ED     No Known Allergies  Current Outpatient Medications   Medication Sig Dispense Refill    aspirin 81 MG EC tablet Take 1 tablet by mouth Daily. 90 tablet 3    atenolol (TENORMIN) 50 MG tablet Take 1 tablet by mouth Every Night. 90 tablet 3    atorvastatin (LIPITOR) 40 MG tablet Take 1 tablet by mouth Every Night. 90 tablet 3    bisacodyl (DULCOLAX) 10 MG suppository Insert 1 suppository into the rectum Daily As Needed for Constipation. 15 each 1    docusate sodium (COLACE) 100 MG capsule Take 1 capsule by mouth 3 (Three) Times a Day. 270 capsule 3    Dulaglutide (Trulicity) 3 MG/0.5ML solution pen-injector Inject 0.5 mL under the skin into the appropriate area as directed 1 (One) Time Per Week. On Sundays 6 mL 1    DULoxetine (CYMBALTA) 30 MG capsule Take 1  capsule daily with the 60 mg duloxetine. 90 capsule 1    DULoxetine (CYMBALTA) 60 MG capsule Take 1 capsule by mouth Daily. 90 capsule 1    empagliflozin (Jardiance) 25 MG tablet tablet Take 1 tablet by mouth Daily. 90 tablet 3    fluorouracil (EFUDEX) 5 % cream Apply 1 Application topically to the appropriate area as directed Daily As Needed.      glucose blood test strip Precision extra test strips Test BS  each 12    Ibuprofen 3 %, Gabapentin 10 %, Baclofen 2 %, lidocaine 4 %, Ketamine HCl 4 % Apply 1-2 g topically to the appropriate area as directed 3 (Three) to 4 (Four) times daily. 90 g 5    Insulin Glargine (LANTUS SOLOSTAR) 100 UNIT/ML injection pen Inject 15 Units under the skin into the appropriate area as directed Daily for 200 days. 3 mL 3    ipratropium (ATROVENT) 0.06 % nasal spray 2 sprays into the nostril(s) as directed by provider 4 (Four) Times a Day. 15 mL 3    lisinopril-hydrochlorothiazide (PRINZIDE,ZESTORETIC) 10-12.5 MG per tablet Take 1 tablet by mouth Every Night. 90 tablet 3    loratadine (CLARITIN) 10 MG tablet Take 1 tablet by mouth Daily. 90 tablet 3    metFORMIN (GLUCOPHAGE) 1000 MG tablet Take 1 tablet by mouth 2 (Two) Times a Day With Meals. 180 tablet 3    multivitamin (MULTIVITAMIN PO) Take 1 tablet by mouth Daily.      multivitamin with minerals tablet tablet Take 1 tablet by mouth Daily. 90 tablet 1    pantoprazole (PROTONIX) 40 MG EC tablet Take 1 tablet by mouth Daily. 90 tablet 3    pregabalin (Lyrica) 200 MG capsule Take 1 capsule by mouth 2 (Two) Times a Day. 180 capsule 1    triamcinolone (KENALOG) 0.1 % cream Apply 1 application topically to the appropriate area as directed 2 (Two) Times a Day. (Patient taking differently: Apply 1 Application topically to the appropriate area as directed 2 (Two) Times a Day As Needed for Irritation or Rash.) 30 g 1     No current facility-administered medications for this visit.     Review of Systems   Constitutional: Negative.     Skin:         Painful toenails   Neurological:  Positive for numbness.   All other systems reviewed and are negative.      OBJECTIVE     Vitals:    10/31/24 1432   BP: 110/75   Pulse: 103   Temp: 98.3 °F (36.8 °C)   SpO2: 98%             Lab Results   Component Value Date    HGBA1C 7.70 (H) 09/04/2024       Lab Results   Component Value Date    GLUCOSE 154 (H) 09/04/2024    CALCIUM 10.5 09/04/2024     09/04/2024    K 4.8 09/04/2024    CO2 28.0 09/04/2024    CL 99 09/04/2024    BUN 19 09/04/2024    CREATININE 1.05 09/04/2024    EGFRIFNONA 69 01/14/2022    BCR 18.1 09/04/2024    ANIONGAP 14.0 09/04/2024       Patient seen in no apparent distress.      PHYSICAL EXAM:     Foot/Ankle Exam    GENERAL  Appearance:  elderly  Orientation:  AAOx3  Affect:  appropriate  Gait:  antalgic  Assistance:  cane use  Right shoe gear: casual shoe  Left shoe gear: casual shoe    VASCULAR     Right Foot Vascularity   Dorsalis pedis:  1+  Posterior tibial:  1+  Skin temperature:  cool  Edema grading:  None  CFT:  < 3 seconds  Pedal hair growth:  Present  Varicosities:  mild varicosities     Left Foot Vascularity   Dorsalis pedis:  1+  Posterior tibial:  1+  Skin temperature:  cool  Edema grading:  None  CFT:  < 3 seconds  Pedal hair growth:  Present  Varicosities:  mild varicosities     NEUROLOGIC     Right Foot Neurologic   Light touch sensation: diminished  Vibratory sensation: diminished  Hot/Cold sensation: diminished  Protective Sensation using Coyote-Keiko Monofilament:   Sites intact: 3  Sites tested: 10     Left Foot Neurologic   Light touch sensation: diminished  Vibratory sensation: diminished  Hot/Cold sensation:  diminished  Protective Sensation using Coyote-Keiko Monofilament:   Sites intact: 3  Sites tested: 10    MUSCLE STRENGTH     Right Foot Muscle Strength   Foot dorsiflexion:  4-  Foot plantar flexion:  4-  Foot inversion:  4-  Foot eversion:  4-     Left Foot Muscle Strength   Foot dorsiflexion:   4-  Foot plantar flexion:  4-  Foot inversion:  4-  Foot eversion:  4-    RANGE OF MOTION     Right Foot Range of Motion   Foot and ankle ROM within normal limits       Left Foot Range of Motion   Foot and ankle ROM within normal limits      DERMATOLOGIC      Right Foot Dermatologic   Skin  Right foot skin is intact.   Nails  1.  Positive for elongated, onychomycosis, abnormal thickness, subungual debris and ingrown toenail.  2.  Positive for elongated, onychomycosis, abnormal thickness, subungual debris and ingrown toenail.  3.  Positive for elongated, onychomycosis, abnormal thickness, subungual debris and ingrown toenail.  4.  Positive for elongated, onychomycosis, abnormal thickness, subungual debris and ingrown toenail.  5.  Positive for elongated, onychomycosis, abnormal thickness, subungual debris and ingrown toenail.  Nails comment:  Toenails 1, 2, 3, 4, and 5     Left Foot Dermatologic   Skin  Left foot skin is intact.   Nails comment:  Toenails 1, 2, 3, 4, and 5  Nails  1.  Positive for elongated, onychomycosis, abnormal thickness, subungual debris and ingrown toenail.  2.  Positive for elongated, onychomycosis, abnormal thickness, subungual debris and ingrown toenail.  3.  Positive for elongated, onychomycosis, abnormal thickness, subungual debris and ingrown toenail.  4.  Positive for elongated, onychomycosis, abnormally thick, subungual debris and ingrown toenail.  5.  Positive for elongated, onychomycosis, abnormally thick, subungual debris and ingrown toenail.    I have reexamined the patient the results are consistent with the previously documented exam.    ASSESSMENT/PLAN     Diagnoses and all orders for this visit:    1. Onychocryptosis (Primary)    2. Onychomycosis    3. Neuropathy    4. Type 2 diabetes mellitus with diabetic polyneuropathy, with long-term current use of insulin    5. Foot pain, bilateral    6. DM feet    Comprehensive lower extremity examination and evaluation was  performed.    Discussed findings and treatment plan including risks, benefits, and treatment options with patient in detail. Patient agreed with treatment plan.    Toenails 1 through 5 bilaterally were debrided in thickness and length and then smoothed with a Dremel Tool.  Tolerated the procedure well without complications.    An After Visit Summary was printed and given to the patient at discharge, including (if requested) any available informative/educational handouts regarding diagnosis, treatment, or medications. All questions were answered to patient/family satisfaction. Should symptoms fail to improve or worsen they agree to call or return to clinic or to go to the Emergency Department. Discussed the importance of following up with any needed screening tests/labs/specialist appointments and any requested follow-up recommended by me today. Importance of maintaining follow-up discussed and patient accepts that missed appointments can delay diagnosis and potentially lead to worsening of conditions.    Return in about 9 weeks (around 1/2/2025) for Toenail Care., or sooner if acute issues arise.    I have reviewed the assessment and plan and verified the accuracy of it. No changes to assessment and plan since the information was documented. Wayne Foster DPM 10/31/24     I have dictated this note utilizing Dragon Dictation.  Please note that portions of this note were completed with a voice recognition program.  Part of this note may be an electronic transcription/translation of spoken language to printed text using the Dragon Dictation System.      This document has been electronically signed by Wayne Foster DPM on October 31, 2024 14:49 EDT

## 2024-11-04 ENCOUNTER — TELEPHONE (OUTPATIENT)
Dept: INTERNAL MEDICINE | Age: 74
End: 2024-11-04
Payer: MEDICARE

## 2024-11-04 DIAGNOSIS — Z79.4 TYPE 2 DIABETES MELLITUS WITH HYPERGLYCEMIA, WITH LONG-TERM CURRENT USE OF INSULIN: Primary | ICD-10-CM

## 2024-11-04 DIAGNOSIS — E11.65 TYPE 2 DIABETES MELLITUS WITH HYPERGLYCEMIA, WITH LONG-TERM CURRENT USE OF INSULIN: Primary | ICD-10-CM

## 2024-11-04 RX ORDER — LANCETS 30 GAUGE
1 EACH MISCELLANEOUS 2 TIMES DAILY
Qty: 200 EACH | Refills: 5 | Status: SHIPPED | OUTPATIENT
Start: 2024-11-04

## 2024-11-04 RX ORDER — BLOOD-GLUCOSE METER
1 KIT MISCELLANEOUS 2 TIMES DAILY
Qty: 1 EACH | Refills: 0 | Status: SHIPPED | OUTPATIENT
Start: 2024-11-04

## 2024-11-04 NOTE — TELEPHONE ENCOUNTER
Pt needs a new script for free style meter,lancets, and test strips. [facility] does not carry his old brand . He test BID

## 2024-12-30 ENCOUNTER — HOSPITAL ENCOUNTER (OUTPATIENT)
Dept: GENERAL RADIOLOGY | Facility: HOSPITAL | Age: 74
Discharge: HOME | End: 2024-12-30
Admitting: PHYSICIAN ASSISTANT
Payer: MEDICARE

## 2024-12-30 DIAGNOSIS — Z98.1 STATUS POST CERVICAL SPINAL FUSION: ICD-10-CM

## 2024-12-30 DIAGNOSIS — M50.221 HERNIATED NUCLEUS PULPOSUS, C4-5: ICD-10-CM

## 2024-12-30 PROCEDURE — 72050 X-RAY EXAM NECK SPINE 4/5VWS: CPT

## 2025-01-02 ENCOUNTER — OFFICE VISIT (OUTPATIENT)
Dept: NEUROSURGERY | Facility: CLINIC | Age: 75
End: 2025-01-02
Payer: MEDICARE

## 2025-01-02 VITALS
DIASTOLIC BLOOD PRESSURE: 80 MMHG | HEART RATE: 81 BPM | SYSTOLIC BLOOD PRESSURE: 145 MMHG | BODY MASS INDEX: 30.37 KG/M2 | WEIGHT: 189 LBS | HEIGHT: 66 IN

## 2025-01-02 DIAGNOSIS — M50.221 HERNIATED NUCLEUS PULPOSUS, C4-5: Primary | ICD-10-CM

## 2025-01-02 DIAGNOSIS — Z98.1 STATUS POST CERVICAL SPINAL FUSION: ICD-10-CM

## 2025-01-02 NOTE — PROGRESS NOTES
"Patient being seen for today for Neck Pain (1 year follow up )  .    Subjective    Norberto Whitseide is a 74 y.o. male that presents with Neck Pain (1 year follow up )  .    HPI  Previously: Last seen on 10/3/2024 status post ACDF using right approach at C4-C5 on 1/10/2024.  He was doing well at that time as far as his neck clinic.  The ACDF at C4-C5 was stable.  He was going to monitor for new or worsening complaints and notify us of change.  There is plan to follow-up in 3 months with x-ray to monitor the ACDF at C4-C5.    Today: He reports no major pain in the neck. He reports some pain and numbness in the right shoulder at times, but not too bad at this point.    Denies other new or changed complaints outside of his lower back complaints previously reported.     reports that he quit smoking about 28 years ago. His smoking use included cigarettes. He started smoking about 52 years ago. He has a 24 pack-year smoking history. He has been exposed to tobacco smoke. He has never used smokeless tobacco.    Review of Systems   Musculoskeletal:  Positive for back pain. Negative for neck pain.   Neurological:  Negative for weakness and numbness.       Objective   Vitals:    01/02/25 1126   BP: 145/80   Pulse: 81        Physical Exam  Constitutional:       Appearance: Normal appearance. He is obese.   Neck:      Comments: \"Tight\" with looking down  Pulmonary:      Effort: Pulmonary effort is normal.   Musculoskeletal:         General: No tenderness.      Comments: Petty's negative bilaterally   Neurological:      General: No focal deficit present.      Mental Status: He is alert and oriented to person, place, and time.      Sensory: No sensory deficit.      Motor: No weakness.      Deep Tendon Reflexes: Reflexes normal.   Psychiatric:         Mood and Affect: Mood normal.         Behavior: Behavior normal.          Result Review   I have personally interpreted the x-ray of cervical spine from 12/30/2024 which shows stable " ACDF at C4-C5.     Assessment and Plan {CC Problem List  Visit Diagnosis  ROS  Review (Popup)  [facility] Maintenance  Quality  BestPractice  Medications  SmartSets  SnapShot Encounters  Media :23}   Diagnoses and all orders for this visit:    1. Herniated nucleus pulposus, C4-5 (Primary)    2. Status post cervical spinal fusion    He is doing well as far as his neck goes.    The ACDF at C4-C5 is stable.    He will monitor for new or worsening complaints and notify us of change.    He will follow-up here PRN.  Follow Up {Instructions Charge Capture  Follow-up Communications :23}   Return if symptoms worsen or fail to improve.

## 2025-01-13 ENCOUNTER — LAB (OUTPATIENT)
Dept: LAB | Facility: HOSPITAL | Age: 75
End: 2025-01-13
Payer: MEDICARE

## 2025-01-13 DIAGNOSIS — E11.65 TYPE 2 DIABETES MELLITUS WITH HYPERGLYCEMIA, WITH LONG-TERM CURRENT USE OF INSULIN: ICD-10-CM

## 2025-01-13 DIAGNOSIS — Z79.4 TYPE 2 DIABETES MELLITUS WITH HYPERGLYCEMIA, WITH LONG-TERM CURRENT USE OF INSULIN: ICD-10-CM

## 2025-01-13 DIAGNOSIS — I10 PRIMARY HYPERTENSION: ICD-10-CM

## 2025-01-13 DIAGNOSIS — E78.2 MIXED HYPERLIPIDEMIA: ICD-10-CM

## 2025-01-13 LAB
ALBUMIN SERPL-MCNC: 3.7 G/DL (ref 3.5–5.2)
ALBUMIN/GLOB SERPL: 1 G/DL
ALP SERPL-CCNC: 67 U/L (ref 39–117)
ALT SERPL W P-5'-P-CCNC: 15 U/L (ref 1–41)
ANION GAP SERPL CALCULATED.3IONS-SCNC: 11.8 MMOL/L (ref 5–15)
AST SERPL-CCNC: 20 U/L (ref 1–40)
BILIRUB SERPL-MCNC: 0.5 MG/DL (ref 0–1.2)
BUN SERPL-MCNC: 13 MG/DL (ref 8–23)
BUN/CREAT SERPL: 12.5 (ref 7–25)
CALCIUM SPEC-SCNC: 9.5 MG/DL (ref 8.6–10.5)
CHLORIDE SERPL-SCNC: 97 MMOL/L (ref 98–107)
CHOLEST SERPL-MCNC: 138 MG/DL (ref 0–200)
CO2 SERPL-SCNC: 27.2 MMOL/L (ref 22–29)
CREAT SERPL-MCNC: 1.04 MG/DL (ref 0.76–1.27)
EGFRCR SERPLBLD CKD-EPI 2021: 76.3 ML/MIN/1.73
GLOBULIN UR ELPH-MCNC: 3.6 GM/DL
GLUCOSE SERPL-MCNC: 122 MG/DL (ref 65–99)
HBA1C MFR BLD: 6.1 % (ref 4.8–5.6)
HDLC SERPL-MCNC: 35 MG/DL (ref 40–60)
LDLC SERPL CALC-MCNC: 77 MG/DL (ref 0–100)
LDLC/HDLC SERPL: 2.09 {RATIO}
POTASSIUM SERPL-SCNC: 4.5 MMOL/L (ref 3.5–5.2)
PROT SERPL-MCNC: 7.3 G/DL (ref 6–8.5)
SODIUM SERPL-SCNC: 136 MMOL/L (ref 136–145)
TRIGL SERPL-MCNC: 150 MG/DL (ref 0–150)
VLDLC SERPL-MCNC: 26 MG/DL (ref 5–40)

## 2025-01-13 PROCEDURE — 83036 HEMOGLOBIN GLYCOSYLATED A1C: CPT

## 2025-01-13 PROCEDURE — 80061 LIPID PANEL: CPT

## 2025-01-13 PROCEDURE — 80053 COMPREHEN METABOLIC PANEL: CPT

## 2025-01-13 PROCEDURE — 36415 COLL VENOUS BLD VENIPUNCTURE: CPT

## 2025-01-15 ENCOUNTER — OFFICE VISIT (OUTPATIENT)
Dept: INTERNAL MEDICINE | Age: 75
End: 2025-01-15
Payer: MEDICARE

## 2025-01-15 VITALS
TEMPERATURE: 98.4 F | DIASTOLIC BLOOD PRESSURE: 64 MMHG | HEIGHT: 66 IN | HEART RATE: 74 BPM | BODY MASS INDEX: 30.12 KG/M2 | SYSTOLIC BLOOD PRESSURE: 120 MMHG | WEIGHT: 187.4 LBS | OXYGEN SATURATION: 98 %

## 2025-01-15 DIAGNOSIS — E11.65 TYPE 2 DIABETES MELLITUS WITH HYPERGLYCEMIA, WITH LONG-TERM CURRENT USE OF INSULIN: Primary | ICD-10-CM

## 2025-01-15 DIAGNOSIS — G63 POLYNEUROPATHY ASSOCIATED WITH UNDERLYING DISEASE: ICD-10-CM

## 2025-01-15 DIAGNOSIS — Z79.4 TYPE 2 DIABETES MELLITUS WITH HYPERGLYCEMIA, WITH LONG-TERM CURRENT USE OF INSULIN: Primary | ICD-10-CM

## 2025-01-15 DIAGNOSIS — I10 PRIMARY HYPERTENSION: ICD-10-CM

## 2025-01-15 DIAGNOSIS — E78.2 MIXED HYPERLIPIDEMIA: ICD-10-CM

## 2025-01-15 PROCEDURE — 99214 OFFICE O/P EST MOD 30 MIN: CPT | Performed by: INTERNAL MEDICINE

## 2025-01-15 PROCEDURE — 3078F DIAST BP <80 MM HG: CPT | Performed by: INTERNAL MEDICINE

## 2025-01-15 PROCEDURE — 1159F MED LIST DOCD IN RCRD: CPT | Performed by: INTERNAL MEDICINE

## 2025-01-15 PROCEDURE — G2211 COMPLEX E/M VISIT ADD ON: HCPCS | Performed by: INTERNAL MEDICINE

## 2025-01-15 PROCEDURE — 1160F RVW MEDS BY RX/DR IN RCRD: CPT | Performed by: INTERNAL MEDICINE

## 2025-01-15 PROCEDURE — 3074F SYST BP LT 130 MM HG: CPT | Performed by: INTERNAL MEDICINE

## 2025-01-15 PROCEDURE — 1125F AMNT PAIN NOTED PAIN PRSNT: CPT | Performed by: INTERNAL MEDICINE

## 2025-01-15 PROCEDURE — 3044F HG A1C LEVEL LT 7.0%: CPT | Performed by: INTERNAL MEDICINE

## 2025-01-15 RX ORDER — BLOOD-GLUCOSE METER
KIT MISCELLANEOUS
COMMUNITY
Start: 2024-11-05

## 2025-01-15 NOTE — ASSESSMENT & PLAN NOTE
Patient blood pressure is stable and his pulse is in the mid 70s as of his 1/25 OV.  He is on low-dose lisinopril/HCT as well as moderate dose atenolol, stable to continue same.

## 2025-01-15 NOTE — PROGRESS NOTES
"Chief Complaint  Diabetes and Follow-up (Pt had labs, he states this routine, he has no new issues. /His neuropathy is still bothering him. )    Subjective          Norberto Whiteside presents to [facility] INTER[facility] MED[facility]     History of present illness:  Patient 73-year-old male with underlying diabetes mellitus, hypertension, hyperlipidemia, resultant coronary artery disease with prior stent, who is coming in 1/25 for routine 4-month follow-up.  We will review his labs and make further recommendations at that time.    ---> Patient seen for hospital follow-up 1/24, underwent ACDF for C4-5 spinal stenosis per Dr. Michael.    Review of Systems   Constitutional:  Negative for appetite change, fatigue and fever.   HENT:  Negative for congestion and ear pain.    Eyes:  Negative for blurred vision.   Respiratory:  Negative for cough, chest tightness, shortness of breath and wheezing.    Cardiovascular:  Negative for chest pain, palpitations and leg swelling.   Gastrointestinal:  Negative for abdominal pain.   Genitourinary:  Negative for difficulty urinating, dysuria and hematuria.   Musculoskeletal:  Negative for arthralgias and gait problem.   Skin:  Negative for skin lesions.   Neurological:  Negative for syncope, memory problem and confusion.   Psychiatric/Behavioral:  Negative for self-injury and depressed mood.        Objective   Vital Signs:   /64   Pulse 74   Temp 98.4 °F (36.9 °C) (Skin)   Ht 167.6 cm (65.98\")   Wt 85 kg (187 lb 6.4 oz)   SpO2 98%   BMI 30.26 kg/m²           Physical Exam  Vitals and nursing note reviewed.   Constitutional:       General: He is not in acute distress.     Appearance: Normal appearance. He is not toxic-appearing.   HENT:      Head: Atraumatic.      Right Ear: External ear normal.      Left Ear: External ear normal.      Nose: Nose normal.      Mouth/Throat:      Mouth: Mucous membranes are moist.   Eyes:      General:         Right eye: No discharge.    "      Left eye: No discharge.      Extraocular Movements: Extraocular movements intact.      Pupils: Pupils are equal, round, and reactive to light.   Cardiovascular:      Rate and Rhythm: Normal rate and regular rhythm.      Pulses: Normal pulses.      Heart sounds: Normal heart sounds. No murmur heard.     No gallop.   Pulmonary:      Effort: Pulmonary effort is normal. No respiratory distress.      Breath sounds: No wheezing, rhonchi or rales.   Abdominal:      General: There is no distension.      Palpations: Abdomen is soft. There is no mass.      Tenderness: There is no abdominal tenderness. There is no guarding.   Musculoskeletal:         General: No swelling or tenderness.      Cervical back: No tenderness.      Right lower leg: No edema.      Left lower leg: No edema.   Skin:     General: Skin is warm and dry.      Findings: No rash.   Neurological:      General: No focal deficit present.      Mental Status: He is alert and oriented to person, place, and time. Mental status is at baseline.      Motor: No weakness.      Gait: Gait normal.   Psychiatric:         Mood and Affect: Mood normal.         Thought Content: Thought content normal.          Result Review :   The following data was reviewed by: Moreno Pereira MD on 10/05/2021:                  Assessment and Plan    Diagnoses and all orders for this visit:    1. Type 2 diabetes mellitus with hyperglycemia, with long-term current use of insulin (Primary)  Assessment & Plan:  A1c is back down from 7.7 to 6.1 as of his 1/25 OV.  He was able to get back on moderate dose Trulicity, and he is tolerating this.  He is having some issues getting it from [place], but I think that is reasonable, he will let us know if it becomes totally unavailable.    He is also on full doses of metformin and Jardiance, will continue same.  He is also on low-dose Lantus, 16 units, rarely has sugars in the 70s, typically is in the 100-140 ballpark.  He knows to back off some of  it does trend down any lower.    Orders:  -     Hemoglobin A1c; Future    2. Primary hypertension  Assessment & Plan:  Patient blood pressure is stable and his pulse is in the mid 70s as of his 1/25 OV.  He is on low-dose lisinopril/HCT as well as moderate dose atenolol, stable to continue same.    Orders:  -     Basic Metabolic Panel; Future    3. Mixed hyperlipidemia  Assessment & Plan:   Patient's LDL is 77 as of his 1/25 OV.  He is on moderate dose atorvastatin and stable to continue same.      4. Polyneuropathy associated with underlying disease  Overview:  Tried anodyne, benefit was short-lived, and cost was prohibitive for home machine given the short duration of benefit.    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 Clinically he has severe diabetic neuropathy involving the upper and lower extremities. I discussed with him and his daughter that he has a separate problem with cervical spinal stenosis and lumbar spine spinal stenosis which are separate from his diagnosis of diabetic polyneuropathy. I discussed with him that Dr. Pereira may try him on Cymbalta on top of his Lyrica. Thank you for letting me participate in his care.     Assessment & Plan:  This is his main issue still as of his 1/25 OV.  He is maxed out on Lyrica and Cymbalta and still having significant issues.  As noted above he did not benefit significantly from anodyne therapy.    Certainly seems appropriate to evaluate for medical marijuana/etc.            Total Time Spent:  minutes   This time includes time spent by me in the following activities: preparing for the visit, reviewing extensive past medical history and tests, performing a medically appropriate examination and/or evaluation, counseling and educating the patient and/or caregivers, ordering medications, tests, or procedures,  referring and/or communicating with other health care professionals and documenting information in the medical record all on this date of service.         --  --  OLDER NOTES:  VISIT 6/21:  ANNUAL MEDICARE WELLNESS PHYSICAL 9/17 = reviewed all forms with pt in office; no new concerns raised.  DM = A1C as below and OPTHO=20/20 and saw them in spring '18.  --  DM 2 ('15) and was started on Tanzem 3 mo ago and low dose amaryl was stopped; needs repeat labs, but FBS still in 280 ballpark; last A1C=9.9; will increase Tanzem before add another agent...down 10.8 to 8.5 past 4 months, so no invokana yet...7.6 is great trend, so no changes 1/18...8.5 again so needs jardiance/etc now since this is despite wt down some at 5/18 OV; also, may lose tanzem he tells me...8.2 is w/o any new agents, wt is still trending down, so will wait until after new year to add another if still in the 8's...8.4 and he will find out which one is covered...8.3 and got on Jardiance, so need to max it out as of 5/19 OV...7.9 is ok for now at least...8.1 and he blames it on his diet around holiday time; maxed out on 3 meds; will use lantus if same on RTO...8.3 and I d/w he needs Lantus now=10U qd and titrate...7.4 is nice drop already 9/20---> 7.1 is better 6/21.  (Micro-alb neg 5/20)  --  CAD s/p PTCA '96 with need for SPECT soonish=been too long it sound like; no sxs at least---> needs eval as of 6/21 for dizzy/weak/feels off;   LIPIDS with LDL 14 and TG's 400, ? lab error; will repeat prior to changes...LDL 43 with TG's < 200 is fine...LDL 37/TG's 300 baseline 12/19...LDL 27, so will lower dose now to 40 mg---> 60 is better 1/21  --  HTN remains well controlled and ok to lower to 1/2 tab ACEI/HCT if stays low at home.  ?CKD3 = 52% and will get on RTO in '19... >60%... 40% out of the blue 9/19---> 55% holding 6/21.  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  NEUROPATHY is worse 5/20 and I d/w anodyne is an option; worse when active; will use PRN ultram.  OBESITY with BMI 37 ballpark (TSH neg 5/18).  --  --  PSA 0.8 (5/6/2024)   COLON 1/22 = 2 polyps = 5 yrs per Dr Meade.  Pneumovax x1 ; Prevnar 9/17.  ( 10/21 after 32 years of marriage and she was 10 yrs older = 81 when she passeed Sun had severe dementia at the end; retired  and then  and retired '14, 1 girl in town)    Follow Up   Return in about 4 months (around 5/15/2025).  Patient was given instructions and counseling regarding his condition or for health maintenance advice. Please see specific information pulled into the AVS if appropriate.

## 2025-01-15 NOTE — ASSESSMENT & PLAN NOTE
This is his main issue still as of his 1/25 OV.  He is maxed out on Lyrica and Cymbalta and still having significant issues.  As noted above he did not benefit significantly from anodyne therapy.    Certainly seems appropriate to evaluate for medical marijuana/etc.

## 2025-01-15 NOTE — ASSESSMENT & PLAN NOTE
A1c is back down from 7.7 to 6.1 as of his 1/25 OV.  He was able to get back on moderate dose Trulicity, and he is tolerating this.  He is having some issues getting it from [place], but I think that is reasonable, he will let us know if it becomes totally unavailable.    He is also on full doses of metformin and Jardiance, will continue same.  He is also on low-dose Lantus, 16 units, rarely has sugars in the 70s, typically is in the 100-140 ballpark.  He knows to back off some of it does trend down any lower.

## 2025-01-15 NOTE — ASSESSMENT & PLAN NOTE
Patient's LDL is 77 as of his 1/25 OV.  He is on moderate dose atorvastatin and stable to continue same.

## 2025-01-27 ENCOUNTER — TELEPHONE (OUTPATIENT)
Dept: INTERNAL MEDICINE | Age: 75
End: 2025-01-27
Payer: MEDICARE

## 2025-01-27 NOTE — TELEPHONE ENCOUNTER
Caller: [facility]    Relationship to patient:     Best call back number: [phone]    Patient is needing: DEL WITH [facility] CALLED WANTING TO KNOW IF THE OFFICE HAD RECEIVED THE FAX SHE SENT FOR A NEUROLOGY SCREENING FOR THE PATIENT.

## 2025-02-25 NOTE — TELEPHONE ENCOUNTER
Caller: Norberto Whiteside    Relationship: Self    Best call back number: [phone]    Requested Prescriptions:   Requested Prescriptions     Pending Prescriptions Disp Refills    DULoxetine (CYMBALTA) 60 MG capsule 90 capsule 1     Sig: Take 1 capsule by mouth Daily.        Pharmacy where request should be sent: [place] [phone] [facility] [phone]      Last office visit with prescribing clinician: 1/15/2025   Last telemedicine visit with prescribing clinician: Visit date not found   Next office visit with prescribing clinician: 5/14/2025     Additional details provided by patient:     Does the patient have less than a 3 day supply:  [] Yes  [x] No        Roni Soto Rep   02/25/25 10:48 EST

## 2025-02-27 RX ORDER — DULOXETIN HYDROCHLORIDE 60 MG/1
60 CAPSULE, DELAYED RELEASE ORAL DAILY
Qty: 90 CAPSULE | Refills: 1 | Status: SHIPPED | OUTPATIENT
Start: 2025-02-27

## 2025-03-05 ENCOUNTER — OFFICE VISIT (OUTPATIENT)
Dept: PODIATRY | Facility: CLINIC | Age: 75
End: 2025-03-05
Payer: MEDICARE

## 2025-03-05 VITALS
OXYGEN SATURATION: 94 % | SYSTOLIC BLOOD PRESSURE: 112 MMHG | DIASTOLIC BLOOD PRESSURE: 75 MMHG | HEART RATE: 78 BPM | WEIGHT: 187 LBS | BODY MASS INDEX: 30.05 KG/M2 | HEIGHT: 66 IN

## 2025-03-05 DIAGNOSIS — M79.672 FOOT PAIN, BILATERAL: ICD-10-CM

## 2025-03-05 DIAGNOSIS — L60.0 ONYCHOCRYPTOSIS: Primary | ICD-10-CM

## 2025-03-05 DIAGNOSIS — E11.8 DM FEET: ICD-10-CM

## 2025-03-05 DIAGNOSIS — M79.671 FOOT PAIN, BILATERAL: ICD-10-CM

## 2025-03-05 DIAGNOSIS — B35.1 ONYCHOMYCOSIS: ICD-10-CM

## 2025-03-05 DIAGNOSIS — Z79.4 TYPE 2 DIABETES MELLITUS WITH DIABETIC POLYNEUROPATHY, WITH LONG-TERM CURRENT USE OF INSULIN: ICD-10-CM

## 2025-03-05 DIAGNOSIS — E11.42 TYPE 2 DIABETES MELLITUS WITH DIABETIC POLYNEUROPATHY, WITH LONG-TERM CURRENT USE OF INSULIN: ICD-10-CM

## 2025-03-05 DIAGNOSIS — G62.9 NEUROPATHY: ICD-10-CM

## 2025-03-05 NOTE — PROGRESS NOTES
[facility]    Today's Date: 03/05/25    Patient Name: Norberto Whiteside  MRN: [id number]  CSN: [id number]  PCP: Moreno Pereira MD, Last PCP Visit: 1/15/2025  Referring Provider: No ref. provider found    SUBJECTIVE     Chief Complaint   Patient presents with    Left Foot - Follow-up, Nail Problem         Right Foot - Follow-up, Nail Problem     HPI: Norberto Whiteside, a 74 y.o.male, comes to clinic.    New, Established, New Problem:  est    Location:  Toenails    Duration:   Greater than five years    Onset:  Gradual    Nature:  sore with palpation.    Stable, worsening, improving:   Stable    Aggravating factors:  Pain with shoe gear and ambulation.    Previous Treatment:  debridement    Patient states their most recent blood glucose reading was  135    Medical changes:  no changes    Patient denies any fevers, chills, nausea, vomiting, shortness of breathe, nor any other constitutional signs nor symptoms.       I have reviewed/confirmed previously documented HPI with no changes.       Past Medical History:   Diagnosis Date    Acid reflux     ADHD (attention deficit hyperactivity disorder)     Allergic 1990    Allergies     seasonal    Anxiety     Arthritis 2014    In lower back, upper neck and shoulders    Callus     Cancer     skin cancer    Cataract     Had surgery to have them removed    COPD (chronic obstructive pulmonary disease)     Depression     Diabetes mellitus     ave -140    Difficulty walking 2014    Balancing difficulty    Erectile dysfunction 2010    Glaucoma     Hard of hearing     Headache     Hyperlipidemia     Hypertension     Low back pain     MI (myocardial infarction) 1996    follows with PCP    Neck pain     Neuropathy     legs, feet, arms, hands, shoulders    Neuropathy in diabetes     Obesity     Sciatica     Skin cancer     Sleep apnea     Tremor 2022    Vision loss 1962     Past Surgical History:   Procedure Laterality Date    ANGIOPLASTY  1996    no stents     ANTERIOR CERVICAL DISCECTOMY W/ FUSION Right 01/10/2024    Procedure: ANTERIOR CERVICAL DISCECTOMY AND FUSION USING ALLOGRAFT BONE AND INSTRUMENTATION, right approach, 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    COLONOSCOPY N/A 2022    Procedure: COLONOSCOPY with polypectomies;  Surgeon: Kanu Meade MD;  Location: [facility] ENDOSCOPY;  Service: General;  Laterality: N/A;  colon polyps    EYE SURGERY      SPINE SURGERY       Family History   Problem Relation Age of Onset    Stroke Mother         Stroke around     Cancer Mother         Lung cancer    Cancer Father         Melanoma    Arthritis Father         Mild    Hearing loss Father         Hearing loss due to age    Vision loss Father         Cataract surgery both eyes    Cancer Sister         Breast cancer/Melanoma    Neuropathy Brother     Vision loss Brother         Cataract surgery both eyes    Cancer Sister         Melanoma    Malig Hyperthermia Neg Hx      Social History     Socioeconomic History    Marital status:    Tobacco Use    Smoking status: Former     Current packs/day: 0.00     Average packs/day: 1 pack/day for 24.0 years (24.0 ttl pk-yrs)     Types: Cigarettes     Start date: 1972     Quit date: 3/1/1996     Years since quittin.0     Passive exposure: Past    Smokeless tobacco: Never   Vaping Use    Vaping status: Never Used   Substance and Sexual Activity    Alcohol use: Yes     Alcohol/week: 2.0 standard drinks of alcohol     Types: 2 Glasses of wine per week     Comment: rare    Drug use: Not Currently     Frequency: 3.0 times per week     Types: Marijuana     Comment: Helps relieve neuropathy pain in feet and arthritis pain in    Sexual activity: Not Currently     Partners: Female     Birth control/protection: None      Comment: ED     No Known Allergies  Current Outpatient Medications   Medication Sig Dispense Refill    aspirin 81 MG EC tablet Take 1 tablet by mouth Daily. 90 tablet 3    atenolol (TENORMIN) 50 MG tablet Take 1 tablet by mouth Every Night. 90 tablet 3    atorvastatin (LIPITOR) 40 MG tablet Take 1 tablet by mouth Every Night. 90 tablet 3    bisacodyl (DULCOLAX) 10 MG suppository Insert 1 suppository into the rectum Daily As Needed for Constipation. 15 each 1    Blood Glucose Monitoring Suppl (FreeStyle Freedom Lite) w/Device kit       docusate sodium (COLACE) 100 MG capsule Take 1 capsule by mouth 3 (Three) Times a Day. 270 capsule 3    Dulaglutide (Trulicity) 3 MG/0.5ML solution pen-injector Inject 0.5 mL under the skin into the appropriate area as directed 1 (One) Time Per Week. On Sundays 6 mL 1    DULoxetine (CYMBALTA) 30 MG capsule Take 1 capsule daily with the 60 mg duloxetine. 90 capsule 1    DULoxetine (CYMBALTA) 60 MG capsule Take 1 capsule by mouth Daily. 90 capsule 1    empagliflozin (Jardiance) 25 MG tablet tablet Take 1 tablet by mouth Daily. 90 tablet 3    fluorouracil (EFUDEX) 5 % cream Apply 1 Application topically to the appropriate area as directed Daily As Needed.      Ibuprofen 3 %, Gabapentin 10 %, Baclofen 2 %, lidocaine 4 %, Ketamine HCl 4 % Apply 1-2 g topically to the appropriate area as directed 3 (Three) to 4 (Four) times daily. 90 g 5    ipratropium (ATROVENT) 0.06 % nasal spray 2 sprays into the nostril(s) as directed by provider 4 (Four) Times a Day. 15 mL 3    Lancets misc Use 1 each 2 (Two) Times a Day. 200 each 5    lisinopril-hydrochlorothiazide (PRINZIDE,ZESTORETIC) 10-12.5 MG per tablet Take 1 tablet by mouth Every Night. 90 tablet 3    loratadine (CLARITIN) 10 MG tablet Take 1 tablet by mouth Daily. 90 tablet 3    metFORMIN (GLUCOPHAGE) 1000 MG tablet Take 1 tablet by mouth 2 (Two) Times a Day With Meals. 180 tablet 3    multivitamin (MULTIVITAMIN PO) Take 1 tablet by mouth  Daily.      pantoprazole (PROTONIX) 40 MG EC tablet Take 1 tablet by mouth Daily. 90 tablet 3    pregabalin (Lyrica) 200 MG capsule Take 1 capsule by mouth 2 (Two) Times a Day. 180 capsule 1    Insulin Glargine (LANTUS SOLOSTAR) 100 UNIT/ML injection pen Inject 15 Units under the skin into the appropriate area as directed Daily for 200 days. 3 mL 3     No current facility-administered medications for this visit.     Review of Systems   Constitutional: Negative.    Skin:         Painful toenails   Neurological:  Positive for numbness.   All other systems reviewed and are negative.      OBJECTIVE     Vitals:    03/05/25 0948   BP: 112/75   Pulse: 78   SpO2: 94%             Lab Results   Component Value Date    HGBA1C 6.10 (H) 01/13/2025       Lab Results   Component Value Date    GLUCOSE 122 (H) 01/13/2025    CALCIUM 9.5 01/13/2025     01/13/2025    K 4.5 01/13/2025    CO2 27.2 01/13/2025    CL 97 (L) 01/13/2025    BUN 13 01/13/2025    CREATININE 1.04 01/13/2025    EGFRIFNONA 69 01/14/2022    BCR 12.5 01/13/2025    ANIONGAP 11.8 01/13/2025       Patient seen in no apparent distress.      PHYSICAL EXAM:     Foot/Ankle Exam    GENERAL  Appearance:  elderly  Orientation:  AAOx3  Affect:  appropriate  Gait:  antalgic  Assistance:  cane use  Right shoe gear: casual shoe  Left shoe gear: casual shoe    VASCULAR     Right Foot Vascularity   Dorsalis pedis:  1+  Posterior tibial:  1+  Skin temperature:  cool  Edema grading:  None  CFT:  < 3 seconds  Pedal hair growth:  Present  Varicosities:  mild varicosities     Left Foot Vascularity   Dorsalis pedis:  1+  Posterior tibial:  1+  Skin temperature:  cool  Edema grading:  None  CFT:  < 3 seconds  Pedal hair growth:  Present  Varicosities:  mild varicosities     NEUROLOGIC     Right Foot Neurologic   Light touch sensation: diminished  Vibratory sensation: diminished  Hot/Cold sensation: diminished  Protective Sensation using [place] Monofilament:   Sites intact:  3  Sites tested: 10     Left Foot Neurologic   Light touch sensation: diminished  Vibratory sensation: diminished  Hot/Cold sensation:  diminished  Protective Sensation using [place]-Keiko Monofilament:   Sites intact: 3  Sites tested: 10    MUSCLE STRENGTH     Right Foot Muscle Strength   Foot dorsiflexion:  4-  Foot plantar flexion:  4-  Foot inversion:  4-  Foot eversion:  4-     Left Foot Muscle Strength   Foot dorsiflexion:  4-  Foot plantar flexion:  4-  Foot inversion:  4-  Foot eversion:  4-    RANGE OF MOTION     Right Foot Range of Motion   Foot and ankle ROM within normal limits       Left Foot Range of Motion   Foot and ankle ROM within normal limits      DERMATOLOGIC      Right Foot Dermatologic   Skin  Right foot skin is intact.   Nails  1.  Positive for elongated, onychomycosis, abnormal thickness, subungual debris and ingrown toenail.  2.  Positive for elongated, onychomycosis, abnormal thickness, subungual debris and ingrown toenail.  3.  Positive for elongated, onychomycosis, abnormal thickness, subungual debris and ingrown toenail.  4.  Positive for elongated, onychomycosis, abnormal thickness, subungual debris and ingrown toenail.  5.  Positive for elongated, onychomycosis, abnormal thickness, subungual debris and ingrown toenail.  Nails comment:  Toenails 1, 2, 3, 4, and 5     Left Foot Dermatologic   Skin  Left foot skin is intact.   Nails comment:  Toenails 1, 2, 3, 4, and 5  Nails  1.  Positive for elongated, onychomycosis, abnormal thickness, subungual debris and ingrown toenail.  2.  Positive for elongated, onychomycosis, abnormal thickness, subungual debris and ingrown toenail.  3.  Positive for elongated, onychomycosis, abnormal thickness, subungual debris and ingrown toenail.  4.  Positive for elongated, onychomycosis, abnormally thick, subungual debris and ingrown toenail.  5.  Positive for elongated, onychomycosis, abnormally thick, subungual debris and ingrown toenail.    I have  reexamined the patient the results are consistent with the previously documented exam.    ASSESSMENT/PLAN     Diagnoses and all orders for this visit:    1. Onychocryptosis (Primary)    2. Onychomycosis    3. Neuropathy    4. Type 2 diabetes mellitus with diabetic polyneuropathy, with long-term current use of insulin    5. Foot pain, bilateral    6. DM feet    Comprehensive lower extremity examination and evaluation was performed.    Discussed findings and treatment plan including risks, benefits, and treatment options with patient in detail. Patient agreed with treatment plan.    Toenails 1 through 5 bilaterally were debrided in thickness and length and then smoothed with a Dremel Tool.  Tolerated the procedure well without complications.    An After Visit Summary was printed and given to the patient at discharge, including (if requested) any available informative/educational handouts regarding diagnosis, treatment, or medications. All questions were answered to patient/family satisfaction. Should symptoms fail to improve or worsen they agree to call or return to clinic or to go to the Emergency Department. Discussed the importance of following up with any needed screening tests/labs/specialist appointments and any requested follow-up recommended by me today. Importance of maintaining follow-up discussed and patient accepts that missed appointments can delay diagnosis and potentially lead to worsening of conditions.    Return in about 9 weeks (around 5/7/2025) for Toenail Care., or sooner if acute issues arise.    I have reviewed the assessment and plan and verified the accuracy of it. No changes to assessment and plan since the information was documented. Wayne Foster DPM 03/05/25     I have dictated this note utilizing Dragon Dictation.  Please note that portions of this note were completed with a voice recognition program.  Part of this note may be an electronic transcription/translation of spoken  language to printed text using the Dragon Dictation System.      This document has been electronically signed by Wayne Foster DPM on March 5, 2025 09:58 EST

## 2025-03-27 NOTE — TELEPHONE ENCOUNTER
Caller: Norberto Whiteside    Relationship: Self    Best call back number: [phone]    Requested Prescriptions:   Requested Prescriptions     Pending Prescriptions Disp Refills    DULoxetine (CYMBALTA) 30 MG capsule 90 capsule 1     Sig: Take 1 capsule daily with the 60 mg duloxetine.    Dulaglutide (Trulicity) 3 MG/0.5ML solution auto-injector 6 mL 1     Sig: Inject 3 mg under the skin into the appropriate area as directed 1 (One) Time Per Week. On Sundays        Pharmacy where request should be sent: [place] [phone] [place], [place] [phone]      Last office visit with prescribing clinician: 1/15/2025   Last telemedicine visit with prescribing clinician: Visit date not found   Next office visit with prescribing clinician: 5/14/2025     Additional details provided by patient:     Does the patient have less than a 3 day supply:  [x] Yes  [] No      Roni Soto Rep   03/27/25 09:31 EDT

## 2025-03-28 RX ORDER — DULOXETIN HYDROCHLORIDE 30 MG/1
CAPSULE, DELAYED RELEASE ORAL
Qty: 90 CAPSULE | Refills: 1 | Status: SHIPPED | OUTPATIENT
Start: 2025-03-28

## 2025-04-23 ENCOUNTER — TELEPHONE (OUTPATIENT)
Age: 75
End: 2025-04-23
Payer: MEDICARE

## 2025-04-23 RX ORDER — DULOXETIN HYDROCHLORIDE 30 MG/1
CAPSULE, DELAYED RELEASE ORAL
Qty: 90 CAPSULE | Refills: 1 | Status: SHIPPED | OUTPATIENT
Start: 2025-04-23

## 2025-04-23 RX ORDER — PEN NEEDLE, DIABETIC 30 GX3/16"
1 NEEDLE, DISPOSABLE MISCELLANEOUS DAILY
Qty: 100 EACH | Refills: 1 | Status: SHIPPED | OUTPATIENT
Start: 2025-04-23

## 2025-04-23 NOTE — TELEPHONE ENCOUNTER
Caller: Norberto Whiteside    Relationship: Self    Best call back number: [phone]     Requested Prescriptions:     DULOXETINE - 30 MG - ONE DAILY     31 G X 3/16 OR 5 MM FOR LANTUS     Pharmacy where request should be sent: [facility] [place] - [address] [phone] [facility] [phone]      Last office visit with prescribing clinician: 1/15/2025   Last telemedicine visit with prescribing clinician: Visit date not found   Next office visit with prescribing clinician: 5/14/2025     Does the patient have less than a 3 day supply:  [] Yes  [x] No    Roni Blevins Rep   04/23/25 13:28 EDT

## 2025-05-12 ENCOUNTER — LAB (OUTPATIENT)
Dept: LAB | Facility: HOSPITAL | Age: 75
End: 2025-05-12
Payer: MEDICARE

## 2025-05-12 DIAGNOSIS — E11.65 TYPE 2 DIABETES MELLITUS WITH HYPERGLYCEMIA, WITH LONG-TERM CURRENT USE OF INSULIN: ICD-10-CM

## 2025-05-12 DIAGNOSIS — Z12.5 PROSTATE CANCER SCREENING: ICD-10-CM

## 2025-05-12 DIAGNOSIS — Z79.4 TYPE 2 DIABETES MELLITUS WITH HYPERGLYCEMIA, WITH LONG-TERM CURRENT USE OF INSULIN: ICD-10-CM

## 2025-05-12 DIAGNOSIS — Z51.81 ENCOUNTER FOR MEDICATION MONITORING: ICD-10-CM

## 2025-05-12 DIAGNOSIS — I10 PRIMARY HYPERTENSION: ICD-10-CM

## 2025-05-12 LAB
AMPHET+METHAMPHET UR QL: NEGATIVE
AMPHETAMINES UR QL: NEGATIVE
ANION GAP SERPL CALCULATED.3IONS-SCNC: 12.6 MMOL/L (ref 5–15)
BARBITURATES UR QL SCN: NEGATIVE
BENZODIAZ UR QL SCN: NEGATIVE
BUN SERPL-MCNC: 16 MG/DL (ref 8–23)
BUN/CREAT SERPL: 14.5 (ref 7–25)
BUPRENORPHINE SERPL-MCNC: NEGATIVE NG/ML
CALCIUM SPEC-SCNC: 10.1 MG/DL (ref 8.6–10.5)
CANNABINOIDS SERPL QL: POSITIVE
CHLORIDE SERPL-SCNC: 101 MMOL/L (ref 98–107)
CO2 SERPL-SCNC: 25.4 MMOL/L (ref 22–29)
COCAINE UR QL: NEGATIVE
CREAT SERPL-MCNC: 1.1 MG/DL (ref 0.76–1.27)
EGFRCR SERPLBLD CKD-EPI 2021: 70.9 ML/MIN/1.73
FENTANYL UR-MCNC: NEGATIVE NG/ML
GLUCOSE SERPL-MCNC: 137 MG/DL (ref 65–99)
HBA1C MFR BLD: 6.7 % (ref 4.8–5.6)
METHADONE UR QL SCN: NEGATIVE
OPIATES UR QL: NEGATIVE
OXYCODONE UR QL SCN: NEGATIVE
PCP UR QL SCN: NEGATIVE
POTASSIUM SERPL-SCNC: 5 MMOL/L (ref 3.5–5.2)
SODIUM SERPL-SCNC: 139 MMOL/L (ref 136–145)
TRICYCLICS UR QL SCN: NEGATIVE

## 2025-05-12 PROCEDURE — 83036 HEMOGLOBIN GLYCOSYLATED A1C: CPT

## 2025-05-12 PROCEDURE — 82043 UR ALBUMIN QUANTITATIVE: CPT

## 2025-05-12 PROCEDURE — 80307 DRUG TEST PRSMV CHEM ANLYZR: CPT

## 2025-05-12 PROCEDURE — 80048 BASIC METABOLIC PNL TOTAL CA: CPT

## 2025-05-12 PROCEDURE — G0103 PSA SCREENING: HCPCS

## 2025-05-12 PROCEDURE — 36415 COLL VENOUS BLD VENIPUNCTURE: CPT

## 2025-05-12 PROCEDURE — 82570 ASSAY OF URINE CREATININE: CPT

## 2025-05-14 ENCOUNTER — OFFICE VISIT (OUTPATIENT)
Age: 75
End: 2025-05-14
Payer: MEDICARE

## 2025-05-14 VITALS
SYSTOLIC BLOOD PRESSURE: 120 MMHG | OXYGEN SATURATION: 98 % | HEIGHT: 66 IN | DIASTOLIC BLOOD PRESSURE: 60 MMHG | WEIGHT: 186.4 LBS | HEART RATE: 74 BPM | BODY MASS INDEX: 29.96 KG/M2

## 2025-05-14 DIAGNOSIS — I10 PRIMARY HYPERTENSION: ICD-10-CM

## 2025-05-14 DIAGNOSIS — Z12.5 PROSTATE CANCER SCREENING: ICD-10-CM

## 2025-05-14 DIAGNOSIS — E03.8 SUBCLINICAL HYPOTHYROIDISM: ICD-10-CM

## 2025-05-14 DIAGNOSIS — G63 POLYNEUROPATHY ASSOCIATED WITH UNDERLYING DISEASE: ICD-10-CM

## 2025-05-14 DIAGNOSIS — E11.65 TYPE 2 DIABETES MELLITUS WITH HYPERGLYCEMIA, WITH LONG-TERM CURRENT USE OF INSULIN: Primary | ICD-10-CM

## 2025-05-14 DIAGNOSIS — E78.2 MIXED HYPERLIPIDEMIA: ICD-10-CM

## 2025-05-14 DIAGNOSIS — Z79.4 TYPE 2 DIABETES MELLITUS WITH HYPERGLYCEMIA, WITH LONG-TERM CURRENT USE OF INSULIN: Primary | ICD-10-CM

## 2025-05-14 LAB
ALBUMIN UR-MCNC: <1.2 MG/DL
CREAT UR-MCNC: 73.4 MG/DL
MICROALBUMIN/CREAT UR: NORMAL MG/G{CREAT}
PSA SERPL-MCNC: 0.83 NG/ML (ref 0–4)

## 2025-05-14 RX ORDER — BISACODYL 10 MG
10 SUPPOSITORY, RECTAL RECTAL DAILY PRN
Qty: 15 EACH | Refills: 1 | Status: SHIPPED | OUTPATIENT
Start: 2025-05-14

## 2025-05-14 RX ORDER — DOCUSATE SODIUM 100 MG/1
100 CAPSULE, LIQUID FILLED ORAL 3 TIMES DAILY
Qty: 270 CAPSULE | Refills: 3 | Status: SHIPPED | OUTPATIENT
Start: 2025-05-14

## 2025-05-14 RX ORDER — PANTOPRAZOLE SODIUM 40 MG/1
40 TABLET, DELAYED RELEASE ORAL DAILY
Qty: 90 TABLET | Refills: 3 | Status: SHIPPED | OUTPATIENT
Start: 2025-05-14

## 2025-05-14 RX ORDER — ATENOLOL 50 MG/1
50 TABLET ORAL NIGHTLY
Qty: 90 TABLET | Refills: 3 | Status: SHIPPED | OUTPATIENT
Start: 2025-05-14

## 2025-05-14 RX ORDER — PREGABALIN 200 MG/1
200 CAPSULE ORAL 2 TIMES DAILY
Qty: 180 CAPSULE | Refills: 1 | Status: SHIPPED | OUTPATIENT
Start: 2025-05-14

## 2025-05-14 RX ORDER — ASPIRIN 81 MG/1
81 TABLET ORAL DAILY
Qty: 90 TABLET | Refills: 3 | Status: SHIPPED | OUTPATIENT
Start: 2025-05-14

## 2025-05-14 RX ORDER — ATORVASTATIN CALCIUM 40 MG/1
40 TABLET, FILM COATED ORAL NIGHTLY
Qty: 90 TABLET | Refills: 3 | Status: SHIPPED | OUTPATIENT
Start: 2025-05-14

## 2025-05-14 RX ORDER — LORATADINE 10 MG/1
10 TABLET ORAL DAILY
Qty: 90 TABLET | Refills: 3 | Status: SHIPPED | OUTPATIENT
Start: 2025-05-14

## 2025-05-14 RX ORDER — LISINOPRIL AND HYDROCHLOROTHIAZIDE 10; 12.5 MG/1; MG/1
1 TABLET ORAL NIGHTLY
Qty: 90 TABLET | Refills: 3 | Status: SHIPPED | OUTPATIENT
Start: 2025-05-14

## 2025-05-14 NOTE — ASSESSMENT & PLAN NOTE
A1c was down from 7.7 to 6.1 we saw him earlier this year, he is at 6.7 as of his 5/25 OV, and discussed with him this is actually a better ballpark.  He monitors his sugars in the morning and he is aware to back off on Lantus if he is below 100 more often than not.    Otherwise he stable to continue with low-dose 16 units of Lantus as well as full doses of metformin, Jardiance, and Trulicity.

## 2025-05-14 NOTE — ASSESSMENT & PLAN NOTE
Patient blood pressure is stable and his pulse is in the mid 70s as of his 5/25 OV.  He is on low-dose lisinopril/HCT as well as moderate dose atenolol, stable to continue same.

## 2025-05-14 NOTE — PROGRESS NOTES
"Chief Complaint  Diabetes (Pt states that Trulicity keeps his stomach upset, but he is starting to get use to it. ) and Follow-up (Pt had labs, he states that this is routine, he has no new issues. )    Subjective          Norberto Whiteside presents to [facility]     History of present illness:  Patient 73-year-old male with underlying diabetes mellitus, hypertension, hyperlipidemia, resultant coronary artery disease with prior stent, who is coming in 5/25 for routine 4-month follow-up.  We will review his labs and make further recommendations at that time.    ---> Patient seen for hospital follow-up 1/24, underwent ACDF for C4-5 spinal stenosis per Dr. Michael.    Review of Systems   Constitutional:  Negative for appetite change, fatigue and fever.   HENT:  Negative for congestion and ear pain.    Eyes:  Negative for blurred vision.   Respiratory:  Negative for cough, chest tightness, shortness of breath and wheezing.    Cardiovascular:  Negative for chest pain, palpitations and leg swelling.   Gastrointestinal:  Negative for abdominal pain.   Genitourinary:  Negative for difficulty urinating, dysuria and hematuria.   Musculoskeletal:  Negative for arthralgias and gait problem.   Skin:  Negative for skin lesions.   Neurological:  Negative for syncope, memory problem and confusion.   Psychiatric/Behavioral:  Negative for self-injury and depressed mood.        Objective   Vital Signs:   /60   Pulse 74   Ht 167.6 cm (65.98\")   Wt 84.6 kg (186 lb 6.4 oz)   SpO2 98%   BMI 30.10 kg/m²           Physical Exam  Vitals and nursing note reviewed.   Constitutional:       General: He is not in acute distress.     Appearance: Normal appearance. He is not toxic-appearing.   HENT:      Head: Atraumatic.      Right Ear: External ear normal.      Left Ear: External ear normal.      Nose: Nose normal.      Mouth/Throat:      Mouth: Mucous membranes are moist.   Eyes:      General:         " Right eye: No discharge.         Left eye: No discharge.      Extraocular Movements: Extraocular movements intact.      Pupils: Pupils are equal, round, and reactive to light.   Cardiovascular:      Rate and Rhythm: Normal rate and regular rhythm.      Pulses: Normal pulses.      Heart sounds: Normal heart sounds. No murmur heard.     No gallop.   Pulmonary:      Effort: Pulmonary effort is normal. No respiratory distress.      Breath sounds: No wheezing, rhonchi or rales.   Abdominal:      General: There is no distension.      Palpations: Abdomen is soft. There is no mass.      Tenderness: There is no abdominal tenderness. There is no guarding.   Musculoskeletal:         General: No swelling or tenderness.      Cervical back: No tenderness.      Right lower leg: No edema.      Left lower leg: No edema.   Skin:     General: Skin is warm and dry.      Findings: No rash.   Neurological:      General: No focal deficit present.      Mental Status: He is alert and oriented to person, place, and time. Mental status is at baseline.      Motor: No weakness.      Gait: Gait normal.   Psychiatric:         Mood and Affect: Mood normal.         Thought Content: Thought content normal.          Result Review :   The following data was reviewed by: Moreno Pereira MD on 10/05/2021:                  Assessment and Plan    Diagnoses and all orders for this visit:    1. Type 2 diabetes mellitus with hyperglycemia, with long-term current use of insulin (Primary)  Assessment & Plan:  A1c was down from 7.7 to 6.1 we saw him earlier this year, he is at 6.7 as of his 5/25 OV, and discussed with him this is actually a better ballpark.  He monitors his sugars in the morning and he is aware to back off on Lantus if he is below 100 more often than not.    Otherwise he stable to continue with low-dose 16 units of Lantus as well as full doses of metformin, Jardiance, and Trulicity.    Orders:  -     Microalbumin / Creatinine Urine Ratio - Urine,  Clean Catch; Future  -     Hemoglobin A1c; Future    2. Polyneuropathy associated with underlying disease  Overview:  Tried anodyne, benefit was short-lived, and cost was prohibitive for home machine given the short duration of benefit.    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 Clinically he has severe diabetic neuropathy involving the upper and lower extremities. I discussed with him and his daughter that he has a separate problem with cervical spinal stenosis and lumbar spine spinal stenosis which are separate from his diagnosis of diabetic polyneuropathy. I discussed with him that Dr. Pereira may try him on Cymbalta on top of his Lyrica. Thank you for letting me participate in his care.     Orders:  -     pregabalin (Lyrica) 200 MG capsule; Take 1 capsule by mouth 2 (Two) Times a Day.  Dispense: 180 capsule; Refill: 1  -     CBC & Differential; Future    3. Mixed hyperlipidemia  -     Lipid panel; Future    4. Primary hypertension  Assessment & Plan:  Patient blood pressure is stable and his pulse is in the mid 70s as of his 5/25 OV.  He is on low-dose lisinopril/HCT as well as moderate dose atenolol, stable to continue same.    Orders:  -     Comprehensive metabolic panel; Future    5. Subclinical hypothyroidism  -     TSH+Free T4; Future    6. Prostate cancer screening  -     PSA SCREENING; Future    Other orders  -     aspirin 81 MG EC tablet; Take 1 tablet by mouth Daily.  Dispense: 90 tablet; Refill: 3  -     atenolol (TENORMIN) 50 MG tablet; Take 1 tablet by mouth Every Night.  Dispense: 90 tablet; Refill: 3  -     atorvastatin (LIPITOR) 40 MG tablet; Take 1 tablet by mouth Every Night.  Dispense: 90 tablet; Refill: 3  -     docusate sodium (COLACE) 100 MG capsule; Take 1 capsule by mouth 3 (Three) Times a Day.  Dispense: 270 capsule; Refill: 3  -      empagliflozin (Jardiance) 25 MG tablet tablet; Take 1 tablet by mouth Daily.  Dispense: 90 tablet; Refill: 3  -     bisacodyl (DULCOLAX) 10 MG suppository; Insert 1 suppository into the rectum Daily As Needed for Constipation.  Dispense: 15 each; Refill: 1  -     metFORMIN (GLUCOPHAGE) 1000 MG tablet; Take 1 tablet by mouth 2 (Two) Times a Day With Meals.  Dispense: 180 tablet; Refill: 3  -     lisinopril-hydrochlorothiazide (PRINZIDE,ZESTORETIC) 10-12.5 MG per tablet; Take 1 tablet by mouth Every Night.  Dispense: 90 tablet; Refill: 3  -     loratadine (CLARITIN) 10 MG tablet; Take 1 tablet by mouth Daily.  Dispense: 90 tablet; Refill: 3  -     pantoprazole (PROTONIX) 40 MG EC tablet; Take 1 tablet by mouth Daily.  Dispense: 90 tablet; Refill: 3  -     Insulin Glargine (LANTUS SOLOSTAR) 100 UNIT/ML injection pen; Inject 16 Units under the skin into the appropriate area as directed Daily for 200 days.  Dispense: 15 mL; Refill: 3          Total Time Spent:  minutes   This time includes time spent by me in the following activities: preparing for the visit, reviewing extensive past medical history and tests, performing a medically appropriate examination and/or evaluation, counseling and educating the patient and/or caregivers, ordering medications, tests, or procedures, referring and/or communicating with other health care professionals and documenting information in the medical record all on this date of service.         --  --  OLDER NOTES:  VISIT 6/21:  ANNUAL MEDICARE WELLNESS PHYSICAL 9/17 = reviewed all forms with pt in office; no new concerns raised.  DM = A1C as below and OPTHO=20/20 and saw them in spring '18.  --  DM 2 ('15) and was started on Tanzem 3 mo ago and low dose amaryl was stopped; needs repeat labs, but FBS still in 280 ballpark; last A1C=9.9; will increase Tanzem before add another agent...down 10.8 to 8.5 past 4 months, so no invokana yet...7.6 is great trend, so no changes 1/18...8.5 again so  needs jardiance/etc now since this is despite wt down some at 5/18 OV; also, may lose tanzem he tells me...8.2 is w/o any new agents, wt is still trending down, so will wait until after new year to add another if still in the 8's...8.4 and he will find out which one is covered...8.3 and got on Jardiance, so need to max it out as of 5/19 OV...7.9 is ok for now at least...8.1 and he blames it on his diet around holiday time; maxed out on 3 meds; will use lantus if same on RTO...8.3 and I d/w he needs Lantus now=10U qd and titrate...7.4 is nice drop already 9/20---> 7.1 is better 6/21.  (Micro-alb neg 5/20)  --  CAD s/p PTCA '96 with need for SPECT soonish=been too long it sound like; no sxs at least---> needs eval as of 6/21 for dizzy/weak/feels off;   LIPIDS with LDL 14 and TG's 400, ? lab error; will repeat prior to changes...LDL 43 with TG's < 200 is fine...LDL 37/TG's 300 baseline 12/19...LDL 27, so will lower dose now to 40 mg---> 60 is better 1/21  --  HTN remains well controlled and ok to lower to 1/2 tab ACEI/HCT if stays low at home.  ?CKD3 = 52% and will get on RTO in '19... >60%... 40% out of the blue 9/19---> 55% holding 6/21.  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  NEUROPATHY is worse 5/20 and I d/w anodyne is an option; worse when active; will use PRN ultram.  OBESITY with BMI 37 ballpark (TSH neg 5/18).  --  --  PSA 0.8 (5/6/2024)   COLON 1/22 = 2 polyps = 5 yrs per Dr Meade.  Pneumovax x1 ; Prevnar 9/17.  ( 10/21 after 32 years of marriage and she was 10 yrs older = 81 when she passeed, Sun had severe dementia at the end; retired  and then  and retired '14, 1 girl in town:  M passed 60's lung ca, F passed 70's melanoma)    Follow Up   Return in about 4 months (around 9/14/2025).  Patient was given instructions and counseling regarding his condition or  for health maintenance advice. Please see specific information pulled into the AVS if appropriate.

## 2025-06-10 ENCOUNTER — OFFICE VISIT (OUTPATIENT)
Dept: PODIATRY | Facility: CLINIC | Age: 75
End: 2025-06-10
Payer: MEDICARE

## 2025-06-10 VITALS
OXYGEN SATURATION: 91 % | HEART RATE: 91 BPM | SYSTOLIC BLOOD PRESSURE: 109 MMHG | DIASTOLIC BLOOD PRESSURE: 65 MMHG | WEIGHT: 186 LBS | BODY MASS INDEX: 29.89 KG/M2 | TEMPERATURE: 96.9 F | HEIGHT: 66 IN

## 2025-06-10 DIAGNOSIS — M79.671 FOOT PAIN, BILATERAL: ICD-10-CM

## 2025-06-10 DIAGNOSIS — B35.1 ONYCHOMYCOSIS: ICD-10-CM

## 2025-06-10 DIAGNOSIS — E11.8 DM FEET: ICD-10-CM

## 2025-06-10 DIAGNOSIS — M79.672 FOOT PAIN, BILATERAL: ICD-10-CM

## 2025-06-10 DIAGNOSIS — E11.42 TYPE 2 DIABETES MELLITUS WITH DIABETIC POLYNEUROPATHY, WITH LONG-TERM CURRENT USE OF INSULIN: ICD-10-CM

## 2025-06-10 DIAGNOSIS — G62.9 NEUROPATHY: ICD-10-CM

## 2025-06-10 DIAGNOSIS — Z79.4 TYPE 2 DIABETES MELLITUS WITH DIABETIC POLYNEUROPATHY, WITH LONG-TERM CURRENT USE OF INSULIN: ICD-10-CM

## 2025-06-10 DIAGNOSIS — L60.0 ONYCHOCRYPTOSIS: Primary | ICD-10-CM

## 2025-06-10 NOTE — PROGRESS NOTES
[facility] - PODIATRY    Today's Date: 06/10/25    Patient Name: Norberto Whiteside  MRN: [id number]  CSN: [id number]  PCP: Moreno Pereira MD, Last PCP Visit: 5/14/2025  Referring Provider: No ref. provider found    SUBJECTIVE     Chief Complaint   Patient presents with    Left Foot - Follow-up, Nail Problem         Right Foot - Follow-up, Nail Problem     HPI: Norberto Whiteside, a 75 y.o.male, comes to clinic.    New, Established, New Problem:  est    Location:  Toenails    Duration:   Greater than five years    Onset:  Gradual    Nature:  sore with palpation.    Stable, worsening, improving:   Stable    Aggravating factors:  Pain with shoe gear and ambulation.    Previous Treatment:  debridement    Patient states their most recent blood glucose reading was 136    Medical changes:  none    Patient denies any fevers, chills, nausea, vomiting, shortness of breathe, nor any other constitutional signs nor symptoms.       I have reviewed/confirmed previously documented HPI with no changes.     Past Medical History:   Diagnosis Date    Acid reflux     ADHD (attention deficit hyperactivity disorder)     Allergic 1990    Allergies     seasonal    Anxiety     Arthritis 2014    In lower back, upper neck and shoulders    Callus     Cancer     skin cancer    Cataract     Had surgery to have them removed    Cervical disc disorder     COPD (chronic obstructive pulmonary disease)     Coronary artery disease     Depression     Diabetes mellitus     ave -140    Difficulty walking     Balancing difficulty    Erectile dysfunction 2010    Glaucoma     Hard of hearing     Headache     Hyperlipidemia     Hypertension     Low back pain     Lumbosacral disc disease 1970    Pain so bad initially, I  passed out.    MI (myocardial infarction) 1996    follows with PCP    Neck pain     Neuropathy     legs, feet, arms, hands, shoulders    Neuropathy in diabetes     Obesity     Sciatica     Skin cancer     Sleep apnea     Thoracic  disc disorder     Tremor     Vision loss 196     Past Surgical History:   Procedure Laterality Date    ANGIOPLASTY      no stents    ANTERIOR CERVICAL DISCECTOMY W/ FUSION Right 01/10/2024    Procedure: ANTERIOR CERVICAL DISCECTOMY AND FUSION USING ALLOGRAFT BONE AND INSTRUMENTATION, right approach, 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);  Surgeon: Konstantin Michael MD;  Location: [facility] MAIN OR;  Service: Neurosurgery;  Laterality: Right;    CARDIAC CATHETERIZATION      Tributary off the heart.    COLONOSCOPY N/A 2022    Procedure: COLONOSCOPY with polypectomies;  Surgeon: Kanu Meaed MD;  Location: [facility] ENDOSCOPY;  Service: General;  Laterality: N/A;  colon polyps    CORONARY ANGIOPLASTY      EYE SURGERY      NECK SURGERY      SPINE SURGERY       Family History   Problem Relation Age of Onset    Stroke Mother         Stroke around     Cancer Mother         Lung cancer    Cancer Father         Melanoma    Arthritis Father         Mild    Hearing loss Father         Hearing loss due to age    Vision loss Father         Cataract surgery both eyes    Cancer Sister         Breast cancer/Melanoma    Neuropathy Brother     Vision loss Brother         Cataract surgery both eyes    Cancer Sister         Melanoma    Malig Hyperthermia Neg Hx      Social History     Socioeconomic History    Marital status:    Tobacco Use    Smoking status: Former     Current packs/day: 0.00     Average packs/day: 1 pack/day for 24.0 years (24.0 ttl pk-yrs)     Types: Cigarettes     Start date: 1972     Quit date: 3/1/1996     Years since quittin.2     Passive exposure: Past    Smokeless tobacco: Never   Vaping Use    Vaping status: Never Used   Substance and Sexual Activity    Alcohol use: Yes     Alcohol/week: 2.0 standard drinks of alcohol     Types: 2 Glasses of wine per week     Comment: rare    Drug use: Not Currently     Frequency: 3.0 times per week      Types: Marijuana     Comment: Helps relieve neuropathy pain in feet and arthritis pain in    Sexual activity: Not Currently     Partners: Female     Birth control/protection: None     Comment: ED     No Known Allergies  Current Outpatient Medications   Medication Sig Dispense Refill    aspirin 81 MG EC tablet Take 1 tablet by mouth Daily. 90 tablet 3    atenolol (TENORMIN) 50 MG tablet Take 1 tablet by mouth Every Night. 90 tablet 3    atorvastatin (LIPITOR) 40 MG tablet Take 1 tablet by mouth Every Night. 90 tablet 3    bisacodyl (DULCOLAX) 10 MG suppository Insert 1 suppository into the rectum Daily As Needed for Constipation. 15 each 1    Blood Glucose Monitoring Suppl (FreeStyle Freedom Lite) w/Device kit       docusate sodium (COLACE) 100 MG capsule Take 1 capsule by mouth 3 (Three) Times a Day. 270 capsule 3    Dulaglutide (Trulicity) 3 MG/0.5ML solution auto-injector Inject 3 mg under the skin into the appropriate area as directed 1 (One) Time Per Week. On Sundays 6 mL 1    DULoxetine (CYMBALTA) 30 MG capsule Take 1 capsule daily with the 60 mg duloxetine. 90 capsule 1    DULoxetine (CYMBALTA) 60 MG capsule Take 1 capsule by mouth Daily. 90 capsule 1    empagliflozin (Jardiance) 25 MG tablet tablet Take 1 tablet by mouth Daily. 90 tablet 3    fluorouracil (EFUDEX) 5 % cream Apply 1 Application topically to the appropriate area as directed Daily As Needed.      Ibuprofen 3 %, Gabapentin 10 %, Baclofen 2 %, lidocaine 4 %, Ketamine HCl 4 % Apply 1-2 g topically to the appropriate area as directed 3 (Three) to 4 (Four) times daily. 90 g 5    Insulin Glargine (LANTUS SOLOSTAR) 100 UNIT/ML injection pen Inject 16 Units under the skin into the appropriate area as directed Daily for 200 days. 15 mL 3    Insulin Pen Needle (Pen Needles) 31G X 5 MM misc Use 1 each Daily. 100 each 1    ipratropium (ATROVENT) 0.06 % nasal spray 2 sprays into the nostril(s) as directed by provider 4 (Four) Times a Day. 15 mL 3     Lancets misc Use 1 each 2 (Two) Times a Day. 200 each 5    lisinopril-hydrochlorothiazide (PRINZIDE,ZESTORETIC) 10-12.5 MG per tablet Take 1 tablet by mouth Every Night. 90 tablet 3    loratadine (CLARITIN) 10 MG tablet Take 1 tablet by mouth Daily. 90 tablet 3    metFORMIN (GLUCOPHAGE) 1000 MG tablet Take 1 tablet by mouth 2 (Two) Times a Day With Meals. 180 tablet 3    multivitamin (MULTIVITAMIN PO) Take 1 tablet by mouth Daily.      pantoprazole (PROTONIX) 40 MG EC tablet Take 1 tablet by mouth Daily. 90 tablet 3    pregabalin (Lyrica) 200 MG capsule Take 1 capsule by mouth 2 (Two) Times a Day. 180 capsule 1     No current facility-administered medications for this visit.     Review of Systems   Constitutional: Negative.    Skin:         Painful toenails   Neurological:  Positive for numbness.   All other systems reviewed and are negative.      OBJECTIVE     Vitals:    06/10/25 1103   BP: 109/65   Pulse: 91   Temp: 96.9 °F (36.1 °C)   SpO2: 91%             Lab Results   Component Value Date    HGBA1C 6.70 (H) 05/12/2025       Lab Results   Component Value Date    GLUCOSE 137 (H) 05/12/2025    CALCIUM 10.1 05/12/2025     05/12/2025    K 5.0 05/12/2025    CO2 25.4 05/12/2025     05/12/2025    BUN 16 05/12/2025    CREATININE 1.10 05/12/2025    EGFRIFNONA 69 01/14/2022    BCR 14.5 05/12/2025    ANIONGAP 12.6 05/12/2025       Patient seen in no apparent distress.      PHYSICAL EXAM:     Foot/Ankle Exam    GENERAL  Appearance:  elderly  Orientation:  AAOx3  Affect:  appropriate  Gait:  antalgic  Assistance:  cane use  Right shoe gear: casual shoe  Left shoe gear: casual shoe    VASCULAR     Right Foot Vascularity   Dorsalis pedis:  1+  Posterior tibial:  1+  Skin temperature:  cool  Edema grading:  None  CFT:  < 3 seconds  Pedal hair growth:  Present  Varicosities:  mild varicosities     Left Foot Vascularity   Dorsalis pedis:  1+  Posterior tibial:  1+  Skin temperature:  cool  Edema grading:  None  CFT:   < 3 seconds  Pedal hair growth:  Present  Varicosities:  mild varicosities     NEUROLOGIC     Right Foot Neurologic   Light touch sensation: diminished  Vibratory sensation: diminished  Hot/Cold sensation: diminished  Protective Sensation using Metcalfe-Keiko Monofilament:   Sites intact: 3  Sites tested: 10     Left Foot Neurologic   Light touch sensation: diminished  Vibratory sensation: diminished  Hot/Cold sensation:  diminished  Protective Sensation using Metcalfe-Keiko Monofilament:   Sites intact: 3  Sites tested: 10    MUSCLE STRENGTH     Right Foot Muscle Strength   Foot dorsiflexion:  4-  Foot plantar flexion:  4-  Foot inversion:  4-  Foot eversion:  4-     Left Foot Muscle Strength   Foot dorsiflexion:  4-  Foot plantar flexion:  4-  Foot inversion:  4-  Foot eversion:  4-    RANGE OF MOTION     Right Foot Range of Motion   Foot and ankle ROM within normal limits       Left Foot Range of Motion   Foot and ankle ROM within normal limits      DERMATOLOGIC      Right Foot Dermatologic   Skin  Right foot skin is intact.   Nails  1.  Positive for elongated, onychomycosis, abnormal thickness, subungual debris and ingrown toenail.  2.  Positive for elongated, onychomycosis, abnormal thickness, subungual debris and ingrown toenail.  3.  Positive for elongated, onychomycosis, abnormal thickness, subungual debris and ingrown toenail.  4.  Positive for elongated, onychomycosis, abnormal thickness, subungual debris and ingrown toenail.  5.  Positive for elongated, onychomycosis, abnormal thickness, subungual debris and ingrown toenail.  Nails comment:  Toenails 1, 2, 3, 4, and 5     Left Foot Dermatologic   Skin  Left foot skin is intact.   Nails comment:  Toenails 1, 2, 3, 4, and 5  Nails  1.  Positive for elongated, onychomycosis, abnormal thickness, subungual debris and ingrown toenail.  2.  Positive for elongated, onychomycosis, abnormal thickness, subungual debris and ingrown toenail.  3.  Positive for  elongated, onychomycosis, abnormal thickness, subungual debris and ingrown toenail.  4.  Positive for elongated, onychomycosis, abnormally thick, subungual debris and ingrown toenail.  5.  Positive for elongated, onychomycosis, abnormally thick, subungual debris and ingrown toenail.    I have reexamined the patient the results are consistent with the previously documented exam.    ASSESSMENT/PLAN     Diagnoses and all orders for this visit:    1. Onychocryptosis (Primary)    2. Onychomycosis    3. Neuropathy    4. Type 2 diabetes mellitus with diabetic polyneuropathy, with long-term current use of insulin    5. Foot pain, bilateral    6. DM feet    Comprehensive lower extremity examination and evaluation was performed.    Discussed findings and treatment plan including risks, benefits, and treatment options with patient in detail. Patient agreed with treatment plan.    Toenails 1 through 5 bilaterally were debrided in thickness and length and then smoothed with a Dremel Tool.  Tolerated the procedure well without complications.    An After Visit Summary was printed and given to the patient at discharge, including (if requested) any available informative/educational handouts regarding diagnosis, treatment, or medications. All questions were answered to patient/family satisfaction. Should symptoms fail to improve or worsen they agree to call or return to clinic or to go to the Emergency Department. Discussed the importance of following up with any needed screening tests/labs/specialist appointments and any requested follow-up recommended by me today. Importance of maintaining follow-up discussed and patient accepts that missed appointments can delay diagnosis and potentially lead to worsening of conditions.    Return in about 9 weeks (around 8/12/2025) for Toenail Care., or sooner if acute issues arise.    I have reviewed the assessment and plan and verified the accuracy of it. No changes to assessment and plan since  the information was documented. Wayne Foster DPM 06/10/25     I have dictated this note utilizing Dragon Dictation.  Please note that portions of this note were completed with a voice recognition program.  Part of this note may be an electronic transcription/translation of spoken language to printed text using the Dragon Dictation System.      This document has been electronically signed by Wayne Foster DPM on Danae 10, 2025 11:26 EDT

## (undated) DEVICE — DISTRACT SCRW 14MM STRL

## (undated) DEVICE — SUT MNCRYL PLS ANTIB UD 4/0 PS2 18IN

## (undated) DEVICE — STERILE POLYISOPRENE POWDER-FREE SURGICAL GLOVES WITH EMOLLIENT COATING: Brand: PROTEXIS

## (undated) DEVICE — RETRACTION ASPIRATOR: Brand: FLOWTRIEVER

## (undated) DEVICE — SNAR E/S POLYP SNAREMASTER OVL/10MM 2.8X2300MM YEL

## (undated) DEVICE — Device: Brand: DEFENDO AIR/WATER/SUCTION AND BIOPSY VALVE

## (undated) DEVICE — COLON KIT: Brand: MEDLINE INDUSTRIES, INC.

## (undated) DEVICE — SINGLE-USE BIOPSY FORCEPS: Brand: RADIAL JAW 4

## (undated) DEVICE — SUT VIC 2/0 CT1 36IN

## (undated) DEVICE — GAMMEX® NON-LATEX SIZE 7.5, STERILE NEOPRENE POWDER-FREE SURGICAL GLOVE: Brand: GAMMEX

## (undated) DEVICE — STERILE POLYISOPRENE POWDER-FREE SURGICAL GLOVES: Brand: PROTEXIS

## (undated) DEVICE — GLV SURG BIOGEL LTX PF 7 1/2

## (undated) DEVICE — DRP MICROSCP LECIA W/CLEARLENS 137X381CM

## (undated) DEVICE — PENCL E/S HNDSWCH ROCKR CB

## (undated) DEVICE — SOL IRRG H2O PL/BG 1000ML STRL

## (undated) DEVICE — THE SINGLE USE ETRAP – POLYP TRAP IS USED FOR SUCTION RETRIEVAL OF ENDOSCOPICALLY REMOVED POLYPS.: Brand: ETRAP

## (undated) DEVICE — VAGINAL PREP TRAY: Brand: MEDLINE INDUSTRIES, INC.

## (undated) DEVICE — SLV SCD KN/LEN ADJ EXPRSS BLENDED MD 1P/U

## (undated) DEVICE — LAMINECTOMY CERVICAL DISC-LF: Brand: MEDLINE INDUSTRIES, INC.

## (undated) DEVICE — BANDAGE,GAUZE,BULKEE II,4.5"X4.1YD,STRL: Brand: MEDLINE